# Patient Record
Sex: MALE | Race: WHITE | Employment: OTHER | URBAN - METROPOLITAN AREA
[De-identification: names, ages, dates, MRNs, and addresses within clinical notes are randomized per-mention and may not be internally consistent; named-entity substitution may affect disease eponyms.]

---

## 2019-03-19 ENCOUNTER — HOSPITAL ENCOUNTER (INPATIENT)
Facility: CLINIC | Age: 34
LOS: 5 days | Discharge: HOME | DRG: 642 | End: 2019-03-25
Attending: FAMILY MEDICINE | Admitting: INTERNAL MEDICINE

## 2019-03-19 ENCOUNTER — APPOINTMENT (OUTPATIENT)
Dept: CT IMAGING | Facility: CLINIC | Age: 34
DRG: 642 | End: 2019-03-19
Attending: FAMILY MEDICINE

## 2019-03-19 DIAGNOSIS — E78.1 HYPERTRIGLYCERIDEMIA: ICD-10-CM

## 2019-03-19 DIAGNOSIS — K85.10 ACUTE BILIARY PANCREATITIS, UNSPECIFIED COMPLICATION STATUS: Primary | ICD-10-CM

## 2019-03-19 DIAGNOSIS — K85.90 ACUTE PANCREATITIS, UNSPECIFIED COMPLICATION STATUS, UNSPECIFIED PANCREATITIS TYPE: ICD-10-CM

## 2019-03-19 LAB
ALBUMIN SERPL-MCNC: 4 G/DL (ref 3.4–5)
ALBUMIN UR-MCNC: 30 MG/DL
ALP SERPL-CCNC: 76 U/L (ref 40–150)
ALT SERPL W P-5'-P-CCNC: 95 U/L (ref 0–70)
ANION GAP SERPL CALCULATED.3IONS-SCNC: 7 MMOL/L (ref 3–14)
APPEARANCE UR: CLEAR
AST SERPL W P-5'-P-CCNC: 73 U/L (ref 0–45)
BASOPHILS # BLD AUTO: 0 10E9/L (ref 0–0.2)
BASOPHILS NFR BLD AUTO: 0 %
BILIRUB SERPL-MCNC: 0.9 MG/DL (ref 0.2–1.3)
BILIRUB UR QL STRIP: NEGATIVE
BUN SERPL-MCNC: 14 MG/DL (ref 7–30)
CALCIUM SERPL-MCNC: 8.5 MG/DL (ref 8.5–10.1)
CHLORIDE SERPL-SCNC: 106 MMOL/L (ref 94–109)
CO2 SERPL-SCNC: 27 MMOL/L (ref 20–32)
COLOR UR AUTO: YELLOW
CREAT SERPL-MCNC: 0.78 MG/DL (ref 0.66–1.25)
DIFFERENTIAL METHOD BLD: ABNORMAL
EOSINOPHIL # BLD AUTO: 0.4 10E9/L (ref 0–0.7)
EOSINOPHIL NFR BLD AUTO: 3 %
ERYTHROCYTE [DISTWIDTH] IN BLOOD BY AUTOMATED COUNT: 12.8 % (ref 10–15)
GFR SERPL CREATININE-BSD FRML MDRD: >90 ML/MIN/{1.73_M2}
GLUCOSE SERPL-MCNC: 119 MG/DL (ref 70–99)
GLUCOSE UR STRIP-MCNC: NEGATIVE MG/DL
HCT VFR BLD AUTO: 44.5 % (ref 40–53)
HGB BLD-MCNC: ABNORMAL G/DL (ref 13.3–17.7)
HGB UR QL STRIP: NEGATIVE
KETONES UR STRIP-MCNC: NEGATIVE MG/DL
LEUKOCYTE ESTERASE UR QL STRIP: NEGATIVE
LIPASE SERPL-CCNC: 526 U/L (ref 73–393)
LYMPHOCYTES # BLD AUTO: 2.9 10E9/L (ref 0.8–5.3)
LYMPHOCYTES NFR BLD AUTO: 22 %
MCH RBC QN AUTO: ABNORMAL PG (ref 26.5–33)
MCHC RBC AUTO-ENTMCNC: ABNORMAL G/DL (ref 31.5–36.5)
MCV RBC AUTO: 85 FL (ref 78–100)
MONOCYTES # BLD AUTO: 1.3 10E9/L (ref 0–1.3)
MONOCYTES NFR BLD AUTO: 10 %
NEUTROPHILS # BLD AUTO: 8.6 10E9/L (ref 1.6–8.3)
NEUTROPHILS NFR BLD AUTO: 65 %
NITRATE UR QL: NEGATIVE
PH UR STRIP: 6 PH (ref 5–7)
PLATELET # BLD AUTO: 353 10E9/L (ref 150–450)
POTASSIUM SERPL-SCNC: 4.1 MMOL/L (ref 3.4–5.3)
PROT SERPL-MCNC: 7.7 G/DL (ref 6.8–8.8)
RBC # BLD AUTO: 5.25 10E12/L (ref 4.4–5.9)
RBC #/AREA URNS AUTO: <1 /HPF (ref 0–2)
SODIUM SERPL-SCNC: 140 MMOL/L (ref 133–144)
SOURCE: ABNORMAL
SP GR UR STRIP: 1.02 (ref 1–1.03)
UROBILINOGEN UR STRIP-MCNC: 0 MG/DL (ref 0–2)
WBC # BLD AUTO: 13.3 10E9/L (ref 4–11)
WBC #/AREA URNS AUTO: <1 /HPF (ref 0–5)

## 2019-03-19 PROCEDURE — 99285 EMERGENCY DEPT VISIT HI MDM: CPT | Mod: 25 | Performed by: FAMILY MEDICINE

## 2019-03-19 PROCEDURE — 80053 COMPREHEN METABOLIC PANEL: CPT | Performed by: FAMILY MEDICINE

## 2019-03-19 PROCEDURE — 99285 EMERGENCY DEPT VISIT HI MDM: CPT | Mod: 25

## 2019-03-19 PROCEDURE — 25800030 ZZH RX IP 258 OP 636: Performed by: FAMILY MEDICINE

## 2019-03-19 PROCEDURE — 96375 TX/PRO/DX INJ NEW DRUG ADDON: CPT

## 2019-03-19 PROCEDURE — 85025 COMPLETE CBC W/AUTO DIFF WBC: CPT | Performed by: FAMILY MEDICINE

## 2019-03-19 PROCEDURE — 81001 URINALYSIS AUTO W/SCOPE: CPT | Performed by: EMERGENCY MEDICINE

## 2019-03-19 PROCEDURE — 96374 THER/PROPH/DIAG INJ IV PUSH: CPT

## 2019-03-19 PROCEDURE — 74176 CT ABD & PELVIS W/O CONTRAST: CPT

## 2019-03-19 PROCEDURE — 83690 ASSAY OF LIPASE: CPT | Performed by: FAMILY MEDICINE

## 2019-03-19 PROCEDURE — 25000128 H RX IP 250 OP 636: Performed by: FAMILY MEDICINE

## 2019-03-19 RX ORDER — ONDANSETRON 2 MG/ML
4 INJECTION INTRAMUSCULAR; INTRAVENOUS ONCE
Status: COMPLETED | OUTPATIENT
Start: 2019-03-19 | End: 2019-03-19

## 2019-03-19 RX ORDER — HYDROMORPHONE HYDROCHLORIDE 1 MG/ML
0.5 INJECTION, SOLUTION INTRAMUSCULAR; INTRAVENOUS; SUBCUTANEOUS ONCE
Status: COMPLETED | OUTPATIENT
Start: 2019-03-19 | End: 2019-03-19

## 2019-03-19 RX ORDER — KETOROLAC TROMETHAMINE 30 MG/ML
30 INJECTION, SOLUTION INTRAMUSCULAR; INTRAVENOUS ONCE
Status: COMPLETED | OUTPATIENT
Start: 2019-03-19 | End: 2019-03-19

## 2019-03-19 RX ADMIN — HYDROMORPHONE HYDROCHLORIDE 0.5 MG: 1 INJECTION, SOLUTION INTRAMUSCULAR; INTRAVENOUS; SUBCUTANEOUS at 23:48

## 2019-03-19 RX ADMIN — SODIUM CHLORIDE, POTASSIUM CHLORIDE, SODIUM LACTATE AND CALCIUM CHLORIDE 1000 ML: 600; 310; 30; 20 INJECTION, SOLUTION INTRAVENOUS at 22:35

## 2019-03-19 RX ADMIN — KETOROLAC TROMETHAMINE 30 MG: 30 INJECTION, SOLUTION INTRAMUSCULAR at 22:38

## 2019-03-19 RX ADMIN — ONDANSETRON 4 MG: 2 INJECTION INTRAMUSCULAR; INTRAVENOUS at 22:36

## 2019-03-19 ASSESSMENT — MIFFLIN-ST. JEOR: SCORE: 2074.38

## 2019-03-20 PROBLEM — E78.1 HYPERTRIGLYCERIDEMIA: Status: ACTIVE | Noted: 2019-03-20

## 2019-03-20 PROBLEM — K21.9 CHRONIC GERD: Status: ACTIVE | Noted: 2019-03-20

## 2019-03-20 PROBLEM — K85.90 ACUTE PANCREATITIS: Status: ACTIVE | Noted: 2019-03-20

## 2019-03-20 LAB
ALBUMIN SERPL-MCNC: 3.4 G/DL (ref 3.4–5)
ALBUMIN UR-MCNC: NEGATIVE MG/DL
ALP SERPL-CCNC: 65 U/L (ref 40–150)
ALT SERPL W P-5'-P-CCNC: 79 U/L (ref 0–70)
ANION GAP SERPL CALCULATED.3IONS-SCNC: 11 MMOL/L (ref 3–14)
APPEARANCE UR: CLEAR
AST SERPL W P-5'-P-CCNC: 42 U/L (ref 0–45)
BILIRUB SERPL-MCNC: 1 MG/DL (ref 0.2–1.3)
BILIRUB UR QL STRIP: NEGATIVE
BUN SERPL-MCNC: 14 MG/DL (ref 7–30)
CALCIUM SERPL-MCNC: 8.1 MG/DL (ref 8.5–10.1)
CHLORIDE SERPL-SCNC: 104 MMOL/L (ref 94–109)
CHOLEST SERPL-MCNC: 274 MG/DL
CO2 SERPL-SCNC: 25 MMOL/L (ref 20–32)
COLOR UR AUTO: YELLOW
CREAT SERPL-MCNC: 0.8 MG/DL (ref 0.66–1.25)
ERYTHROCYTE [DISTWIDTH] IN BLOOD BY AUTOMATED COUNT: 13 % (ref 10–15)
ETHANOL SERPL-MCNC: <0.01 G/DL
GFR SERPL CREATININE-BSD FRML MDRD: >90 ML/MIN/{1.73_M2}
GLUCOSE BLDC GLUCOMTR-MCNC: 103 MG/DL (ref 70–99)
GLUCOSE BLDC GLUCOMTR-MCNC: 107 MG/DL (ref 70–99)
GLUCOSE BLDC GLUCOMTR-MCNC: 112 MG/DL (ref 70–99)
GLUCOSE BLDC GLUCOMTR-MCNC: 118 MG/DL (ref 70–99)
GLUCOSE BLDC GLUCOMTR-MCNC: 120 MG/DL (ref 70–99)
GLUCOSE BLDC GLUCOMTR-MCNC: 122 MG/DL (ref 70–99)
GLUCOSE BLDC GLUCOMTR-MCNC: 123 MG/DL (ref 70–99)
GLUCOSE BLDC GLUCOMTR-MCNC: 128 MG/DL (ref 70–99)
GLUCOSE BLDC GLUCOMTR-MCNC: 128 MG/DL (ref 70–99)
GLUCOSE BLDC GLUCOMTR-MCNC: 131 MG/DL (ref 70–99)
GLUCOSE BLDC GLUCOMTR-MCNC: 139 MG/DL (ref 70–99)
GLUCOSE SERPL-MCNC: 141 MG/DL (ref 70–99)
GLUCOSE UR STRIP-MCNC: NEGATIVE MG/DL
HBA1C MFR BLD: 5.7 % (ref 0–5.6)
HCT VFR BLD AUTO: 40.4 % (ref 40–53)
HDLC SERPL-MCNC: 17 MG/DL
HGB BLD-MCNC: ABNORMAL G/DL (ref 13.3–17.7)
HGB UR QL STRIP: NEGATIVE
KETONES UR STRIP-MCNC: NEGATIVE MG/DL
LACTATE BLD-SCNC: 1.2 MMOL/L (ref 0.7–2)
LDLC SERPL CALC-MCNC: ABNORMAL MG/DL
LEUKOCYTE ESTERASE UR QL STRIP: NEGATIVE
LIPASE SERPL-CCNC: 345 U/L (ref 73–393)
MCH RBC QN AUTO: ABNORMAL PG (ref 26.5–33)
MCHC RBC AUTO-ENTMCNC: ABNORMAL G/DL (ref 31.5–36.5)
MCV RBC AUTO: 86 FL (ref 78–100)
MRSA DNA SPEC QL NAA+PROBE: NEGATIVE
NITRATE UR QL: NEGATIVE
NONHDLC SERPL-MCNC: 257 MG/DL
PH UR STRIP: 7 PH (ref 5–7)
PLATELET # BLD AUTO: 261 10E9/L (ref 150–450)
POTASSIUM SERPL-SCNC: 3.9 MMOL/L (ref 3.4–5.3)
PROT SERPL-MCNC: 6.5 G/DL (ref 6.8–8.8)
RBC # BLD AUTO: 4.72 10E12/L (ref 4.4–5.9)
RBC #/AREA URNS AUTO: <1 /HPF (ref 0–2)
SODIUM SERPL-SCNC: 140 MMOL/L (ref 133–144)
SOURCE: NORMAL
SP GR UR STRIP: 1.01 (ref 1–1.03)
SPECIMEN SOURCE: NORMAL
TRIGL SERPL-MCNC: 2305 MG/DL
TRIGL SERPL-MCNC: 4463 MG/DL
TRIGL SERPL-MCNC: >4000 MG/DL
TSH SERPL DL<=0.005 MIU/L-ACNC: 2.08 MU/L (ref 0.4–4)
UROBILINOGEN UR STRIP-MCNC: 0 MG/DL (ref 0–2)
WBC # BLD AUTO: 11.9 10E9/L (ref 4–11)
WBC #/AREA URNS AUTO: <1 /HPF (ref 0–5)

## 2019-03-20 PROCEDURE — 85027 COMPLETE CBC AUTOMATED: CPT | Performed by: PHYSICIAN ASSISTANT

## 2019-03-20 PROCEDURE — 25800030 ZZH RX IP 258 OP 636: Performed by: PHYSICIAN ASSISTANT

## 2019-03-20 PROCEDURE — 25000132 ZZH RX MED GY IP 250 OP 250 PS 637: Performed by: PHYSICIAN ASSISTANT

## 2019-03-20 PROCEDURE — 25000128 H RX IP 250 OP 636: Performed by: FAMILY MEDICINE

## 2019-03-20 PROCEDURE — 25000128 H RX IP 250 OP 636: Performed by: PHYSICIAN ASSISTANT

## 2019-03-20 PROCEDURE — C9113 INJ PANTOPRAZOLE SODIUM, VIA: HCPCS | Performed by: FAMILY MEDICINE

## 2019-03-20 PROCEDURE — 80053 COMPREHEN METABOLIC PANEL: CPT | Performed by: PHYSICIAN ASSISTANT

## 2019-03-20 PROCEDURE — 87641 MR-STAPH DNA AMP PROBE: CPT | Performed by: PHYSICIAN ASSISTANT

## 2019-03-20 PROCEDURE — 99223 1ST HOSP IP/OBS HIGH 75: CPT | Mod: AI | Performed by: INTERNAL MEDICINE

## 2019-03-20 PROCEDURE — 25000131 ZZH RX MED GY IP 250 OP 636 PS 637: Performed by: PHYSICIAN ASSISTANT

## 2019-03-20 PROCEDURE — 80320 DRUG SCREEN QUANTALCOHOLS: CPT | Performed by: STUDENT IN AN ORGANIZED HEALTH CARE EDUCATION/TRAINING PROGRAM

## 2019-03-20 PROCEDURE — 80061 LIPID PANEL: CPT | Performed by: INTERNAL MEDICINE

## 2019-03-20 PROCEDURE — 36415 COLL VENOUS BLD VENIPUNCTURE: CPT | Performed by: PHYSICIAN ASSISTANT

## 2019-03-20 PROCEDURE — 83036 HEMOGLOBIN GLYCOSYLATED A1C: CPT | Performed by: INTERNAL MEDICINE

## 2019-03-20 PROCEDURE — 83690 ASSAY OF LIPASE: CPT | Performed by: PHYSICIAN ASSISTANT

## 2019-03-20 PROCEDURE — 83605 ASSAY OF LACTIC ACID: CPT | Performed by: INTERNAL MEDICINE

## 2019-03-20 PROCEDURE — C9113 INJ PANTOPRAZOLE SODIUM, VIA: HCPCS | Performed by: PHYSICIAN ASSISTANT

## 2019-03-20 PROCEDURE — 20000003 ZZH R&B ICU

## 2019-03-20 PROCEDURE — 99207 ZZC APP CREDIT; MD BILLING SHARED VISIT: CPT | Performed by: PHYSICIAN ASSISTANT

## 2019-03-20 PROCEDURE — 84478 ASSAY OF TRIGLYCERIDES: CPT | Performed by: PHYSICIAN ASSISTANT

## 2019-03-20 PROCEDURE — 25000128 H RX IP 250 OP 636: Performed by: INTERNAL MEDICINE

## 2019-03-20 PROCEDURE — 00000146 ZZHCL STATISTIC GLUCOSE BY METER IP

## 2019-03-20 PROCEDURE — 36415 COLL VENOUS BLD VENIPUNCTURE: CPT | Performed by: INTERNAL MEDICINE

## 2019-03-20 PROCEDURE — 87640 STAPH A DNA AMP PROBE: CPT | Performed by: PHYSICIAN ASSISTANT

## 2019-03-20 PROCEDURE — 81001 URINALYSIS AUTO W/SCOPE: CPT | Performed by: PHYSICIAN ASSISTANT

## 2019-03-20 PROCEDURE — 96375 TX/PRO/DX INJ NEW DRUG ADDON: CPT

## 2019-03-20 PROCEDURE — 84443 ASSAY THYROID STIM HORMONE: CPT | Performed by: PHYSICIAN ASSISTANT

## 2019-03-20 RX ORDER — NALOXONE HYDROCHLORIDE 0.4 MG/ML
.1-.4 INJECTION, SOLUTION INTRAMUSCULAR; INTRAVENOUS; SUBCUTANEOUS
Status: DISCONTINUED | OUTPATIENT
Start: 2019-03-20 | End: 2019-03-20

## 2019-03-20 RX ORDER — PROCHLORPERAZINE 25 MG
25 SUPPOSITORY, RECTAL RECTAL EVERY 12 HOURS PRN
Status: DISCONTINUED | OUTPATIENT
Start: 2019-03-20 | End: 2019-03-25 | Stop reason: HOSPADM

## 2019-03-20 RX ORDER — IBUPROFEN 200 MG
400-600 TABLET ORAL DAILY PRN
Status: ON HOLD | COMMUNITY
End: 2019-03-25

## 2019-03-20 RX ORDER — SODIUM CHLORIDE, SODIUM LACTATE, POTASSIUM CHLORIDE, CALCIUM CHLORIDE 600; 310; 30; 20 MG/100ML; MG/100ML; MG/100ML; MG/100ML
INJECTION, SOLUTION INTRAVENOUS CONTINUOUS
Status: DISCONTINUED | OUTPATIENT
Start: 2019-03-20 | End: 2019-03-20

## 2019-03-20 RX ORDER — ONDANSETRON 4 MG/1
4 TABLET, ORALLY DISINTEGRATING ORAL EVERY 6 HOURS PRN
Status: DISCONTINUED | OUTPATIENT
Start: 2019-03-20 | End: 2019-03-25 | Stop reason: HOSPADM

## 2019-03-20 RX ORDER — DEXTROSE MONOHYDRATE 25 G/50ML
25-50 INJECTION, SOLUTION INTRAVENOUS
Status: DISCONTINUED | OUTPATIENT
Start: 2019-03-20 | End: 2019-03-24

## 2019-03-20 RX ORDER — ONDANSETRON 2 MG/ML
4 INJECTION INTRAMUSCULAR; INTRAVENOUS EVERY 6 HOURS PRN
Status: DISCONTINUED | OUTPATIENT
Start: 2019-03-20 | End: 2019-03-25 | Stop reason: HOSPADM

## 2019-03-20 RX ORDER — NALOXONE HYDROCHLORIDE 0.4 MG/ML
.1-.4 INJECTION, SOLUTION INTRAMUSCULAR; INTRAVENOUS; SUBCUTANEOUS
Status: DISCONTINUED | OUTPATIENT
Start: 2019-03-20 | End: 2019-03-25 | Stop reason: HOSPADM

## 2019-03-20 RX ORDER — OXYCODONE HYDROCHLORIDE 5 MG/1
5-10 TABLET ORAL
Status: DISCONTINUED | OUTPATIENT
Start: 2019-03-20 | End: 2019-03-23

## 2019-03-20 RX ORDER — HYDROMORPHONE HYDROCHLORIDE 1 MG/ML
.5-1 INJECTION, SOLUTION INTRAMUSCULAR; INTRAVENOUS; SUBCUTANEOUS
Status: DISCONTINUED | OUTPATIENT
Start: 2019-03-20 | End: 2019-03-25 | Stop reason: HOSPADM

## 2019-03-20 RX ORDER — NICOTINE POLACRILEX 4 MG
15-30 LOZENGE BUCCAL
Status: DISCONTINUED | OUTPATIENT
Start: 2019-03-20 | End: 2019-03-24

## 2019-03-20 RX ORDER — SODIUM CHLORIDE 9 MG/ML
INJECTION, SOLUTION INTRAVENOUS CONTINUOUS
Status: DISCONTINUED | OUTPATIENT
Start: 2019-03-20 | End: 2019-03-20

## 2019-03-20 RX ORDER — ACETAMINOPHEN 325 MG/1
650 TABLET ORAL EVERY 4 HOURS PRN
Status: DISCONTINUED | OUTPATIENT
Start: 2019-03-20 | End: 2019-03-25 | Stop reason: HOSPADM

## 2019-03-20 RX ORDER — KETOROLAC TROMETHAMINE 15 MG/ML
15 INJECTION, SOLUTION INTRAMUSCULAR; INTRAVENOUS EVERY 6 HOURS PRN
Status: DISCONTINUED | OUTPATIENT
Start: 2019-03-20 | End: 2019-03-20

## 2019-03-20 RX ORDER — DEXTROSE, SODIUM CHLORIDE, SODIUM LACTATE, POTASSIUM CHLORIDE, AND CALCIUM CHLORIDE 5; .6; .31; .03; .02 G/100ML; G/100ML; G/100ML; G/100ML; G/100ML
INJECTION, SOLUTION INTRAVENOUS CONTINUOUS
Status: DISCONTINUED | OUTPATIENT
Start: 2019-03-20 | End: 2019-03-24

## 2019-03-20 RX ORDER — HYDROMORPHONE HYDROCHLORIDE 1 MG/ML
.3-.5 INJECTION, SOLUTION INTRAMUSCULAR; INTRAVENOUS; SUBCUTANEOUS
Status: DISCONTINUED | OUTPATIENT
Start: 2019-03-20 | End: 2019-03-20

## 2019-03-20 RX ORDER — PROCHLORPERAZINE MALEATE 10 MG
10 TABLET ORAL EVERY 6 HOURS PRN
Status: DISCONTINUED | OUTPATIENT
Start: 2019-03-20 | End: 2019-03-25 | Stop reason: HOSPADM

## 2019-03-20 RX ADMIN — HYDROMORPHONE HYDROCHLORIDE 0.5 MG: 1 INJECTION, SOLUTION INTRAMUSCULAR; INTRAVENOUS; SUBCUTANEOUS at 18:50

## 2019-03-20 RX ADMIN — Medication 0.5 MG: at 01:25

## 2019-03-20 RX ADMIN — HYDROMORPHONE HYDROCHLORIDE 0.5 MG: 1 INJECTION, SOLUTION INTRAMUSCULAR; INTRAVENOUS; SUBCUTANEOUS at 20:42

## 2019-03-20 RX ADMIN — HYDROMORPHONE HYDROCHLORIDE 0.5 MG: 1 INJECTION, SOLUTION INTRAMUSCULAR; INTRAVENOUS; SUBCUTANEOUS at 10:39

## 2019-03-20 RX ADMIN — Medication 0.5 MG: at 05:36

## 2019-03-20 RX ADMIN — SODIUM CHLORIDE, SODIUM LACTATE, POTASSIUM CHLORIDE, CALCIUM CHLORIDE AND DEXTROSE MONOHYDRATE: 5; 600; 310; 30; 20 INJECTION, SOLUTION INTRAVENOUS at 21:37

## 2019-03-20 RX ADMIN — SODIUM CHLORIDE, SODIUM LACTATE, POTASSIUM CHLORIDE, CALCIUM CHLORIDE AND DEXTROSE MONOHYDRATE: 5; 600; 310; 30; 20 INJECTION, SOLUTION INTRAVENOUS at 17:01

## 2019-03-20 RX ADMIN — ACETAMINOPHEN 650 MG: 325 TABLET, FILM COATED ORAL at 15:50

## 2019-03-20 RX ADMIN — Medication 0.5 MG: at 07:07

## 2019-03-20 RX ADMIN — PANTOPRAZOLE SODIUM 40 MG: 40 INJECTION, POWDER, FOR SOLUTION INTRAVENOUS at 00:09

## 2019-03-20 RX ADMIN — ACETAMINOPHEN 650 MG: 325 TABLET, FILM COATED ORAL at 21:44

## 2019-03-20 RX ADMIN — SODIUM CHLORIDE, SODIUM LACTATE, POTASSIUM CHLORIDE, CALCIUM CHLORIDE AND DEXTROSE MONOHYDRATE: 5; 600; 310; 30; 20 INJECTION, SOLUTION INTRAVENOUS at 12:43

## 2019-03-20 RX ADMIN — Medication 0.5 MG: at 03:15

## 2019-03-20 RX ADMIN — SODIUM CHLORIDE 2 UNITS/HR: 9 INJECTION, SOLUTION INTRAVENOUS at 12:44

## 2019-03-20 RX ADMIN — HYDROMORPHONE HYDROCHLORIDE 0.5 MG: 1 INJECTION, SOLUTION INTRAMUSCULAR; INTRAVENOUS; SUBCUTANEOUS at 15:51

## 2019-03-20 RX ADMIN — HYDROMORPHONE HYDROCHLORIDE 0.5 MG: 1 INJECTION, SOLUTION INTRAMUSCULAR; INTRAVENOUS; SUBCUTANEOUS at 18:01

## 2019-03-20 RX ADMIN — ONDANSETRON 4 MG: 2 INJECTION INTRAMUSCULAR; INTRAVENOUS at 08:48

## 2019-03-20 RX ADMIN — SODIUM CHLORIDE, POTASSIUM CHLORIDE, SODIUM LACTATE AND CALCIUM CHLORIDE: 600; 310; 30; 20 INJECTION, SOLUTION INTRAVENOUS at 07:02

## 2019-03-20 RX ADMIN — KETOROLAC TROMETHAMINE 15 MG: 15 INJECTION, SOLUTION INTRAMUSCULAR; INTRAVENOUS at 13:13

## 2019-03-20 RX ADMIN — HYDROMORPHONE HYDROCHLORIDE 0.5 MG: 1 INJECTION, SOLUTION INTRAMUSCULAR; INTRAVENOUS; SUBCUTANEOUS at 21:42

## 2019-03-20 RX ADMIN — PANTOPRAZOLE SODIUM 40 MG: 40 INJECTION, POWDER, FOR SOLUTION INTRAVENOUS at 10:39

## 2019-03-20 RX ADMIN — Medication 1 MG: at 21:44

## 2019-03-20 RX ADMIN — ONDANSETRON 4 MG: 2 INJECTION INTRAMUSCULAR; INTRAVENOUS at 17:09

## 2019-03-20 RX ADMIN — SODIUM CHLORIDE, POTASSIUM CHLORIDE, SODIUM LACTATE AND CALCIUM CHLORIDE: 600; 310; 30; 20 INJECTION, SOLUTION INTRAVENOUS at 03:12

## 2019-03-20 RX ADMIN — Medication 1 MG: at 01:20

## 2019-03-20 RX ADMIN — PROCHLORPERAZINE EDISYLATE 10 MG: 5 INJECTION INTRAMUSCULAR; INTRAVENOUS at 10:46

## 2019-03-20 RX ADMIN — HYDROMORPHONE HYDROCHLORIDE 1 MG: 1 INJECTION, SOLUTION INTRAMUSCULAR; INTRAVENOUS; SUBCUTANEOUS at 08:37

## 2019-03-20 RX ADMIN — ONDANSETRON 4 MG: 2 INJECTION INTRAMUSCULAR; INTRAVENOUS at 21:46

## 2019-03-20 RX ADMIN — PANTOPRAZOLE SODIUM 40 MG: 40 INJECTION, POWDER, FOR SOLUTION INTRAVENOUS at 21:50

## 2019-03-20 SDOH — HEALTH STABILITY: MENTAL HEALTH: HOW OFTEN DO YOU HAVE A DRINK CONTAINING ALCOHOL?: 2-4 TIMES A MONTH

## 2019-03-20 ASSESSMENT — ACTIVITIES OF DAILY LIVING (ADL)
BATHING: 0-->INDEPENDENT
FALL_HISTORY_WITHIN_LAST_SIX_MONTHS: NO
TRANSFERRING: 0-->INDEPENDENT
ADLS_ACUITY_SCORE: 10
DRESS: 0-->INDEPENDENT
RETIRED_EATING: 0-->INDEPENDENT
ADLS_ACUITY_SCORE: 10
COGNITION: 0 - NO COGNITION ISSUES REPORTED
RETIRED_COMMUNICATION: 0-->UNDERSTANDS/COMMUNICATES WITHOUT DIFFICULTY
TOILETING: 0-->INDEPENDENT
AMBULATION: 0-->INDEPENDENT
ADLS_ACUITY_SCORE: 10
SWALLOWING: 0-->SWALLOWS FOODS/LIQUIDS WITHOUT DIFFICULTY

## 2019-03-20 ASSESSMENT — MIFFLIN-ST. JEOR: SCORE: 2103.25

## 2019-03-20 ASSESSMENT — PAIN DESCRIPTION - DESCRIPTORS: DESCRIPTORS: ACHING;CRAMPING;DISCOMFORT;HEADACHE

## 2019-03-20 NOTE — PLAN OF CARE
"[place] [facility] [place] ADMISSION NOTE    Patient admitted to room 2309 at approximately 0045 via cart from emergency room. Patient was accompanied by significant other.     Verbal SBAR report received from Ludwig prior to patient arrival.     Patient ambulated to bed with stand-by assist. Patient alert and oriented X 3. Pain is controlled with current analgesics.  Medication(s) being used: narcotic analgesics including dilaudid. 0-10 Pain Scale: 7. Admission vital signs: Blood pressure 151/88, pulse 95, temperature 99.2  F (37.3  C), temperature source Oral, resp. rate 18, height 1.753 m (5' 9\"), weight 113.4 kg (250 lb 0 oz), SpO2 96 %. Patient was oriented to plan of care, call light, bed controls, tv, telephone, bathroom and visiting hours.     Risk Assessment    The following safety risks were identified during admission: none. Yellow risk band applied: NO.     Skin Initial Assessment    This writer admitted this patient and completed a full skin assessment and Ric score in the Adult PCS flowsheet. Appropriate interventions initiated as needed. Skin intact except for facial rash.    Secondary skin check completed by Luz Maria REID RN.         Lydumila Freed      "

## 2019-03-20 NOTE — PLAN OF CARE
Patient transferred to ICU for insulin drip. Currently at 2 units/hr. Patient placed on oxygen for a time D/T hypoventilation after dose of dilaudid. Patient trial of toradol  with good relief of pain 1 hr post. Patient appears to have some sleep apnea.

## 2019-03-20 NOTE — H&P
[facility]    History and Physical - Hospitalist Service       Date of Admission:  3/19/2019    Assessment & Plan   Booker Quintana is a 33 year old male admitted on 3/19/2019. He presents with abdominal pain.    Abd/pelvis CT - no contrast - Stone Protocol    Impression    IMPRESSION:  1. Inflammation surrounding the head of the pancreas likely related to  pancreatitis. Its possible duodenitis or duodenal ulcer could  contribute to this appearance but most likely related to pancreatitis.  2. Prominent fatty infiltration of the liver.  3. Mildly prominent spleen.    PORFIRIO SAMUEL MD       Acute pancreatitis due to hypertriglyceridemia  Acute onset low back pain the morning of admission, throughout day moved to mid-abdomen and epigastrium. Pain is constant and sharp. Denies fever, chills, nausea, vomiting, diarrhea, constipation. He did develop some nausea after receiving Dilaudid this morning. Occasional ETOH use (3-5 drinks/month). Abdomen is markedly tender with guarding in RLQ, mid-abdomen, and epigastrium. CT shows likely pancreatitis with possible duodenitis/duodenal ulcer, fatty liver, prominent spleen. Triglycerides 4463, WBC 13.3 -> 11.9, Lipase 526 -> 345, ALT 95 -> 79, AST 73 -> 42, EtOH 0.   Received 1L LR in the ED and another 2L @ 250/hr.  - NPO  - Dilaudid 0.5-1mg q2h prn  - Transfer to ICU  - Insulin drip @ 2 unit(s)/hr  - D5LR @ 250 / hr  - Hourly glucose checks  - Triglycerides q12h    Hypertriglyceridemia  Unclear etiology.  He does have some diabetes and elevated cholesterol his family but no known specific diagnosis of hypertriglyceridemia.  No xanthomas noted on exam.  Denies excessive alcohol use.  Not on any medications known to cause hypertriglyceridemia.  A1c 5.7%.  - TSH  - UA  - Lifestyle coaching and fenofibrate on discharge    Chronic GERD  Daily heartburn for 10-15 years. Takes daily omeprazole and ranitidine along with frequent Tums. Worse at night,  frequently wakes up vomiting. CT did show some findings concerning for duodenitis or possible duodenal ulcer.  If epigastric pain is not improving as expected with resolution of the pancreatitis, may need to consider an EGD during this admission, otherwise should have an outpatient EGD.  - IV pantoprazole 40 mg BID  - Outpatient EGD       Diet: NPO for Medical/Clinical Reasons Except for: Meds    DVT Prophylaxis: Low Risk/Ambulatory with no VTE prophylaxis indicated  Bishop Catheter: not present  Code Status: Full    Disposition Plan   Expected discharge: 4 - 7 days, recommended to prior living arrangement once pancreatitis and hypertriglyceridemia resolved.  Entered: Elmo Carrizales PA-C 03/20/2019, 11:00 AM     The patient's care was discussed with the Attending Physician, Dr. Nixon, Patient and Patient's Family.    Elmo Carrizales PA-C  [facility]    ______________________________________________________________________    Chief Complaint   Abdominal pain    History is obtained from the patient    History of Present Illness   Booker Quintana is a 33 year old male with no significant medical history other than GERD who presents with acute onset of abdominal pain.  Woke up with mild low back pain yesterday morning, initially thought it was pain from moving couch as the day before.  After lunch the pain migrated around to his mid abdomen and from there extended to the right lower quadrant and epigastrium.  Pain continued to worsen throughout the day.  Denies fever, chills, nausea, vomiting, black/bloody stools.  Denies any similar previous abdominal pain.  Denies history of pancreatitis.  Acknowledges occasional alcohol use but states it is no more than 3-5 drinks per month.  This is confirmed by his family.  He did have two double rum and Cokes the day before admission.    He also acknowledges a long-standing history of severe heartburn for which he takes daily OTC omeprazole,  ranitidine, and Tums.  He has never been worked up for this.    Denies chest pain, palpitations, dyspnea, cough, fevers, chills.    Review of Systems    The 10 point Review of Systems is negative other than noted in the HPI or here.     Past Medical History    I have reviewed this patient's medical history and updated it with pertinent information if needed.   No past medical history on file.    Past Surgical History   I have reviewed this patient's surgical history and updated it with pertinent information if needed.  No past surgical history on file.    Social History   I have reviewed this patient's social history and updated it with pertinent information if needed.  Social History     Tobacco Use     Smoking status: Current Some Day Smoker     Packs/day: 0.10     Types: Cigarettes   Substance Use Topics     Alcohol use: Yes     Frequency: 2-4 times a month     Drug use: Not on file       Family History   I have reviewed this patient's family history and updated it with pertinent information if needed.   Family History   Problem Relation Age of Onset     Diabetes Maternal Great-Grandmother      Diabetes Maternal Uncle      Diabetes Paternal Grandfather      Hyperlipidemia Paternal Grandfather        Prior to Admission Medications   Prior to Admission Medications   Prescriptions Last Dose Informant Patient Reported? Taking?   ibuprofen (ADVIL/MOTRIN) 200 MG tablet 3/19/2019 at 08  Yes Yes   Sig: Take 400-600 mg by mouth daily as needed for mild pain   omeprazole (PRILOSEC) 20 MG DR capsule 3/19/2019 at 08 Self Yes Yes   Sig: Take 20 mg by mouth daily   ranitidine (ZANTAC) 75 MG tablet 3/19/2019 at 08  Yes Yes   Sig: Take 75 mg by mouth daily      Facility-Administered Medications: None     Allergies   No Known Allergies    Physical Exam   Vital Signs: Temp: 98.7  F (37.1  C) Temp src: Oral BP: 136/76 Pulse: 92 Heart Rate: 94 Resp: 18 SpO2: 93 % O2 Device: None (Room air)    Weight: 250 lbs .03 oz    General:  Appears calm, comfortable, nontoxic. Answers questions quickly and appropriately with clear speech. No apparent distress.  Skin: Pink, warm, dry. No xanthomas.  Eyes: PERRL. Sclera are white.  HENT:  Normocephalic, atraumatic. Oropharynx is moist, without lesions or exudate.  Lymph/Hematologic: No occipital, anterior/posterior cervical, supraclavicular, or axillary lymphadenopathy.  CV: RRR, clear S1/S2 without murmur, rub, or gallop. Radial pulses equal bilaterally. No lower extremity edema.  Respiratory: CTA and equal bilaterally. Normal respiratory effort.  GI: Firm, diffusely tender with focal tenderness and guarding at the epigastrium, mid-abdomen, and RLQ. Normal bowel sounds.  Musculoskeletal: Moving all extremities well. Normal muscle bulk and tone.  Neuro: Memory and cognition appear normal. CN II - XII grossly intact. Symmetrical extremity strength.  Psych: Normal mood and affect.         Data   Data reviewed today: I reviewed all medications, new labs and imaging results over the last 24 hours. I personally reviewed no images or EKG's today.    Recent Labs   Lab 03/20/19  0506 03/19/19  2148   WBC 11.9* 13.3*   HGB Results not available-specimen lipemic Results not available-specimen lipemic   MCV 86 85    353    140   POTASSIUM 3.9 4.1   CHLORIDE 104 106   CO2 25 27   BUN 14 14   CR 0.80 0.78   ANIONGAP 11 7   BRIAN 8.1* 8.5   * 119*   ALBUMIN 3.4 4.0   PROTTOTAL 6.5* 7.7   BILITOTAL 1.0 0.9   ALKPHOS 65 76   ALT 79* 95*   AST 42 73*   LIPASE 345 526*     Recent Results (from the past 24 hour(s))   Abd/pelvis CT - no contrast - Stone Protocol    Narrative    CT ABDOMEN AND PELVIS WITHOUT CONTRAST   3/19/2019 10:46 PM     HISTORY: Abdominal pain, appendicitis suspected. Right lower quadrant  abdominal pain.    TECHNIQUE:   No IV contrast material. Radiation dose for this scan was  reduced using automated exposure control, adjustment of the mA and/or  kV according to patient size, or  iterative reconstruction technique.    COMPARISON: None.    FINDINGS:    Lung bases are unremarkable. Prominent fatty infiltration of the  liver. Spleen is upper-normal to mildly prominent. There is  inflammation in the head of the pancreas consistent with pancreatitis.  This is immediately adjacent to the duodenum and duodenitis/duodenal  ulcer or acid peptic disease could contribute to this appearance.    Kidneys and adrenals are normal. No adenopathy. Appendix is normal and  well seen. No evidence of bowel obstruction, free air, or free fluid.      Impression    IMPRESSION:  1. Inflammation surrounding the head of the pancreas likely related to  pancreatitis. Its possible duodenitis or duodenal ulcer could  contribute to this appearance but most likely related to pancreatitis.  2. Prominent fatty infiltration of the liver.  3. Mildly prominent spleen.    PORFIRIO SAMUEL MD

## 2019-03-20 NOTE — ED PROVIDER NOTES
History     Chief Complaint   Patient presents with     Abdominal Pain     Rt side and radiation to rt flank/ no urinary symptoms     HPI  Booker Quintana is a 33 year old male, past medical history is unremarkable, presents to the emergency department concerns of abdominal pain beginning the a.m. of presentation shortly after awakening.  The patient states that when he got out of bed this morning he had right flank pain that was sharp, like a muscle cramp.  He thought perhaps he had done something lifting or straining himself.  Through the course of the day he developed right upper and right lower quadrant abdominal pain that seems better if he standing upright.  Laying down makes it worse.  Treated with anorexia, denies nausea or vomiting.  He has had a bowel movement about 2 hours ago which was formed but did not relieve any of the discomfort.  Passing gas relieves none of the discomfort.  He notes no fever chills or sweats.  Denies urinary tract symptoms such as frequency urgency or dysuria, no hematuria.  He has never had pain like this before.  No pain medications taken.  Last drink alcohol yesterday evening with dinner had 2 double rum and Cokes.    Allergies:  No Known Allergies    Problem List:    Patient Active Problem List    Diagnosis Date Noted     Acute pancreatitis due to hypertriglyceridemia 03/20/2019     Priority: Medium     Chronic GERD 03/20/2019     Priority: Medium     Hypertriglyceridemia 03/20/2019     Priority: Medium        Past Medical History:    No past medical history on file.    Past Surgical History:    No past surgical history on file.    Family History:    Family History   Problem Relation Age of Onset     Diabetes Maternal Great-Grandmother      Diabetes Maternal Uncle      Diabetes Paternal Grandfather      Hyperlipidemia Paternal Grandfather        Social History:  Marital Status:  Single [1]  Social History     Tobacco Use     Smoking status: Current Some Day Smoker      "Packs/day: 0.10     Types: Cigarettes   Substance Use Topics     Alcohol use: Yes     Frequency: 2-4 times a month     Drug use: Not on file        Medications:      No current outpatient medications on file.      Review of Systems   All other systems reviewed and are negative.      Physical Exam   BP: (!) 165/111  Pulse: 87  Heart Rate: 85  Temp: 98.7  F (37.1  C)  Resp: 20  Height: 175.3 cm (5' 9\")  Weight: 113.4 kg (250 lb 0 oz)  SpO2: 99 %      Physical Exam   Constitutional: He appears well-developed and well-nourished.   HENT:   Head: Normocephalic and atraumatic.   Right Ear: External ear normal.   Left Ear: External ear normal.   Nose: Nose normal.   Mouth/Throat: Oropharynx is clear and moist.   Eyes: Conjunctivae and EOM are normal. Pupils are equal, round, and reactive to light.   Neck: Normal range of motion. Neck supple.   Cardiovascular: Normal rate, regular rhythm, normal heart sounds and intact distal pulses.   Pulmonary/Chest: Effort normal and breath sounds normal.   Abdominal: Bowel sounds are normal. There is tenderness. There is guarding.       Nursing note and vitals reviewed.      ED Course        Procedures               Critical Care time:  none               Results for orders placed or performed during the hospital encounter of 03/19/19 (from the past 24 hour(s))   Comprehensive metabolic panel   Result Value Ref Range    Sodium 140 133 - 144 mmol/L    Potassium 3.9 3.4 - 5.3 mmol/L    Chloride 104 94 - 109 mmol/L    Carbon Dioxide 25 20 - 32 mmol/L    Anion Gap 11 3 - 14 mmol/L    Glucose 141 (H) 70 - 99 mg/dL    Urea Nitrogen 14 7 - 30 mg/dL    Creatinine 0.80 0.66 - 1.25 mg/dL    GFR Estimate >90 >60 mL/min/[1.73_m2]    GFR Estimate If Black >90 >60 mL/min/[1.73_m2]    Calcium 8.1 (L) 8.5 - 10.1 mg/dL    Bilirubin Total 1.0 0.2 - 1.3 mg/dL    Albumin 3.4 3.4 - 5.0 g/dL    Protein Total 6.5 (L) 6.8 - 8.8 g/dL    Alkaline Phosphatase 65 40 - 150 U/L    ALT 79 (H) 0 - 70 U/L    AST 42 0 - " 45 U/L   CBC with platelets   Result Value Ref Range    WBC 11.9 (H) 4.0 - 11.0 10e9/L    RBC Count 4.72 4.4 - 5.9 10e12/L    Hemoglobin Results not available-specimen lipemic 13.3 - 17.7 g/dL    Hematocrit 40.4 40.0 - 53.0 %    MCV 86 78 - 100 fl    MCH Results not available-specimen lipemic 26.5 - 33.0 pg    MCHC Results not available-specimen lipemic 31.5 - 36.5 g/dL    RDW 13.0 10.0 - 15.0 %    Platelet Count 261 150 - 450 10e9/L   Lipase   Result Value Ref Range    Lipase 345 73 - 393 U/L   Lipid panel reflex to direct LDL   Result Value Ref Range    Cholesterol 274 (H) <200 mg/dL    Triglycerides 4,463 (H) <150 mg/dL    HDL Cholesterol 17 (L) >39 mg/dL    LDL Cholesterol Calculated  <100 mg/dL     Cannot estimate LDL when triglyceride exceeds 400 mg/dL    Non HDL Cholesterol 257 (H) <130 mg/dL   Hemoglobin A1c   Result Value Ref Range    Hemoglobin A1C 5.7 (H) 0 - 5.6 %   Methicillin Resist/Sens S. aureus PCR   Result Value Ref Range    Specimen Description Nares     Methicillin Resist/Sens S. aureus PCR Negative NEG^Negative   UA with Microscopic reflex to Culture   Result Value Ref Range    Color Urine Yellow     Appearance Urine Clear     Glucose Urine Negative NEG^Negative mg/dL    Bilirubin Urine Negative NEG^Negative    Ketones Urine Negative NEG^Negative mg/dL    Specific Gravity Urine 1.015 1.003 - 1.035    Blood Urine Negative NEG^Negative    pH Urine 7.0 5.0 - 7.0 pH    Protein Albumin Urine Negative NEG^Negative mg/dL    Urobilinogen mg/dL 0.0 0.0 - 2.0 mg/dL    Nitrite Urine Negative NEG^Negative    Leukocyte Esterase Urine Negative NEG^Negative    Source Clean catch urine     WBC Urine <1 0 - 5 /HPF    RBC Urine <1 0 - 2 /HPF   Glucose by meter   Result Value Ref Range    Glucose 131 (H) 70 - 99 mg/dL   TSH   Result Value Ref Range    TSH 2.08 0.40 - 4.00 mU/L   Glucose by meter   Result Value Ref Range    Glucose 139 (H) 70 - 99 mg/dL   Glucose by meter   Result Value Ref Range    Glucose 122 (H)  70 - 99 mg/dL   Glucose by meter   Result Value Ref Range    Glucose 112 (H) 70 - 99 mg/dL   Glucose by meter   Result Value Ref Range    Glucose 123 (H) 70 - 99 mg/dL   Glucose by meter   Result Value Ref Range    Glucose 128 (H) 70 - 99 mg/dL   Triglycerides   Result Value Ref Range    Triglycerides 2,305 (H) <150 mg/dL   Glucose by meter   Result Value Ref Range    Glucose 120 (H) 70 - 99 mg/dL   Glucose by meter   Result Value Ref Range    Glucose 118 (H) 70 - 99 mg/dL   Glucose by meter   Result Value Ref Range    Glucose 107 (H) 70 - 99 mg/dL   Lactic acid level STAT for sepsis protocol   Result Value Ref Range    Lactate for Sepsis Protocol 1.2 0.7 - 2.0 mmol/L   Glucose by meter   Result Value Ref Range    Glucose 103 (H) 70 - 99 mg/dL   Glucose by meter   Result Value Ref Range    Glucose 128 (H) 70 - 99 mg/dL   Glucose by meter   Result Value Ref Range    Glucose 120 (H) 70 - 99 mg/dL       Medications   ondansetron (ZOFRAN-ODT) ODT tab 4 mg ( Oral See Alternative 3/20/19 2146)     Or   ondansetron (ZOFRAN) injection 4 mg (4 mg Intravenous Given 3/20/19 2146)   melatonin tablet 1 mg (1 mg Oral Given 3/20/19 2144)   acetaminophen (TYLENOL) tablet 650 mg (650 mg Oral Given 3/20/19 2144)   oxyCODONE (ROXICODONE) tablet 5-10 mg (not administered)   prochlorperazine (COMPAZINE) injection 10 mg (10 mg Intravenous Given 3/20/19 1046)     Or   prochlorperazine (COMPAZINE) tablet 10 mg ( Oral See Alternative 3/20/19 1046)     Or   prochlorperazine (COMPAZINE) Suppository 25 mg ( Rectal See Alternative 3/20/19 1046)   pantoprazole (PROTONIX) 40 mg IV push injection (40 mg Intravenous Given 3/20/19 2150)   HYDROmorphone (PF) (DILAUDID) injection 0.5-1 mg (0.5 mg Intravenous Given 3/21/19 0055)   naloxone (NARCAN) injection 0.1-0.4 mg (not administered)   insulin 1 units/1 mL saline (NovoLIN, HumuLIN Regular) infusion ADULT/PEDS (2 Units/hr Intravenous New Bag 3/20/19 7183)   dextrose 5% in lactated ringers infusion (  Intravenous New Bag 3/20/19 2137)   glucose gel 15-30 g (not administered)     Or   dextrose 50 % injection 25-50 mL (not administered)     Or   glucagon injection 1 mg (not administered)   ketorolac (TORADOL) injection 30 mg (30 mg Intravenous Given 3/19/19 2238)   ondansetron (ZOFRAN) injection 4 mg (4 mg Intravenous Given 3/19/19 2236)   lactated ringers BOLUS 1,000 mL (1,000 mLs Intravenous New Bag 3/19/19 2235)   HYDROmorphone (PF) (DILAUDID) injection 0.5 mg (0.5 mg Intravenous Given 3/19/19 2348)   pantoprazole (PROTONIX) 40 mg IV push injection (40 mg Intravenous Given 3/20/19 0009)     11:41 PM  Patient is somewhat more comfortable, he now describes that the pain is more central in his back posterior to the epigastric area.  No tenderness on exam in the back area however there is tenderness in the epigastrium.  I reviewed his labs which are available at this point, the lab is having some difficulties this evening as the patient's serum is lipemic and they have had issues with the analyzer and so there is considerable delay in getting a CBC and CMP completed.  I reviewed the abdominal CT with the patient and his elevated lipase.  I recommended that he come in for n.p.o. status, IV fluids and pain control to rest his pancreas and gut.  He is in agreement with this plan.  11:51 PM  We continue to have difficulties with our chemistry analyzer and I am still awaiting a CMP and CBC.  The lab did contact me and notify me that the patient's serum was lipemic, hypertriglyceridemia may be part of his presentation with pancreatitis although alcohol use last night was certainly suspicious as well.  I placed admitting orders for the patient after discussion with the attending hospitalist Ashley Quiros, she is aware that we are still awaiting the CMP and CBC.  My colleague Dr. Rockwell will follow up on the CBC and CMP and make any additional changes to the transition orders that I have placed at this  time.        Assessments & Plan (with Medical Decision Making)   Assessment and plan with medical decision making at the time stamps above.  Briefly this 33-year-old male presents with concerns of abdominal pain and flank pain as discussed in the HPI.  Tenderness on exam as documented.  Lab diagnostics, returned at this time, showed elevated lipase, imaging with CT shows definite evidence of probable pancreatitis the pancreatic head differential discussed.  Plan to admit the patient for IV fluids, pain control, gut rest and further evaluation as indicated.      Disclaimer: This note consists of symbols derived from keyboarding, dictation and/or voice recognition software. As a result, there may be errors in the script that have gone undetected. Please consider this when interpreting information found in this chart.      I have reviewed the nursing notes.    I have reviewed the findings, diagnosis, plan and need for follow up with the patient.             Medication List      There are no discharge medications for this visit.         Final diagnoses:   Acute pancreatitis, unspecified complication status, unspecified pancreatitis type       3/19/2019   [facility] EMERGENCY DEPARTMENT     Moncho Hidalgo MD  03/21/19 0107

## 2019-03-20 NOTE — PROGRESS NOTES
[facility] TRANSPORT NOTE  Data:   Reason for Transport:  To ICU    Booker Quintana was transported to ICU via wheel chair at 1150.  Patient was accompanied by Registered Nurse. Equipment used for transport: Cardiac monitor . Family was aware of reason for transport: yes    Action:  Report: given to Alycia VIZCARRA RN    Response:  Patient's condition when transferred off unit was Stable.    Chelsie Rose RN

## 2019-03-20 NOTE — PROGRESS NOTES
Skin affirmation note    Admitting nurse completed full skin assessment, Ric score and Ric interventions. This writer agrees with the initial skin assessment findings.     ANGELINE CHAUDHARI

## 2019-03-20 NOTE — ED NOTES
Pt pacing in room, presents to Ed for complaint of Low abd pain beginning this morning. Denies nausea, vomiting or diarrhea. No difficulty with urination.

## 2019-03-21 ENCOUNTER — APPOINTMENT (OUTPATIENT)
Dept: CT IMAGING | Facility: CLINIC | Age: 34
DRG: 642 | End: 2019-03-21
Attending: PHYSICIAN ASSISTANT

## 2019-03-21 ENCOUNTER — APPOINTMENT (OUTPATIENT)
Dept: GENERAL RADIOLOGY | Facility: CLINIC | Age: 34
DRG: 642 | End: 2019-03-21
Attending: PHYSICIAN ASSISTANT

## 2019-03-21 LAB
ALBUMIN SERPL-MCNC: 3.2 G/DL (ref 3.4–5)
ALP SERPL-CCNC: 56 U/L (ref 40–150)
ALT SERPL W P-5'-P-CCNC: 57 U/L (ref 0–70)
ANION GAP SERPL CALCULATED.3IONS-SCNC: 8 MMOL/L (ref 3–14)
ANION GAP SERPL CALCULATED.3IONS-SCNC: NORMAL MMOL/L (ref 6–17)
AST SERPL W P-5'-P-CCNC: 33 U/L (ref 0–45)
BASOPHILS # BLD AUTO: 0 10E9/L (ref 0–0.2)
BASOPHILS NFR BLD AUTO: 0.3 %
BILIRUB SERPL-MCNC: 1.9 MG/DL (ref 0.2–1.3)
BUN SERPL-MCNC: 6 MG/DL (ref 7–30)
BUN SERPL-MCNC: NORMAL MG/DL (ref 7–30)
CALCIUM SERPL-MCNC: 8.3 MG/DL (ref 8.5–10.1)
CALCIUM SERPL-MCNC: NORMAL MG/DL (ref 8.5–10.1)
CHLORIDE SERPL-SCNC: 101 MMOL/L (ref 94–109)
CHLORIDE SERPL-SCNC: NORMAL MMOL/L (ref 94–109)
CO2 SERPL-SCNC: 27 MMOL/L (ref 20–32)
CO2 SERPL-SCNC: NORMAL MMOL/L (ref 20–32)
CREAT SERPL-MCNC: 0.8 MG/DL (ref 0.66–1.25)
CREAT SERPL-MCNC: NORMAL MG/DL (ref 0.66–1.25)
DIFFERENTIAL METHOD BLD: ABNORMAL
EOSINOPHIL # BLD AUTO: 0.3 10E9/L (ref 0–0.7)
EOSINOPHIL NFR BLD AUTO: 2.7 %
ERYTHROCYTE [DISTWIDTH] IN BLOOD BY AUTOMATED COUNT: 12.9 % (ref 10–15)
GFR SERPL CREATININE-BSD FRML MDRD: >90 ML/MIN/{1.73_M2}
GFR SERPL CREATININE-BSD FRML MDRD: NORMAL ML/MIN/{1.73_M2}
GLUCOSE BLDC GLUCOMTR-MCNC: 109 MG/DL (ref 70–99)
GLUCOSE BLDC GLUCOMTR-MCNC: 111 MG/DL (ref 70–99)
GLUCOSE BLDC GLUCOMTR-MCNC: 116 MG/DL (ref 70–99)
GLUCOSE BLDC GLUCOMTR-MCNC: 120 MG/DL (ref 70–99)
GLUCOSE BLDC GLUCOMTR-MCNC: 123 MG/DL (ref 70–99)
GLUCOSE BLDC GLUCOMTR-MCNC: 124 MG/DL (ref 70–99)
GLUCOSE BLDC GLUCOMTR-MCNC: 126 MG/DL (ref 70–99)
GLUCOSE BLDC GLUCOMTR-MCNC: 130 MG/DL (ref 70–99)
GLUCOSE BLDC GLUCOMTR-MCNC: 132 MG/DL (ref 70–99)
GLUCOSE BLDC GLUCOMTR-MCNC: 132 MG/DL (ref 70–99)
GLUCOSE BLDC GLUCOMTR-MCNC: 134 MG/DL (ref 70–99)
GLUCOSE BLDC GLUCOMTR-MCNC: 135 MG/DL (ref 70–99)
GLUCOSE BLDC GLUCOMTR-MCNC: 135 MG/DL (ref 70–99)
GLUCOSE BLDC GLUCOMTR-MCNC: 136 MG/DL (ref 70–99)
GLUCOSE SERPL-MCNC: 131 MG/DL (ref 70–99)
GLUCOSE SERPL-MCNC: NORMAL MG/DL (ref 70–99)
HCT VFR BLD AUTO: 40.3 % (ref 40–53)
HGB BLD-MCNC: 13.9 G/DL (ref 13.3–17.7)
IMM GRANULOCYTES # BLD: 0.1 10E9/L (ref 0–0.4)
IMM GRANULOCYTES NFR BLD: 0.4 %
LACTATE BLD-SCNC: 0.8 MMOL/L (ref 0.7–2)
LYMPHOCYTES # BLD AUTO: 1.4 10E9/L (ref 0.8–5.3)
LYMPHOCYTES NFR BLD AUTO: 11.8 %
MCH RBC QN AUTO: 29.3 PG (ref 26.5–33)
MCHC RBC AUTO-ENTMCNC: 34.5 G/DL (ref 31.5–36.5)
MCV RBC AUTO: 85 FL (ref 78–100)
MONOCYTES # BLD AUTO: 1 10E9/L (ref 0–1.3)
MONOCYTES NFR BLD AUTO: 8.5 %
NEUTROPHILS # BLD AUTO: 9.2 10E9/L (ref 1.6–8.3)
NEUTROPHILS NFR BLD AUTO: 76.3 %
NRBC # BLD AUTO: 0 10*3/UL
NRBC BLD AUTO-RTO: 0 /100
PLATELET # BLD AUTO: 196 10E9/L (ref 150–450)
POTASSIUM SERPL-SCNC: 3.7 MMOL/L (ref 3.4–5.3)
POTASSIUM SERPL-SCNC: NORMAL MMOL/L (ref 3.4–5.3)
PROT SERPL-MCNC: 6.8 G/DL (ref 6.8–8.8)
RBC # BLD AUTO: 4.75 10E12/L (ref 4.4–5.9)
SODIUM SERPL-SCNC: 136 MMOL/L (ref 133–144)
SODIUM SERPL-SCNC: NORMAL MMOL/L (ref 133–144)
TRIGL SERPL-MCNC: 1263 MG/DL
TRIGL SERPL-MCNC: 801 MG/DL
WBC # BLD AUTO: 12.2 10E9/L (ref 4–11)

## 2019-03-21 PROCEDURE — 83605 ASSAY OF LACTIC ACID: CPT | Performed by: INTERNAL MEDICINE

## 2019-03-21 PROCEDURE — 25800030 ZZH RX IP 258 OP 636: Performed by: PHYSICIAN ASSISTANT

## 2019-03-21 PROCEDURE — 74019 RADEX ABDOMEN 2 VIEWS: CPT

## 2019-03-21 PROCEDURE — 25000128 H RX IP 250 OP 636: Performed by: INTERNAL MEDICINE

## 2019-03-21 PROCEDURE — 85027 COMPLETE CBC AUTOMATED: CPT | Performed by: PHYSICIAN ASSISTANT

## 2019-03-21 PROCEDURE — 25000128 H RX IP 250 OP 636: Performed by: PHYSICIAN ASSISTANT

## 2019-03-21 PROCEDURE — 36415 COLL VENOUS BLD VENIPUNCTURE: CPT | Performed by: INTERNAL MEDICINE

## 2019-03-21 PROCEDURE — C9113 INJ PANTOPRAZOLE SODIUM, VIA: HCPCS | Performed by: PHYSICIAN ASSISTANT

## 2019-03-21 PROCEDURE — 36415 COLL VENOUS BLD VENIPUNCTURE: CPT | Performed by: PHYSICIAN ASSISTANT

## 2019-03-21 PROCEDURE — 99233 SBSQ HOSP IP/OBS HIGH 50: CPT | Performed by: INTERNAL MEDICINE

## 2019-03-21 PROCEDURE — 87040 BLOOD CULTURE FOR BACTERIA: CPT | Performed by: PHYSICIAN ASSISTANT

## 2019-03-21 PROCEDURE — 25000132 ZZH RX MED GY IP 250 OP 250 PS 637: Performed by: PHYSICIAN ASSISTANT

## 2019-03-21 PROCEDURE — 20000003 ZZH R&B ICU

## 2019-03-21 PROCEDURE — 84478 ASSAY OF TRIGLYCERIDES: CPT | Performed by: PHYSICIAN ASSISTANT

## 2019-03-21 PROCEDURE — 00000146 ZZHCL STATISTIC GLUCOSE BY METER IP

## 2019-03-21 PROCEDURE — 74177 CT ABD & PELVIS W/CONTRAST: CPT

## 2019-03-21 PROCEDURE — 80053 COMPREHEN METABOLIC PANEL: CPT | Performed by: PHYSICIAN ASSISTANT

## 2019-03-21 PROCEDURE — 25000125 ZZHC RX 250: Performed by: INTERNAL MEDICINE

## 2019-03-21 PROCEDURE — 25000131 ZZH RX MED GY IP 250 OP 636 PS 637: Performed by: PHYSICIAN ASSISTANT

## 2019-03-21 PROCEDURE — 25000128 H RX IP 250 OP 636: Performed by: FAMILY MEDICINE

## 2019-03-21 PROCEDURE — 85004 AUTOMATED DIFF WBC COUNT: CPT | Performed by: PHYSICIAN ASSISTANT

## 2019-03-21 RX ORDER — IOPAMIDOL 755 MG/ML
100 INJECTION, SOLUTION INTRAVASCULAR ONCE
Status: COMPLETED | OUTPATIENT
Start: 2019-03-21 | End: 2019-03-21

## 2019-03-21 RX ORDER — HYDRALAZINE HYDROCHLORIDE 20 MG/ML
5 INJECTION INTRAMUSCULAR; INTRAVENOUS EVERY 6 HOURS PRN
Status: DISCONTINUED | OUTPATIENT
Start: 2019-03-21 | End: 2019-03-25 | Stop reason: HOSPADM

## 2019-03-21 RX ADMIN — ACETAMINOPHEN 650 MG: 325 TABLET, FILM COATED ORAL at 06:06

## 2019-03-21 RX ADMIN — HYDROMORPHONE HYDROCHLORIDE 0.5 MG: 1 INJECTION, SOLUTION INTRAMUSCULAR; INTRAVENOUS; SUBCUTANEOUS at 15:42

## 2019-03-21 RX ADMIN — SODIUM CHLORIDE 2 UNITS/HR: 9 INJECTION, SOLUTION INTRAVENOUS at 12:39

## 2019-03-21 RX ADMIN — PROCHLORPERAZINE EDISYLATE 10 MG: 5 INJECTION INTRAMUSCULAR; INTRAVENOUS at 23:23

## 2019-03-21 RX ADMIN — HYDROMORPHONE HYDROCHLORIDE 0.5 MG: 1 INJECTION, SOLUTION INTRAMUSCULAR; INTRAVENOUS; SUBCUTANEOUS at 17:17

## 2019-03-21 RX ADMIN — ONDANSETRON 4 MG: 2 INJECTION INTRAMUSCULAR; INTRAVENOUS at 20:34

## 2019-03-21 RX ADMIN — HYDROMORPHONE HYDROCHLORIDE 0.5 MG: 1 INJECTION, SOLUTION INTRAMUSCULAR; INTRAVENOUS; SUBCUTANEOUS at 13:28

## 2019-03-21 RX ADMIN — ACETAMINOPHEN 650 MG: 325 TABLET, FILM COATED ORAL at 12:30

## 2019-03-21 RX ADMIN — PANTOPRAZOLE SODIUM 40 MG: 40 INJECTION, POWDER, FOR SOLUTION INTRAVENOUS at 10:09

## 2019-03-21 RX ADMIN — HYDROMORPHONE HYDROCHLORIDE 0.5 MG: 1 INJECTION, SOLUTION INTRAMUSCULAR; INTRAVENOUS; SUBCUTANEOUS at 14:35

## 2019-03-21 RX ADMIN — HYDROMORPHONE HYDROCHLORIDE 0.5 MG: 1 INJECTION, SOLUTION INTRAMUSCULAR; INTRAVENOUS; SUBCUTANEOUS at 05:58

## 2019-03-21 RX ADMIN — SODIUM CHLORIDE 69 ML: 9 INJECTION, SOLUTION INTRAVENOUS at 22:03

## 2019-03-21 RX ADMIN — ACETAMINOPHEN 650 MG: 325 TABLET, FILM COATED ORAL at 20:07

## 2019-03-21 RX ADMIN — HYDROMORPHONE HYDROCHLORIDE 0.5 MG: 1 INJECTION, SOLUTION INTRAMUSCULAR; INTRAVENOUS; SUBCUTANEOUS at 10:21

## 2019-03-21 RX ADMIN — HYDROMORPHONE HYDROCHLORIDE 0.5 MG: 1 INJECTION, SOLUTION INTRAMUSCULAR; INTRAVENOUS; SUBCUTANEOUS at 20:07

## 2019-03-21 RX ADMIN — HYDROMORPHONE HYDROCHLORIDE 0.5 MG: 1 INJECTION, SOLUTION INTRAMUSCULAR; INTRAVENOUS; SUBCUTANEOUS at 23:23

## 2019-03-21 RX ADMIN — HYDROMORPHONE HYDROCHLORIDE 0.5 MG: 1 INJECTION, SOLUTION INTRAMUSCULAR; INTRAVENOUS; SUBCUTANEOUS at 00:55

## 2019-03-21 RX ADMIN — SODIUM CHLORIDE, SODIUM LACTATE, POTASSIUM CHLORIDE, CALCIUM CHLORIDE AND DEXTROSE MONOHYDRATE: 5; 600; 310; 30; 20 INJECTION, SOLUTION INTRAVENOUS at 23:42

## 2019-03-21 RX ADMIN — HYDROMORPHONE HYDROCHLORIDE 0.5 MG: 1 INJECTION, SOLUTION INTRAMUSCULAR; INTRAVENOUS; SUBCUTANEOUS at 08:56

## 2019-03-21 RX ADMIN — HYDROMORPHONE HYDROCHLORIDE 0.5 MG: 1 INJECTION, SOLUTION INTRAMUSCULAR; INTRAVENOUS; SUBCUTANEOUS at 05:20

## 2019-03-21 RX ADMIN — IOPAMIDOL 100 ML: 755 INJECTION, SOLUTION INTRAVENOUS at 22:02

## 2019-03-21 RX ADMIN — ONDANSETRON 4 MG: 2 INJECTION INTRAMUSCULAR; INTRAVENOUS at 10:24

## 2019-03-21 RX ADMIN — ONDANSETRON 4 MG: 2 INJECTION INTRAMUSCULAR; INTRAVENOUS at 06:06

## 2019-03-21 RX ADMIN — SODIUM CHLORIDE, SODIUM LACTATE, POTASSIUM CHLORIDE, CALCIUM CHLORIDE AND DEXTROSE MONOHYDRATE: 5; 600; 310; 30; 20 INJECTION, SOLUTION INTRAVENOUS at 05:51

## 2019-03-21 RX ADMIN — HYDROMORPHONE HYDROCHLORIDE 0.5 MG: 1 INJECTION, SOLUTION INTRAMUSCULAR; INTRAVENOUS; SUBCUTANEOUS at 12:31

## 2019-03-21 RX ADMIN — HYDROMORPHONE HYDROCHLORIDE 0.5 MG: 1 INJECTION, SOLUTION INTRAMUSCULAR; INTRAVENOUS; SUBCUTANEOUS at 07:39

## 2019-03-21 RX ADMIN — SODIUM CHLORIDE, SODIUM LACTATE, POTASSIUM CHLORIDE, CALCIUM CHLORIDE AND DEXTROSE MONOHYDRATE: 5; 600; 310; 30; 20 INJECTION, SOLUTION INTRAVENOUS at 14:28

## 2019-03-21 RX ADMIN — HYDROMORPHONE HYDROCHLORIDE 0.5 MG: 1 INJECTION, SOLUTION INTRAMUSCULAR; INTRAVENOUS; SUBCUTANEOUS at 03:44

## 2019-03-21 RX ADMIN — HYDROMORPHONE HYDROCHLORIDE 0.5 MG: 1 INJECTION, SOLUTION INTRAMUSCULAR; INTRAVENOUS; SUBCUTANEOUS at 18:15

## 2019-03-21 RX ADMIN — PANTOPRAZOLE SODIUM 40 MG: 40 INJECTION, POWDER, FOR SOLUTION INTRAVENOUS at 23:23

## 2019-03-21 RX ADMIN — HYDROMORPHONE HYDROCHLORIDE 0.5 MG: 1 INJECTION, SOLUTION INTRAMUSCULAR; INTRAVENOUS; SUBCUTANEOUS at 21:35

## 2019-03-21 ASSESSMENT — PAIN DESCRIPTION - DESCRIPTORS
DESCRIPTORS: PRESSURE
DESCRIPTORS: ACHING;CRAMPING
DESCRIPTORS: ACHING;CRAMPING;PRESSURE
DESCRIPTORS: ACHING;CRAMPING;DISCOMFORT;PRESSURE
DESCRIPTORS: ACHING;CRAMPING;PRESSURE
DESCRIPTORS: ACHING;CRAMPING

## 2019-03-21 ASSESSMENT — ACTIVITIES OF DAILY LIVING (ADL)
ADLS_ACUITY_SCORE: 10

## 2019-03-21 ASSESSMENT — MIFFLIN-ST. JEOR: SCORE: 2046.25

## 2019-03-21 NOTE — PROGRESS NOTES
Pt continues to have significant pain in RLQ which radiates to back, temperature decreased to 100.3.

## 2019-03-21 NOTE — PROGRESS NOTES
At 1200 pt states pain at a level 6, pt ambulated in hallway with only stand by assist, steady on feet, this increased pain to a level 7, have given 0.5 mg of dilaudid every 1 hour since (pt prefers to get pain med every one hour at lower dose than higher dose every 2) and now pt states pain improved at a level 5. Lipase 345 No nausea this afternoon, taking very small sips of breeze boost. Abdomen distended rounded, bowel sounds positive on right, decreased on left. States he feels hungry today.   Temperature 101.6, tylenol given, recheck on temperature 2 hours later, 101.7.   Insulin drip at 2 unit(s)/hr, glucose 134 and 136 being checked every 2 hours. Recheck of triglycerides at 1800. D5LR at 125 ml/hr. Voiding clear lindy urine in adequate amounts.

## 2019-03-21 NOTE — PROGRESS NOTES
Temp up to 102 oral. Tylenol given with Zofran and dilaudid for RLQ pain which radiates into his back. Taking only occasional sips of H20.

## 2019-03-21 NOTE — PROGRESS NOTES
"Patient has remained on Insulin Gtt at 2 units per hour during the last 12 hours. BS stable, Triglycerides continue to decrease. Patient has had increasing abdominal pain during the night which he feels is more localized into the RLQ than  Previously \"Im having more pain than when I came here\". Dilaudid is helping but wears off more quickly. BS are hypo throughout, he denies BM or passing flatus. Abdomen seems more distended then earlier in the shift. He has had fevers throughout the last 12 hours 102 max treated with Tylenol. Patient gets nauseated from Tylenol. He has only taken small sips of water/breeze. On call provider ANDER Miguel notified of changing condition.  "

## 2019-03-21 NOTE — PROGRESS NOTES
[facility]    Hospital Medicine Follow up Note  Date of Service: 03/20/2019      Triglycerides 2305 down from 4463 which seems adequate progress. Goal < 500. Urine output picking up.    - continue insulin infusion at 2 units/h   - decrease IVF  Vasyl Nixon MD  [facility] Medicine

## 2019-03-21 NOTE — PROGRESS NOTES
[facility]    Hospital Medicine Progress Note  Date of Service: 03/21/2019    Assessment & Plan   Booker Quintana is a 33 year old male admitted on 3/19/2019. He presents with abdominal pain.    Abd/pelvis CT - no contrast - Stone Protocol    Impression    IMPRESSION:  1. Inflammation surrounding the head of the pancreas likely related to  pancreatitis. Its possible duodenitis or duodenal ulcer could  contribute to this appearance but most likely related to pancreatitis.  2. Prominent fatty infiltration of the liver.  3. Mildly prominent spleen.    PORFIRIO SAMUEL MD       Acute pancreatitis due to hypertriglyceridemia    Acute onset low back pain the morning of admission, throughout day moved to mid-abdomen and epigastrium. Pain is constant and sharp. Occasional ETOH use (3-5 drinks/month). CT shows likely pancreatitis with possible duodenitis/duodenal ulcer, fatty liver, prominent spleen. Lipase mildly elevated. WBC 13.3 -> 11.9, Lipase 526 -> 345, ALT 95 -> 79, AST 73 -> 42, EtOH 0.     Started on aggressive IV fluids on admission. Triglycerides returned elevated at 4463. Likely pancreatitis is due to the hypertriglyceridemia and possibly addition of alcohol consumption before onset.     Clinically, appears to be improving except for abdomen pain which has increased along right colic gutter. Doubt would be appendicitis as had normal appendix by CT on admission. Likely pancreatic inflammatory fluid moving down the right pericolic gutter. Good urine output.    - NPO except some nutritional supplement with Boost Breeze   - can reduce IVF to 125   - Dilaudid 0.5-1mg q1h prn    Hypertriglyceridemia    4463 day after admission. He does have some diabetes and elevated cholesterol his family but no known specific diagnosis of hypertriglyceridemia.  No xanthomas noted on exam.  Denies excessive alcohol use.  Not on any medications known to cause hypertriglyceridemia.  A1c 5.7%.    Started on  insulin infusion and D5 to keep glucose up. On only 2 units/hour, triglycerides are coming down nicely, 1200 today. Goal is less than 500.   - continue insulin infusion at 2 units/h   - TG every 12 hours   - eventual statin once recovering    Chronic, severe GERD    Daily heartburn for 10-15 years. Takes daily omeprazole and ranitidine along with frequent Tums. Worse at night, frequently wakes up vomiting. CT did show some findings concerning for duodenitis or possible duodenal ulcer.     Reviewed CT with radiologist, there is duodenitis but no evidence of free air to suggest perforation. Liver with fatty infiltration, GB appears normal as do the ducts.    - IV pantoprazole 40 mg BID   - EGD        Diet: Snacks/Supplements Adult: Boost Breeze; With Meals  NPO for Medical/Clinical Reasons Except for: Meds, Ice Chips, Other; Specify: Boost breeze TID in place of meals    DVT Prophylaxis: Low Risk/Ambulatory with no VTE prophylaxis indicated  Bishop Catheter: not present  Code Status: Full    Discussion: Appears to be stable, pain should improve.     Disposition: Anticipate discharge unclear, likely 3 or more days. ICU for insulin infusion.    Attestation:  Total time: 35 minutes    Vasyl Nixon MD  [facility] Medicine        Interval History   C/o pain moving to right lower quadrant, radiates to back. No chest pain, shortness of breath. Making good urine output.     Physical Exam   Temp:  [98.3  F (36.8  C)-101.8  F (38.8  C)] 100  F (37.8  C)  Pulse:  [] 98  Heart Rate:  [] 105  Resp:  [12-31] 22  BP: (128-151)/() 130/79  FiO2 (%):  [1 %] 1 %  SpO2:  [87 %-98 %] 92 %    Weights:   Vitals:    03/19/19 2122 03/20/19 1200 03/21/19 0513   Weight: 113.4 kg (250 lb 0 oz) 114.7 kg (252 lb 13.9 oz) 109 kg (240 lb 4.8 oz)    Body mass index is 34.48 kg/m .    Constitutional: alert and oriented x 4, appears tired, mild diaphoretic, appears uncomfortable but nontoxic  CV: Regular, no murmur  Respiratory: CTA  bilaterally  GI: mild-moderate distension, very quiet with rare bowel sounds, very tender epigastrium, right abdomen to right lower quadrant, left tender to deeper palpation with pain on right, rebound is not prominent  Skin: Warm and mild diaphoretic    Data   Recent Labs   Lab 03/21/19  0451 03/20/19  0506 03/19/19  2148   WBC 12.2* 11.9* 13.3*   HGB 13.9 Results not available-specimen lipemic Results not available-specimen lipemic   MCV 85 86 85    261 353     Canceled, Test credited 140 140   POTASSIUM 3.7  Canceled, Test credited 3.9 4.1   CHLORIDE 101  Canceled, Test credited 104 106   CO2 27  Canceled, Test credited 25 27   BUN 6*  Canceled, Test credited 14 14   CR 0.80  Canceled, Test credited 0.80 0.78   ANIONGAP 8  Canceled, Test credited 11 7   BRIAN 8.3*  Canceled, Test credited 8.1* 8.5   *  Canceled, Test credited 141* 119*   ALBUMIN 3.2* 3.4 4.0   PROTTOTAL 6.8 6.5* 7.7   BILITOTAL 1.9* 1.0 0.9   ALKPHOS 56 65 76   ALT 57 79* 95*   AST 33 42 73*   LIPASE  --  345 526*       Recent Labs   Lab 03/21/19  0810 03/21/19  0705 03/21/19  0509 03/21/19  0451 03/21/19  0256 03/21/19  0059 03/21/19  0001  03/20/19  0506 03/19/19  2148   GLC  --   --   --  131*  Canceled, Test credited  --   --   --   --  141* 119*   * 111* 130*  --  135* 126* 120*   < >  --   --     < > = values in this interval not displayed.        Unresulted Labs Ordered in the Past 30 Days of this Admission     No orders found from 1/18/2019 to 3/20/2019.           Imaging:   Recent Results (from the past 24 hour(s))   XR Abdomen Port 2 Views    Narrative    XR ABDOMEN PORT 2 VW 3/21/2019 7:09 AM    COMPARISON: CT dated 3/19/2019    HISTORY: Ileus.      Impression    IMPRESSION: No gas-filled loops of distended large or small bowel to  suggest obstruction.    LIZBETH HASSAN MD        I reviewed all new labs and imaging results over the last 24 hours. I personally reviewed the abdominal CT image(s) showing  inflammation about the pancreatic head, increased thickness of duodenal wall in same area, no free air to suggest perforation but cannot rule out duodenal ulcer - reviewed images and discussed with radiologist. .    Medications     dextrose 5% lactated ringers 125 mL/hr at 03/21/19 0551     insulin (regular) 2 Units/hr (03/20/19 1244)       pantoprazole (PROTONIX) IV  40 mg Intravenous Q12H   Reviewd meds    Vasyl Nixon MD  [facility] Medicine

## 2019-03-22 LAB
ALBUMIN SERPL-MCNC: 3 G/DL (ref 3.4–5)
ALP SERPL-CCNC: 57 U/L (ref 40–150)
ALT SERPL W P-5'-P-CCNC: 42 U/L (ref 0–70)
ANION GAP SERPL CALCULATED.3IONS-SCNC: 7 MMOL/L (ref 3–14)
AST SERPL W P-5'-P-CCNC: 21 U/L (ref 0–45)
BILIRUB SERPL-MCNC: 2.1 MG/DL (ref 0.2–1.3)
BUN SERPL-MCNC: 6 MG/DL (ref 7–30)
CALCIUM SERPL-MCNC: 8.7 MG/DL (ref 8.5–10.1)
CHLORIDE SERPL-SCNC: 100 MMOL/L (ref 94–109)
CO2 SERPL-SCNC: 28 MMOL/L (ref 20–32)
CREAT SERPL-MCNC: 0.82 MG/DL (ref 0.66–1.25)
ERYTHROCYTE [DISTWIDTH] IN BLOOD BY AUTOMATED COUNT: 12.9 % (ref 10–15)
GFR SERPL CREATININE-BSD FRML MDRD: >90 ML/MIN/{1.73_M2}
GLUCOSE BLDC GLUCOMTR-MCNC: 101 MG/DL (ref 70–99)
GLUCOSE BLDC GLUCOMTR-MCNC: 113 MG/DL (ref 70–99)
GLUCOSE BLDC GLUCOMTR-MCNC: 119 MG/DL (ref 70–99)
GLUCOSE BLDC GLUCOMTR-MCNC: 120 MG/DL (ref 70–99)
GLUCOSE BLDC GLUCOMTR-MCNC: 122 MG/DL (ref 70–99)
GLUCOSE BLDC GLUCOMTR-MCNC: 125 MG/DL (ref 70–99)
GLUCOSE BLDC GLUCOMTR-MCNC: 126 MG/DL (ref 70–99)
GLUCOSE BLDC GLUCOMTR-MCNC: 130 MG/DL (ref 70–99)
GLUCOSE BLDC GLUCOMTR-MCNC: 130 MG/DL (ref 70–99)
GLUCOSE BLDC GLUCOMTR-MCNC: 132 MG/DL (ref 70–99)
GLUCOSE BLDC GLUCOMTR-MCNC: 138 MG/DL (ref 70–99)
GLUCOSE BLDC GLUCOMTR-MCNC: 139 MG/DL (ref 70–99)
GLUCOSE BLDC GLUCOMTR-MCNC: 143 MG/DL (ref 70–99)
GLUCOSE BLDC GLUCOMTR-MCNC: 145 MG/DL (ref 70–99)
GLUCOSE BLDC GLUCOMTR-MCNC: 147 MG/DL (ref 70–99)
GLUCOSE BLDC GLUCOMTR-MCNC: 155 MG/DL (ref 70–99)
GLUCOSE BLDC GLUCOMTR-MCNC: 162 MG/DL (ref 70–99)
GLUCOSE SERPL-MCNC: 138 MG/DL (ref 70–99)
HCT VFR BLD AUTO: 40.7 % (ref 40–53)
HGB BLD-MCNC: 13.8 G/DL (ref 13.3–17.7)
LACTATE BLD-SCNC: 0.9 MMOL/L (ref 0.7–2)
LIPASE SERPL-CCNC: 99 U/L (ref 73–393)
MCH RBC QN AUTO: 29.1 PG (ref 26.5–33)
MCHC RBC AUTO-ENTMCNC: 33.9 G/DL (ref 31.5–36.5)
MCV RBC AUTO: 86 FL (ref 78–100)
PLATELET # BLD AUTO: 187 10E9/L (ref 150–450)
POTASSIUM SERPL-SCNC: 3.4 MMOL/L (ref 3.4–5.3)
PROT SERPL-MCNC: 7 G/DL (ref 6.8–8.8)
RBC # BLD AUTO: 4.74 10E12/L (ref 4.4–5.9)
SODIUM SERPL-SCNC: 135 MMOL/L (ref 133–144)
TRIGL SERPL-MCNC: 621 MG/DL
TRIGL SERPL-MCNC: 684 MG/DL
WBC # BLD AUTO: 13.6 10E9/L (ref 4–11)

## 2019-03-22 PROCEDURE — 83690 ASSAY OF LIPASE: CPT | Performed by: PHYSICIAN ASSISTANT

## 2019-03-22 PROCEDURE — 25000132 ZZH RX MED GY IP 250 OP 250 PS 637: Performed by: PHYSICIAN ASSISTANT

## 2019-03-22 PROCEDURE — 25000128 H RX IP 250 OP 636: Performed by: INTERNAL MEDICINE

## 2019-03-22 PROCEDURE — C9113 INJ PANTOPRAZOLE SODIUM, VIA: HCPCS | Performed by: PHYSICIAN ASSISTANT

## 2019-03-22 PROCEDURE — 83605 ASSAY OF LACTIC ACID: CPT | Performed by: INTERNAL MEDICINE

## 2019-03-22 PROCEDURE — 25800030 ZZH RX IP 258 OP 636: Performed by: PHYSICIAN ASSISTANT

## 2019-03-22 PROCEDURE — 25000128 H RX IP 250 OP 636: Performed by: PHYSICIAN ASSISTANT

## 2019-03-22 PROCEDURE — 99232 SBSQ HOSP IP/OBS MODERATE 35: CPT | Performed by: INTERNAL MEDICINE

## 2019-03-22 PROCEDURE — 36415 COLL VENOUS BLD VENIPUNCTURE: CPT | Performed by: INTERNAL MEDICINE

## 2019-03-22 PROCEDURE — 80053 COMPREHEN METABOLIC PANEL: CPT | Performed by: PHYSICIAN ASSISTANT

## 2019-03-22 PROCEDURE — 20000003 ZZH R&B ICU

## 2019-03-22 PROCEDURE — 25000131 ZZH RX MED GY IP 250 OP 636 PS 637: Performed by: PHYSICIAN ASSISTANT

## 2019-03-22 PROCEDURE — 25000128 H RX IP 250 OP 636: Performed by: FAMILY MEDICINE

## 2019-03-22 PROCEDURE — 85027 COMPLETE CBC AUTOMATED: CPT | Performed by: PHYSICIAN ASSISTANT

## 2019-03-22 PROCEDURE — 36415 COLL VENOUS BLD VENIPUNCTURE: CPT | Performed by: PHYSICIAN ASSISTANT

## 2019-03-22 PROCEDURE — 00000146 ZZHCL STATISTIC GLUCOSE BY METER IP

## 2019-03-22 PROCEDURE — 84478 ASSAY OF TRIGLYCERIDES: CPT | Performed by: PHYSICIAN ASSISTANT

## 2019-03-22 RX ADMIN — HYDROMORPHONE HYDROCHLORIDE 0.5 MG: 1 INJECTION, SOLUTION INTRAMUSCULAR; INTRAVENOUS; SUBCUTANEOUS at 03:07

## 2019-03-22 RX ADMIN — ACETAMINOPHEN 650 MG: 325 TABLET, FILM COATED ORAL at 04:01

## 2019-03-22 RX ADMIN — TAZOBACTAM SODIUM AND PIPERACILLIN SODIUM 4.5 G: 500; 4 INJECTION, SOLUTION INTRAVENOUS at 06:20

## 2019-03-22 RX ADMIN — ACETAMINOPHEN 650 MG: 325 TABLET, FILM COATED ORAL at 17:35

## 2019-03-22 RX ADMIN — ONDANSETRON 4 MG: 2 INJECTION INTRAMUSCULAR; INTRAVENOUS at 17:43

## 2019-03-22 RX ADMIN — HYDROMORPHONE HYDROCHLORIDE 0.5 MG: 1 INJECTION, SOLUTION INTRAMUSCULAR; INTRAVENOUS; SUBCUTANEOUS at 19:13

## 2019-03-22 RX ADMIN — HYDROMORPHONE HYDROCHLORIDE 0.5 MG: 1 INJECTION, SOLUTION INTRAMUSCULAR; INTRAVENOUS; SUBCUTANEOUS at 14:42

## 2019-03-22 RX ADMIN — SODIUM CHLORIDE, SODIUM LACTATE, POTASSIUM CHLORIDE, CALCIUM CHLORIDE AND DEXTROSE MONOHYDRATE: 5; 600; 310; 30; 20 INJECTION, SOLUTION INTRAVENOUS at 17:35

## 2019-03-22 RX ADMIN — HYDROMORPHONE HYDROCHLORIDE 0.5 MG: 1 INJECTION, SOLUTION INTRAMUSCULAR; INTRAVENOUS; SUBCUTANEOUS at 20:43

## 2019-03-22 RX ADMIN — OXYCODONE HYDROCHLORIDE 5 MG: 5 TABLET ORAL at 22:25

## 2019-03-22 RX ADMIN — ACETAMINOPHEN 650 MG: 325 TABLET, FILM COATED ORAL at 00:10

## 2019-03-22 RX ADMIN — HYDROMORPHONE HYDROCHLORIDE 0.5 MG: 1 INJECTION, SOLUTION INTRAMUSCULAR; INTRAVENOUS; SUBCUTANEOUS at 13:15

## 2019-03-22 RX ADMIN — ACETAMINOPHEN 650 MG: 325 TABLET, FILM COATED ORAL at 13:04

## 2019-03-22 RX ADMIN — ACETAMINOPHEN 650 MG: 325 TABLET, FILM COATED ORAL at 08:04

## 2019-03-22 RX ADMIN — ACETAMINOPHEN 650 MG: 325 TABLET, FILM COATED ORAL at 21:18

## 2019-03-22 RX ADMIN — ONDANSETRON 4 MG: 2 INJECTION INTRAMUSCULAR; INTRAVENOUS at 08:13

## 2019-03-22 RX ADMIN — HYDROMORPHONE HYDROCHLORIDE 0.5 MG: 1 INJECTION, SOLUTION INTRAMUSCULAR; INTRAVENOUS; SUBCUTANEOUS at 22:25

## 2019-03-22 RX ADMIN — HYDROMORPHONE HYDROCHLORIDE 0.5 MG: 1 INJECTION, SOLUTION INTRAMUSCULAR; INTRAVENOUS; SUBCUTANEOUS at 15:54

## 2019-03-22 RX ADMIN — TAZOBACTAM SODIUM AND PIPERACILLIN SODIUM 4.5 G: 500; 4 INJECTION, SOLUTION INTRAVENOUS at 12:15

## 2019-03-22 RX ADMIN — PANTOPRAZOLE SODIUM 40 MG: 40 INJECTION, POWDER, FOR SOLUTION INTRAVENOUS at 21:20

## 2019-03-22 RX ADMIN — TAZOBACTAM SODIUM AND PIPERACILLIN SODIUM 4.5 G: 500; 4 INJECTION, SOLUTION INTRAVENOUS at 00:06

## 2019-03-22 RX ADMIN — Medication 1 MG: at 23:01

## 2019-03-22 RX ADMIN — PANTOPRAZOLE SODIUM 40 MG: 40 INJECTION, POWDER, FOR SOLUTION INTRAVENOUS at 10:36

## 2019-03-22 RX ADMIN — TAZOBACTAM SODIUM AND PIPERACILLIN SODIUM 4.5 G: 500; 4 INJECTION, SOLUTION INTRAVENOUS at 19:08

## 2019-03-22 RX ADMIN — SODIUM CHLORIDE 2 UNITS/HR: 9 INJECTION, SOLUTION INTRAVENOUS at 13:42

## 2019-03-22 RX ADMIN — SODIUM CHLORIDE, SODIUM LACTATE, POTASSIUM CHLORIDE, CALCIUM CHLORIDE AND DEXTROSE MONOHYDRATE: 5; 600; 310; 30; 20 INJECTION, SOLUTION INTRAVENOUS at 08:00

## 2019-03-22 RX ADMIN — HYDROMORPHONE HYDROCHLORIDE 0.5 MG: 1 INJECTION, SOLUTION INTRAMUSCULAR; INTRAVENOUS; SUBCUTANEOUS at 08:03

## 2019-03-22 RX ADMIN — PROCHLORPERAZINE EDISYLATE 10 MG: 5 INJECTION INTRAMUSCULAR; INTRAVENOUS at 08:13

## 2019-03-22 RX ADMIN — HYDROMORPHONE HYDROCHLORIDE 0.5 MG: 1 INJECTION, SOLUTION INTRAMUSCULAR; INTRAVENOUS; SUBCUTANEOUS at 10:36

## 2019-03-22 RX ADMIN — HYDROMORPHONE HYDROCHLORIDE 0.5 MG: 1 INJECTION, SOLUTION INTRAMUSCULAR; INTRAVENOUS; SUBCUTANEOUS at 17:35

## 2019-03-22 ASSESSMENT — ACTIVITIES OF DAILY LIVING (ADL)
ADLS_ACUITY_SCORE: 10

## 2019-03-22 NOTE — PROGRESS NOTES
"Pt with complaints of nausea this am and reflux.  Compazine and zofran given.  Pain was at 8/10 this morning and pt stated they \"felt worse than yesterday\" this morning.  Dilaudid given and tylenol, pain was mostly on right side and radiates to back, with some on left side.    "

## 2019-03-22 NOTE — PLAN OF CARE
Pt continues to have pain partially controlled with 0.5 IV dilaudid given every 3 hours overnight. Walked the halls x1. Hypoactive bowel sounds. C/o nausea this evening, medicated with Zofran and Compazine with relief. Febrile overnight max temp 102.9 lowest 100.0 650 mg of tylenol given every 4 hours. Insulin drip running @ 2 units/hr with BS 120s. Triglycerides trending down to 684 this AM. Preliminary BC are negative. Pt triggered the sepsis protocol; lactate was 0.8.  New IV started for Zosyn. Pt appeared to sleep well. Up with SBA.

## 2019-03-22 NOTE — PROGRESS NOTES
[facility]    Hospital Medicine Progress Note  Date of Service: 03/22/2019    Assessment & Plan   Booker Quintana is a 33 year old male admitted on 3/19/2019. He presents with abdominal pain.    Abd/pelvis CT - no contrast - Stone Protocol    Impression    IMPRESSION:  1. Inflammation surrounding the head of the pancreas likely related to  pancreatitis. Its possible duodenitis or duodenal ulcer could  contribute to this appearance but most likely related to pancreatitis.  2. Prominent fatty infiltration of the liver.  3. Mildly prominent spleen.    PORFIRIO SAMUEL MD       Acute pancreatitis due to hypertriglyceridemia    Acute onset low back pain the morning of admission, throughout day moved to mid-abdomen and epigastrium. Pain is constant and sharp. Occasional ETOH use (3-5 drinks/month). CT shows likely pancreatitis with possible duodenitis/duodenal ulcer, fatty liver, prominent spleen. Lipase mildly elevated 526,    Started on aggressive IV fluids on admission. Triglycerides returned elevated at 4463. Likely pancreatitis is due to the hypertriglyceridemia and possibly addition of alcohol consumption before onset.     Abdominal pain extended along right colic gutter and developed worsening fevers 3/21. Empiric Zosyn started. Repeat CT abdomen/pelvis shows extension of inflammatory process, no appendicitis or free air.     Pain a bit better this morning. Good perfusion.    - continue NPO except some nutritional supplement with Boost Breeze   - continue    - Dilaudid 0.5-1mg q1h prn   - continue empiric Zosyn for now    Hypertriglyceridemia    4463 day after admission. He does have some diabetes and elevated cholesterol his family but no known specific diagnosis of hypertriglyceridemia.  No xanthomas noted on exam.  Denies excessive alcohol use.  Not on any medications known to cause hypertriglyceridemia.  A1c 5.7%.    Started on insulin infusion and D5 to keep glucose up. On only  2 units/hour, triglycerides have come down to 684 though decrease is now more gradual. Goal is less than 500.   - increase insulin infusion to 3 units/hr   - TG every 12 hours   - eventual statin, diet modification and weight loss once recovering    Chronic, severe GERD    Daily heartburn for 10-15 years. Takes daily omeprazole and ranitidine along with frequent Tums. Worse at night, frequently wakes up vomiting. CT did show some findings concerning for duodenitis or possible duodenal ulcer.     Reviewed CT with radiologist, there is duodenitis but no evidence of free air to suggest perforation. Liver with fatty infiltration, GB appears normal as do the ducts. Reflux symptoms better on IV PPI.    - IV pantoprazole 40 mg BID   - EGD as outpatient        Diet: NPO for Medical/Clinical Reasons Except for: Meds, Ice Chips, Other; Specify: Boost breeze or Boost TID in place of meals  Snacks/Supplements Adult: Boost Breeze; With Meals    DVT Prophylaxis: Low Risk/Ambulatory with no VTE prophylaxis indicated  Bishop Catheter: not present  Code Status: Full    Discussion: May be turning the corner regarding pancreatitis pain    Disposition: Anticipate discharge 2-3 days     Attestation:  Total time: 25 minutes    Vasyl Nixon MD  [facility] Medicine        Interval History   Last night, Zosyn started and repeat CT due to worsening right lower quadrant pain and increased fever to 102.4.     Pain improved for a while this morning then severe again. Feels a little better, no appetite. No reflux. C/o dyspnea on exertion when up walking due to pain with deep breaths.     Physical Exam   Temp:  [98.8  F (37.1  C)-102.9  F (39.4  C)] 98.8  F (37.1  C)  Pulse:  [] 101  Heart Rate:  [] 102  Resp:  [13-32] 18  BP: (127-160)/() 127/86  SpO2:  [92 %-97 %] 95 %    Weights:   Vitals:    03/19/19 2122 03/20/19 1200 03/21/19 0513   Weight: 113.4 kg (250 lb 0 oz) 114.7 kg (252 lb 13.9 oz) 109 kg (240 lb 4.8 oz)    Body mass  index is 34.48 kg/m .    Constitutional: alert and oriented, appears more comfortable  CV: Regular, no edema  Respiratory: decreased breath ssounds in bases with a few bibasilar crackles otherwise CTA bilaterally  GI: moderate distension, very tender to mild palpation epigastrium, right upper quadrant, right lower quadrant and tender to moderate palpation left side.  Skin: Warm and dry    Data   Recent Labs   Lab 03/22/19  0456 03/21/19  0451 03/20/19  0506   WBC 13.6* 12.2* 11.9*   HGB 13.8 13.9 Results not available-specimen lipemic   MCV 86 85 86    196 261    136  Canceled, Test credited 140   POTASSIUM 3.4 3.7  Canceled, Test credited 3.9   CHLORIDE 100 101  Canceled, Test credited 104   CO2 28 27  Canceled, Test credited 25   BUN 6* 6*  Canceled, Test credited 14   CR 0.82 0.80  Canceled, Test credited 0.80   ANIONGAP 7 8  Canceled, Test credited 11   BRIAN 8.7 8.3*  Canceled, Test credited 8.1*   * 131*  Canceled, Test credited 141*   ALBUMIN 3.0* 3.2* 3.4   PROTTOTAL 7.0 6.8 6.5*   BILITOTAL 2.1* 1.9* 1.0   ALKPHOS 57 56 65   ALT 42 57 79*   AST 21 33 42   LIPASE 99  --  345       Recent Labs   Lab 03/22/19  1034 03/22/19  0824 03/22/19  0654 03/22/19  0456 03/22/19  0455 03/22/19  0305 03/22/19  0058  03/21/19  0451  03/20/19  0506 03/19/19  2148   GLC  --   --   --  138*  --   --   --   --  131*  Canceled, Test credited  --  141* 119*   * 139* 138*  --  147* 143* 132*   < >  --    < >  --   --     < > = values in this interval not displayed.        Unresulted Labs Ordered in the Past 30 Days of this Admission     Date and Time Order Name Status Description    3/21/2019 2037 Blood culture Preliminary     3/21/2019 2037 Blood culture Preliminary            Imaging:   Recent Results (from the past 24 hour(s))   CT Abd/Pelvis w Contrast*    Narrative    CT ABDOMEN AND PELVIS WITH CONTRAST 3/21/2019 10:12 PM     HISTORY: Rule out appendicitis.    CONTRAST DOSE: 100 mL Isovue  -370    Radiation dose for this scan was reduced using automated exposure  control, adjustment of the mA and/or kV according to patient size, or  iterative reconstruction technique.    FINDINGS: Right lower lobe linear atelectasis or fibrosis is noted. No  apparent pleural effusion. Hepatic fatty infiltration is noted. There  is marked retroperitoneal stranding, predominantly along the right  paracolic gutter and inferior to the pancreatic head and extends into  the pelvis. The pancreas itself is otherwise grossly unremarkable. The  kidneys appear within normal limits with no apparent hydronephrosis.  The gallbladder and spleen appear within normal limits. Although there  is soft tissue stranding along the right paracolic gutter, there  appears to be a normal caliber partly gas-filled appendix which is  likely normal. Mild peritoneal fluid is noted within the pelvis, left  greater than right. There is no evidence of bowel obstruction or free  peritoneal air. No other evidence of pericolonic inflammatory  stranding.      Impression    IMPRESSION:  1. Marked retroperitoneal stranding, dramatically increased since  3/19/2019. This is nonspecific, but may be related to pancreatitis.  This extends predominantly along the right paracolic gutter to the  pelvis.  2. Right lower lobe linear atelectasis or fibrosis.  3. Mild free peritoneal fluid within the pelvis, new since 3/19/2019.  4. Marked hepatic fatty infiltration--possible etiologies include  consumption of alcohol or excessive carbohydrate intake, especially  sugar/fructose.  Metabolic syndrome commonly occurs in combination  with nonalcoholic fatty liver disease. Although often reversible,  nonalcoholic fatty liver disease can progress to steatohepatitis and  cirrhosis.    CARIE CAMACHO MD        I reviewed all new labs and imaging results over the last 24 hours. I personally reviewed the abdominal CT image(s) showing extensive inflammatory changes from  pancreatic head down r pericolic gutter, no free air. .    Medications     dextrose 5% lactated ringers 125 mL/hr at 03/22/19 0800     insulin (regular) 2 Units/hr (03/22/19 0830)       pantoprazole (PROTONIX) IV  40 mg Intravenous Q12H     piperacillin-tazobactam  4.5 g Intravenous Q6H   Reviewed capris    Vasyl Nixon MD  [facility] Medicine

## 2019-03-22 NOTE — PROGRESS NOTES
Giving pain meds every one to one and half hour, ETCO2 applied, currently 43, on room air sat 92%. Respiratory rate has remained stable. Pt alert and oriented. Continue to monitor closely

## 2019-03-22 NOTE — PROVIDER NOTIFICATION
Pt had increased pain and a temp of 102.9 @2000. Ariadne WORTHINGTON updated. Orders obtained for blood cultures x2 CT and pt started on Zosyn.

## 2019-03-23 LAB
ALBUMIN SERPL-MCNC: 2.8 G/DL (ref 3.4–5)
ALP SERPL-CCNC: 58 U/L (ref 40–150)
ALT SERPL W P-5'-P-CCNC: 45 U/L (ref 0–70)
ANION GAP SERPL CALCULATED.3IONS-SCNC: 6 MMOL/L (ref 3–14)
AST SERPL W P-5'-P-CCNC: 24 U/L (ref 0–45)
BILIRUB SERPL-MCNC: 1.2 MG/DL (ref 0.2–1.3)
BUN SERPL-MCNC: 7 MG/DL (ref 7–30)
CALCIUM SERPL-MCNC: 8.6 MG/DL (ref 8.5–10.1)
CHLORIDE SERPL-SCNC: 100 MMOL/L (ref 94–109)
CO2 SERPL-SCNC: 30 MMOL/L (ref 20–32)
CREAT SERPL-MCNC: 0.85 MG/DL (ref 0.66–1.25)
ERYTHROCYTE [DISTWIDTH] IN BLOOD BY AUTOMATED COUNT: 13 % (ref 10–15)
GFR SERPL CREATININE-BSD FRML MDRD: >90 ML/MIN/{1.73_M2}
GLUCOSE BLDC GLUCOMTR-MCNC: 100 MG/DL (ref 70–99)
GLUCOSE BLDC GLUCOMTR-MCNC: 113 MG/DL (ref 70–99)
GLUCOSE BLDC GLUCOMTR-MCNC: 115 MG/DL (ref 70–99)
GLUCOSE BLDC GLUCOMTR-MCNC: 116 MG/DL (ref 70–99)
GLUCOSE BLDC GLUCOMTR-MCNC: 117 MG/DL (ref 70–99)
GLUCOSE BLDC GLUCOMTR-MCNC: 117 MG/DL (ref 70–99)
GLUCOSE BLDC GLUCOMTR-MCNC: 123 MG/DL (ref 70–99)
GLUCOSE BLDC GLUCOMTR-MCNC: 126 MG/DL (ref 70–99)
GLUCOSE BLDC GLUCOMTR-MCNC: 128 MG/DL (ref 70–99)
GLUCOSE BLDC GLUCOMTR-MCNC: 134 MG/DL (ref 70–99)
GLUCOSE BLDC GLUCOMTR-MCNC: 135 MG/DL (ref 70–99)
GLUCOSE BLDC GLUCOMTR-MCNC: 140 MG/DL (ref 70–99)
GLUCOSE SERPL-MCNC: 119 MG/DL (ref 70–99)
HCT VFR BLD AUTO: 39 % (ref 40–53)
HGB BLD-MCNC: 12.9 G/DL (ref 13.3–17.7)
MCH RBC QN AUTO: 29 PG (ref 26.5–33)
MCHC RBC AUTO-ENTMCNC: 33.1 G/DL (ref 31.5–36.5)
MCV RBC AUTO: 88 FL (ref 78–100)
PLATELET # BLD AUTO: 187 10E9/L (ref 150–450)
POTASSIUM SERPL-SCNC: 3.4 MMOL/L (ref 3.4–5.3)
PROT SERPL-MCNC: 7 G/DL (ref 6.8–8.8)
RBC # BLD AUTO: 4.45 10E12/L (ref 4.4–5.9)
SODIUM SERPL-SCNC: 136 MMOL/L (ref 133–144)
TRIGL SERPL-MCNC: 536 MG/DL
TRIGL SERPL-MCNC: 578 MG/DL
WBC # BLD AUTO: 12.7 10E9/L (ref 4–11)

## 2019-03-23 PROCEDURE — 25000128 H RX IP 250 OP 636: Performed by: PHYSICIAN ASSISTANT

## 2019-03-23 PROCEDURE — 25000128 H RX IP 250 OP 636: Performed by: FAMILY MEDICINE

## 2019-03-23 PROCEDURE — C9113 INJ PANTOPRAZOLE SODIUM, VIA: HCPCS | Performed by: PHYSICIAN ASSISTANT

## 2019-03-23 PROCEDURE — 84478 ASSAY OF TRIGLYCERIDES: CPT | Performed by: PHYSICIAN ASSISTANT

## 2019-03-23 PROCEDURE — 99233 SBSQ HOSP IP/OBS HIGH 50: CPT | Performed by: INTERNAL MEDICINE

## 2019-03-23 PROCEDURE — 80053 COMPREHEN METABOLIC PANEL: CPT | Performed by: PHYSICIAN ASSISTANT

## 2019-03-23 PROCEDURE — 25800030 ZZH RX IP 258 OP 636: Performed by: INTERNAL MEDICINE

## 2019-03-23 PROCEDURE — 25000128 H RX IP 250 OP 636: Performed by: INTERNAL MEDICINE

## 2019-03-23 PROCEDURE — 25000132 ZZH RX MED GY IP 250 OP 250 PS 637: Performed by: INTERNAL MEDICINE

## 2019-03-23 PROCEDURE — 20000003 ZZH R&B ICU

## 2019-03-23 PROCEDURE — 85027 COMPLETE CBC AUTOMATED: CPT | Performed by: PHYSICIAN ASSISTANT

## 2019-03-23 PROCEDURE — 00000146 ZZHCL STATISTIC GLUCOSE BY METER IP

## 2019-03-23 PROCEDURE — 25000132 ZZH RX MED GY IP 250 OP 250 PS 637: Performed by: PHYSICIAN ASSISTANT

## 2019-03-23 PROCEDURE — 25000131 ZZH RX MED GY IP 250 OP 636 PS 637: Performed by: INTERNAL MEDICINE

## 2019-03-23 PROCEDURE — 36415 COLL VENOUS BLD VENIPUNCTURE: CPT | Performed by: PHYSICIAN ASSISTANT

## 2019-03-23 RX ORDER — HYDROMORPHONE HYDROCHLORIDE 2 MG/1
2 TABLET ORAL EVERY 4 HOURS PRN
Status: DISCONTINUED | OUTPATIENT
Start: 2019-03-23 | End: 2019-03-25 | Stop reason: HOSPADM

## 2019-03-23 RX ADMIN — TAZOBACTAM SODIUM AND PIPERACILLIN SODIUM 4.5 G: 500; 4 INJECTION, SOLUTION INTRAVENOUS at 00:34

## 2019-03-23 RX ADMIN — HYDROMORPHONE HYDROCHLORIDE 0.5 MG: 1 INJECTION, SOLUTION INTRAMUSCULAR; INTRAVENOUS; SUBCUTANEOUS at 11:55

## 2019-03-23 RX ADMIN — ACETAMINOPHEN 650 MG: 325 TABLET, FILM COATED ORAL at 10:14

## 2019-03-23 RX ADMIN — ONDANSETRON 4 MG: 2 INJECTION INTRAMUSCULAR; INTRAVENOUS at 01:15

## 2019-03-23 RX ADMIN — HYDROMORPHONE HYDROCHLORIDE 0.5 MG: 1 INJECTION, SOLUTION INTRAMUSCULAR; INTRAVENOUS; SUBCUTANEOUS at 02:39

## 2019-03-23 RX ADMIN — HYDROMORPHONE HYDROCHLORIDE 2 MG: 2 TABLET ORAL at 17:05

## 2019-03-23 RX ADMIN — HYDROMORPHONE HYDROCHLORIDE 2 MG: 2 TABLET ORAL at 20:43

## 2019-03-23 RX ADMIN — ONDANSETRON 4 MG: 2 INJECTION INTRAMUSCULAR; INTRAVENOUS at 10:05

## 2019-03-23 RX ADMIN — SODIUM CHLORIDE 3 UNITS/HR: 9 INJECTION, SOLUTION INTRAVENOUS at 08:55

## 2019-03-23 RX ADMIN — HYDROMORPHONE HYDROCHLORIDE 0.5 MG: 1 INJECTION, SOLUTION INTRAMUSCULAR; INTRAVENOUS; SUBCUTANEOUS at 20:48

## 2019-03-23 RX ADMIN — HYDROMORPHONE HYDROCHLORIDE 0.5 MG: 1 INJECTION, SOLUTION INTRAMUSCULAR; INTRAVENOUS; SUBCUTANEOUS at 08:14

## 2019-03-23 RX ADMIN — TAZOBACTAM SODIUM AND PIPERACILLIN SODIUM 4.5 G: 500; 4 INJECTION, SOLUTION INTRAVENOUS at 13:00

## 2019-03-23 RX ADMIN — HYDROMORPHONE HYDROCHLORIDE 0.5 MG: 1 INJECTION, SOLUTION INTRAMUSCULAR; INTRAVENOUS; SUBCUTANEOUS at 04:57

## 2019-03-23 RX ADMIN — TAZOBACTAM SODIUM AND PIPERACILLIN SODIUM 4.5 G: 500; 4 INJECTION, SOLUTION INTRAVENOUS at 18:18

## 2019-03-23 RX ADMIN — HYDROMORPHONE HYDROCHLORIDE 0.5 MG: 1 INJECTION, SOLUTION INTRAMUSCULAR; INTRAVENOUS; SUBCUTANEOUS at 10:05

## 2019-03-23 RX ADMIN — SODIUM CHLORIDE, SODIUM LACTATE, POTASSIUM CHLORIDE, CALCIUM CHLORIDE AND DEXTROSE MONOHYDRATE: 5; 600; 310; 30; 20 INJECTION, SOLUTION INTRAVENOUS at 11:08

## 2019-03-23 RX ADMIN — TAZOBACTAM SODIUM AND PIPERACILLIN SODIUM 4.5 G: 500; 4 INJECTION, SOLUTION INTRAVENOUS at 06:53

## 2019-03-23 RX ADMIN — PANTOPRAZOLE SODIUM 40 MG: 40 INJECTION, POWDER, FOR SOLUTION INTRAVENOUS at 22:11

## 2019-03-23 RX ADMIN — HYDROMORPHONE HYDROCHLORIDE 0.5 MG: 1 INJECTION, SOLUTION INTRAMUSCULAR; INTRAVENOUS; SUBCUTANEOUS at 06:53

## 2019-03-23 RX ADMIN — ACETAMINOPHEN 650 MG: 325 TABLET, FILM COATED ORAL at 20:48

## 2019-03-23 RX ADMIN — PROCHLORPERAZINE EDISYLATE 10 MG: 5 INJECTION INTRAMUSCULAR; INTRAVENOUS at 02:00

## 2019-03-23 RX ADMIN — ACETAMINOPHEN 650 MG: 325 TABLET, FILM COATED ORAL at 01:15

## 2019-03-23 RX ADMIN — PANTOPRAZOLE SODIUM 40 MG: 40 INJECTION, POWDER, FOR SOLUTION INTRAVENOUS at 10:06

## 2019-03-23 RX ADMIN — HYDROMORPHONE HYDROCHLORIDE 0.5 MG: 1 INJECTION, SOLUTION INTRAMUSCULAR; INTRAVENOUS; SUBCUTANEOUS at 15:53

## 2019-03-23 RX ADMIN — ACETAMINOPHEN 650 MG: 325 TABLET, FILM COATED ORAL at 15:53

## 2019-03-23 RX ADMIN — HYDROMORPHONE HYDROCHLORIDE 2 MG: 2 TABLET ORAL at 13:00

## 2019-03-23 RX ADMIN — SODIUM CHLORIDE, SODIUM LACTATE, POTASSIUM CHLORIDE, CALCIUM CHLORIDE AND DEXTROSE MONOHYDRATE: 5; 600; 310; 30; 20 INJECTION, SOLUTION INTRAVENOUS at 02:23

## 2019-03-23 RX ADMIN — SODIUM CHLORIDE, SODIUM LACTATE, POTASSIUM CHLORIDE, CALCIUM CHLORIDE AND DEXTROSE MONOHYDRATE: 5; 600; 310; 30; 20 INJECTION, SOLUTION INTRAVENOUS at 19:20

## 2019-03-23 ASSESSMENT — MIFFLIN-ST. JEOR: SCORE: 2029.25

## 2019-03-23 ASSESSMENT — PAIN DESCRIPTION - DESCRIPTORS
DESCRIPTORS: ACHING;INTERMITTENT;SHARP
DESCRIPTORS: BURNING;CONSTANT;DISCOMFORT;SHARP
DESCRIPTORS: ACHING;INTERMITTENT;SHARP
DESCRIPTORS: CRAMPING

## 2019-03-23 ASSESSMENT — ACTIVITIES OF DAILY LIVING (ADL)
ADLS_ACUITY_SCORE: 10
ADLS_ACUITY_SCORE: 12

## 2019-03-23 NOTE — CONSULTS
"CLINICAL NUTRITION SERVICES  -  ASSESSMENT NOTE    REASON FOR ASSESSMENT  Booker Quintana is a 33 year old male seen by Registered Dietitian for Patient/Family Request    NUTRITION HISTORY  - Information obtained from chart  -Noted pt diagnosed with Acute pancreatitis due to hypertriglyceridemia  -Noted pt presented to ED with acute onset of abdominal pain on 3/20  -PMH of chronic, severe GERD  -Noted he does have some diabetes and elevated cholesterol in family  -Writer called to speak with pt's RN in an attempt to connect with patient and/or family regarding nutrition request; unable to connect today, will attempt again tomorrow    CURRENT NUTRITION ORDERS  Diet Order:     Full liquid (toast ok) and Boost Breeze (in place of meals TID, sip slowly over 2 hours)     Current Intake/Tolerance:  Intake of meals not documented in flowsheets    NUTRITION FOCUSED PHYSICAL ASSESSMENT (NFPA)  Completed:           No: not assessed     Observed  Unable to assess   Obtained from Chart/Interdisciplinary Team  None noted    ANTHROPOMETRICS  Height: 5' 10\"  Weight: 236 lbs 8.86 oz  Body mass index is 33.94 kg/m .  Weight Status:  Obesity Grade I BMI 30-34.9  IBW: 166 lbs  % IBW: 142%  Weight History:   Wt Readings from Last 10 Encounters:   03/23/19 107.3 kg (236 lb 8.9 oz)     LABS  Labs reviewed  Alb (L): 2.8  Gluc (H): ranging 113-140 today  Triglycerides (H): 578    MEDICATIONS  Medications reviewed  IVF D5 in lactated ringers @125 mL/hr  IVF insulin 1 units/1 mL saline @ 3 mL/hr  PRN: gluc, zofran, compazine    ASSESSED NUTRITION NEEDS PER APPROVED PRACTICE GUIDELINES:    Dosing Weight 83 kg (ABW)  Estimated Energy Needs: 2572-7489 kcals (25-30 Kcal/Kg)  Justification: maintenance  Estimated Protein Needs: 66-83 grams protein (0.8-1 g pro/Kg)  Justification: maintenance  Estimated Fluid Needs:  (1 mL/Kcal)  Justification: maintenance    MALNUTRITION:  % Weight Loss:  Unable to determine d/t no previous weight history in " EHR  % Intake: unable to determine d/t unable to speak with patient  Subcutaneous Fat Loss:  Not assesed  Muscle Loss:  Not assessed  Fluid Retention:  None noted    Malnutrition Diagnosis: Unable to determine due to full assessment not completed    NUTRITION DIAGNOSIS:  Inadequate protein-energy intake related to abdominal pain as evidenced by full liquid diet and estimated nutrient intake < estimated nutrient needs.     NUTRITION INTERVENTIONS  Recommendations / Nutrition Prescription  Recommend diet advancement when medically indicated.     Implementation  Nutrition education: No education needs assessed at this time    Nutrition Goals  Diet advancement when medically indicated.    MONITORING AND EVALUATION:  Progress towards goals will be monitored and evaluated per protocol and Practice Guidelines    Aline Kumar RD, LD  Clinical Dietitian  [phone]

## 2019-03-23 NOTE — PROGRESS NOTES
Patient and SO report about pt's diet; both acknowledge it is unhealthy. Pt will eat ice cream treats at bedtime and after SO goes to bed will eat a whole pizza. Encourage patient to speak with a nutritionist due to need to change diet. Pt agrees and would like writer to set this up. Order put in for an IP nutrition consult.

## 2019-03-23 NOTE — PROGRESS NOTES
Patient up in shower.  SO other helping patient. Discussed with MD plan to change to a different oral pain medication per patients request.  MD in agreement and will place order.

## 2019-03-23 NOTE — PROGRESS NOTES
Patients temp 100.7, PO tylenol given along with pain medication.  Insulin gtt remains at 3 unit per hour.  Patient placed on 2 L NC while asleep after receiving dilaudid.

## 2019-03-23 NOTE — PROGRESS NOTES
Pt needing IV dilaudid 0.5 q 1 1/2 hours; willing to try Oxycodone with a dose of dilaudid to see if this will be effective for pain control so can eventually discontinue the use of dilaudid to prepare for discharge.

## 2019-03-23 NOTE — PLAN OF CARE
Patient has been tolerating oral dilaudid for abdominal discomfort.  Has been tolerating boost breeze with a few bites of toast without increase in abdominal discomfort.  Denies nausea after eating.  Has been walking in the [place] with stand by assist.

## 2019-03-23 NOTE — PROGRESS NOTES
[facility]    Hospital Medicine Progress Note  Date of Service: 03/23/2019    Assessment & Plan   Booker Quintana is a 33 year old male admitted on 3/19/2019. He presents with abdominal pain.    Abd/pelvis CT - no contrast - Stone Protocol    Impression    IMPRESSION:  1. Inflammation surrounding the head of the pancreas likely related to  pancreatitis. Its possible duodenitis or duodenal ulcer could  contribute to this appearance but most likely related to pancreatitis.  2. Prominent fatty infiltration of the liver.  3. Mildly prominent spleen.    PORFIRIO SAMUEL MD       Acute pancreatitis due to hypertriglyceridemia    Acute onset  Pain in  the morning of admission. Occasional ETOH use (3-5 drinks/month). CT as above.. Lipase mildly elevated 526. Started on aggressive IV fluids on admission. Triglycerides returned elevated at 4463. Abdominal pain extended along right colic gutter and developed worsening fevers 3/21. Empiric Zosyn started. Repeat CT  3/21 abdomen/pelvis shows extension of inflammatory process, no appendicitis or free air.   Continues to have significant pain and continues to spike temp.    - continue Boost Breeze-full diet if he wants to try   - continue    - Dilaudid 0.5-1mg q1h prn-start po dilaudid   - continue empiric Zosyn for now    Hypertriglyceridemia    4463 day after admission. He does have some diabetes and elevated cholesterol his family but no known specific diagnosis of hypertriglyceridemia.  No xanthomas noted on exam.  Denies excessive alcohol use.  Not on any medications known to cause hypertriglyceridemia.  A1c 5.7%.    Started on insulin infusion and D5 to keep glucose up. On 3 units/hour, triglycerides have come down to 578 . Goal is less than 500.   - continue  Infusion at 3 units/hr   - TG every 12 hours   - eventual statin, diet modification and weight loss once recovering    Chronic, severe GERD    Daily heartburn for 10-15 years. Takes  daily omeprazole and ranitidine along with frequent Tums. Worse at night, frequently wakes up vomiting. CT did show some findings concerning for duodenitis or possible duodenal ulcer.     Reviewed CT with radiologist, there is duodenitis but no evidence of free air to suggest perforation. Liver with fatty infiltration, GB appears normal as do the ducts. Reflux symptoms better on IV PPI.    -continue  IV pantoprazole 40 mg BID   - EGD as outpatient        Diet: Snacks/Supplements Adult: Boost Breeze; With Meals  Full Liquid Diet    DVT Prophylaxis: Low Risk/Ambulatory with no VTE prophylaxis indicated  Bishop Catheter: not present  Code Status: Full    Disposition: Anticipate discharge 2-3 days         Interval History   Continues to have pain and is still spiking temps. Wants to try some po full diet today.     Physical Exam   Temp:  [99.4  F (37.4  C)-102.3  F (39.1  C)] 101.7  F (38.7  C)  Pulse:  [] 101  Heart Rate:  [] 100  Resp:  [10-31] 20  BP: (130-156)/(83-94) 148/88  SpO2:  [92 %-98 %] 92 %    Weights:   Vitals:    03/20/19 1200 03/21/19 0513 03/23/19 0600   Weight: 114.7 kg (252 lb 13.9 oz) 109 kg (240 lb 4.8 oz) 107.3 kg (236 lb 8.9 oz)    Body mass index is 33.94 kg/m .    Constitutional: alert and oriented, appears more comfortable  CV: Regular, no edema  Respiratory: decreased breath ssounds in bases with a few bibasilar crackles otherwise CTA bilaterally  GI: moderate distension, very tender to mild palpation epigastrium, right upper quadrant, right lower quadrant and tender to moderate palpation left side  Skin: Warm and dry    Data   Recent Labs   Lab 03/23/19  0541 03/22/19  0456 03/21/19  0451 03/20/19  0506   WBC 12.7* 13.6* 12.2* 11.9*   HGB 12.9* 13.8 13.9 Results not available-specimen lipemic   MCV 88 86 85 86    187 196 261    135 136  Canceled, Test credited 140   POTASSIUM 3.4 3.4 3.7  Canceled, Test credited 3.9   CHLORIDE 100 100 101  Canceled, Test credited  104   CO2 30 28 27  Canceled, Test credited 25   BUN 7 6* 6*  Canceled, Test credited 14   CR 0.85 0.82 0.80  Canceled, Test credited 0.80   ANIONGAP 6 7 8  Canceled, Test credited 11   BRIAN 8.6 8.7 8.3*  Canceled, Test credited 8.1*   * 138* 131*  Canceled, Test credited 141*   ALBUMIN 2.8* 3.0* 3.2* 3.4   PROTTOTAL 7.0 7.0 6.8 6.5*   BILITOTAL 1.2 2.1* 1.9* 1.0   ALKPHOS 58 57 56 65   ALT 45 42 57 79*   AST 24 21 33 42   LIPASE  --  99  --  345       Recent Labs   Lab 03/23/19  1307 03/23/19  0945 03/23/19  0741 03/23/19  0631 03/23/19  0541 03/23/19  0538 03/23/19  0414  03/22/19  0456  03/21/19  0451  03/20/19  0506 03/19/19  2148   GLC  --   --   --   --  119*  --   --   --  138*  --  131*  Canceled, Test credited  --  141* 119*   * 113* 140* 117*  --  117* 123*   < >  --    < >  --    < >  --   --     < > = values in this interval not displayed.        Unresulted Labs Ordered in the Past 30 Days of this Admission     Date and Time Order Name Status Description    3/21/2019 2037 Blood culture Preliminary     3/21/2019 2037 Blood culture Preliminary            Imaging:   No results found for this or any previous visit (from the past 24 hour(s)).     I reviewed all new labs and imaging results over the last 24 hours. I personally reviewed the abdominal CT image(s) showing extensive inflammatory changes from pancreatic head down r pericolic gutter, no free air. .    Medications     dextrose 5% lactated ringers 125 mL/hr at 03/23/19 1108     insulin (regular) 3 Units/hr (03/23/19 0855)       pantoprazole (PROTONIX) IV  40 mg Intravenous Q12H     piperacillin-tazobactam  4.5 g Intravenous Q6H   Reviewed meds

## 2019-03-24 LAB
ANION GAP SERPL CALCULATED.3IONS-SCNC: 6 MMOL/L (ref 3–14)
BUN SERPL-MCNC: 7 MG/DL (ref 7–30)
CALCIUM SERPL-MCNC: 8.6 MG/DL (ref 8.5–10.1)
CHLORIDE SERPL-SCNC: 102 MMOL/L (ref 94–109)
CO2 SERPL-SCNC: 29 MMOL/L (ref 20–32)
CREAT SERPL-MCNC: 0.86 MG/DL (ref 0.66–1.25)
ERYTHROCYTE [DISTWIDTH] IN BLOOD BY AUTOMATED COUNT: 13.1 % (ref 10–15)
GFR SERPL CREATININE-BSD FRML MDRD: >90 ML/MIN/{1.73_M2}
GLUCOSE BLDC GLUCOMTR-MCNC: 103 MG/DL (ref 70–99)
GLUCOSE BLDC GLUCOMTR-MCNC: 113 MG/DL (ref 70–99)
GLUCOSE SERPL-MCNC: 111 MG/DL (ref 70–99)
HCT VFR BLD AUTO: 37 % (ref 40–53)
HGB BLD-MCNC: 12.2 G/DL (ref 13.3–17.7)
LACTATE BLD-SCNC: 0.7 MMOL/L (ref 0.7–2)
LIPASE SERPL-CCNC: 106 U/L (ref 73–393)
MCH RBC QN AUTO: 28.8 PG (ref 26.5–33)
MCHC RBC AUTO-ENTMCNC: 33 G/DL (ref 31.5–36.5)
MCV RBC AUTO: 88 FL (ref 78–100)
PLATELET # BLD AUTO: 225 10E9/L (ref 150–450)
POTASSIUM SERPL-SCNC: 3.2 MMOL/L (ref 3.4–5.3)
POTASSIUM SERPL-SCNC: 3.4 MMOL/L (ref 3.4–5.3)
RBC # BLD AUTO: 4.23 10E12/L (ref 4.4–5.9)
SODIUM SERPL-SCNC: 137 MMOL/L (ref 133–144)
TRIGL SERPL-MCNC: 430 MG/DL
TRIGL SERPL-MCNC: 466 MG/DL
WBC # BLD AUTO: 12 10E9/L (ref 4–11)

## 2019-03-24 PROCEDURE — 25800030 ZZH RX IP 258 OP 636: Performed by: INTERNAL MEDICINE

## 2019-03-24 PROCEDURE — 25000132 ZZH RX MED GY IP 250 OP 250 PS 637: Performed by: PHYSICIAN ASSISTANT

## 2019-03-24 PROCEDURE — 80048 BASIC METABOLIC PNL TOTAL CA: CPT | Performed by: PHYSICIAN ASSISTANT

## 2019-03-24 PROCEDURE — 25000128 H RX IP 250 OP 636: Performed by: PHYSICIAN ASSISTANT

## 2019-03-24 PROCEDURE — 36415 COLL VENOUS BLD VENIPUNCTURE: CPT | Performed by: PHYSICIAN ASSISTANT

## 2019-03-24 PROCEDURE — 85027 COMPLETE CBC AUTOMATED: CPT | Performed by: PHYSICIAN ASSISTANT

## 2019-03-24 PROCEDURE — 84132 ASSAY OF SERUM POTASSIUM: CPT | Performed by: INTERNAL MEDICINE

## 2019-03-24 PROCEDURE — 25000128 H RX IP 250 OP 636: Performed by: INTERNAL MEDICINE

## 2019-03-24 PROCEDURE — 00000146 ZZHCL STATISTIC GLUCOSE BY METER IP

## 2019-03-24 PROCEDURE — 12000011 ZZH R&B MS OVERFLOW

## 2019-03-24 PROCEDURE — 25000131 ZZH RX MED GY IP 250 OP 636 PS 637: Performed by: INTERNAL MEDICINE

## 2019-03-24 PROCEDURE — 99233 SBSQ HOSP IP/OBS HIGH 50: CPT | Performed by: INTERNAL MEDICINE

## 2019-03-24 PROCEDURE — 83605 ASSAY OF LACTIC ACID: CPT | Performed by: INTERNAL MEDICINE

## 2019-03-24 PROCEDURE — 83690 ASSAY OF LIPASE: CPT | Performed by: PHYSICIAN ASSISTANT

## 2019-03-24 PROCEDURE — 25000132 ZZH RX MED GY IP 250 OP 250 PS 637: Performed by: INTERNAL MEDICINE

## 2019-03-24 PROCEDURE — C9113 INJ PANTOPRAZOLE SODIUM, VIA: HCPCS | Performed by: PHYSICIAN ASSISTANT

## 2019-03-24 PROCEDURE — 84478 ASSAY OF TRIGLYCERIDES: CPT | Performed by: PHYSICIAN ASSISTANT

## 2019-03-24 PROCEDURE — 36415 COLL VENOUS BLD VENIPUNCTURE: CPT | Performed by: INTERNAL MEDICINE

## 2019-03-24 PROCEDURE — 84478 ASSAY OF TRIGLYCERIDES: CPT | Performed by: INTERNAL MEDICINE

## 2019-03-24 RX ORDER — POTASSIUM CL/LIDO/0.9 % NACL 10MEQ/0.1L
10 INTRAVENOUS SOLUTION, PIGGYBACK (ML) INTRAVENOUS
Status: DISCONTINUED | OUTPATIENT
Start: 2019-03-24 | End: 2019-03-25 | Stop reason: HOSPADM

## 2019-03-24 RX ORDER — LIDOCAINE 4 G/G
2 PATCH TOPICAL
Status: DISCONTINUED | OUTPATIENT
Start: 2019-03-24 | End: 2019-03-25 | Stop reason: HOSPADM

## 2019-03-24 RX ORDER — POTASSIUM CHLORIDE 1500 MG/1
20-40 TABLET, EXTENDED RELEASE ORAL
Status: DISCONTINUED | OUTPATIENT
Start: 2019-03-24 | End: 2019-03-25 | Stop reason: HOSPADM

## 2019-03-24 RX ORDER — POTASSIUM CHLORIDE 7.45 MG/ML
10 INJECTION INTRAVENOUS
Status: DISCONTINUED | OUTPATIENT
Start: 2019-03-24 | End: 2019-03-25 | Stop reason: HOSPADM

## 2019-03-24 RX ORDER — POTASSIUM CHLORIDE 1.5 G/1.58G
20-40 POWDER, FOR SOLUTION ORAL
Status: DISCONTINUED | OUTPATIENT
Start: 2019-03-24 | End: 2019-03-25 | Stop reason: HOSPADM

## 2019-03-24 RX ORDER — POTASSIUM CHLORIDE 29.8 MG/ML
20 INJECTION INTRAVENOUS
Status: DISCONTINUED | OUTPATIENT
Start: 2019-03-24 | End: 2019-03-24 | Stop reason: ALTCHOICE

## 2019-03-24 RX ADMIN — SODIUM CHLORIDE, SODIUM LACTATE, POTASSIUM CHLORIDE, CALCIUM CHLORIDE AND DEXTROSE MONOHYDRATE: 5; 600; 310; 30; 20 INJECTION, SOLUTION INTRAVENOUS at 03:20

## 2019-03-24 RX ADMIN — TAZOBACTAM SODIUM AND PIPERACILLIN SODIUM 4.5 G: 500; 4 INJECTION, SOLUTION INTRAVENOUS at 05:13

## 2019-03-24 RX ADMIN — HYDROMORPHONE HYDROCHLORIDE 2 MG: 2 TABLET ORAL at 00:33

## 2019-03-24 RX ADMIN — ACETAMINOPHEN 650 MG: 325 TABLET, FILM COATED ORAL at 08:35

## 2019-03-24 RX ADMIN — PANTOPRAZOLE SODIUM 40 MG: 40 INJECTION, POWDER, FOR SOLUTION INTRAVENOUS at 10:02

## 2019-03-24 RX ADMIN — POTASSIUM CHLORIDE 20 MEQ: 1500 TABLET, EXTENDED RELEASE ORAL at 15:07

## 2019-03-24 RX ADMIN — HYDROMORPHONE HYDROCHLORIDE 2 MG: 2 TABLET ORAL at 08:36

## 2019-03-24 RX ADMIN — PANTOPRAZOLE SODIUM 40 MG: 40 INJECTION, POWDER, FOR SOLUTION INTRAVENOUS at 22:52

## 2019-03-24 RX ADMIN — HYDROMORPHONE HYDROCHLORIDE 2 MG: 2 TABLET ORAL at 12:30

## 2019-03-24 RX ADMIN — Medication 1 MG: at 00:33

## 2019-03-24 RX ADMIN — HYDROMORPHONE HYDROCHLORIDE 2 MG: 2 TABLET ORAL at 16:24

## 2019-03-24 RX ADMIN — ACETAMINOPHEN 650 MG: 325 TABLET, FILM COATED ORAL at 16:24

## 2019-03-24 RX ADMIN — TAZOBACTAM SODIUM AND PIPERACILLIN SODIUM 4.5 G: 500; 4 INJECTION, SOLUTION INTRAVENOUS at 12:29

## 2019-03-24 RX ADMIN — TAZOBACTAM SODIUM AND PIPERACILLIN SODIUM 4.5 G: 500; 4 INJECTION, SOLUTION INTRAVENOUS at 18:31

## 2019-03-24 RX ADMIN — TAZOBACTAM SODIUM AND PIPERACILLIN SODIUM 4.5 G: 500; 4 INJECTION, SOLUTION INTRAVENOUS at 23:39

## 2019-03-24 RX ADMIN — ACETAMINOPHEN 650 MG: 325 TABLET, FILM COATED ORAL at 04:41

## 2019-03-24 RX ADMIN — POTASSIUM CHLORIDE 40 MEQ: 1500 TABLET, EXTENDED RELEASE ORAL at 12:30

## 2019-03-24 RX ADMIN — ACETAMINOPHEN 650 MG: 325 TABLET, FILM COATED ORAL at 20:30

## 2019-03-24 RX ADMIN — HYDROMORPHONE HYDROCHLORIDE 2 MG: 2 TABLET ORAL at 04:41

## 2019-03-24 RX ADMIN — TAZOBACTAM SODIUM AND PIPERACILLIN SODIUM 4.5 G: 500; 4 INJECTION, SOLUTION INTRAVENOUS at 00:40

## 2019-03-24 RX ADMIN — HYDROMORPHONE HYDROCHLORIDE 2 MG: 2 TABLET ORAL at 20:30

## 2019-03-24 RX ADMIN — ACETAMINOPHEN 650 MG: 325 TABLET, FILM COATED ORAL at 00:38

## 2019-03-24 RX ADMIN — SODIUM CHLORIDE 3 UNITS/HR: 9 INJECTION, SOLUTION INTRAVENOUS at 03:20

## 2019-03-24 RX ADMIN — ACETAMINOPHEN 650 MG: 325 TABLET, FILM COATED ORAL at 12:30

## 2019-03-24 ASSESSMENT — ACTIVITIES OF DAILY LIVING (ADL)
ADLS_ACUITY_SCORE: 12

## 2019-03-24 ASSESSMENT — PAIN DESCRIPTION - DESCRIPTORS
DESCRIPTORS: INTERMITTENT;SHARP
DESCRIPTORS: INTERMITTENT;SHARP;SPASM
DESCRIPTORS: INTERMITTENT;SHARP
DESCRIPTORS: INTERMITTENT;SHARP;SPASM

## 2019-03-24 ASSESSMENT — MIFFLIN-ST. JEOR: SCORE: 2021.25

## 2019-03-24 NOTE — PLAN OF CARE
"Patient has been in better spirits today since IVF and insulin drip discontinued. Patient has been walking in the halls frequently. Has drank 2 boxes of boost today, tolerated 1/2 piece of toast and jello for breakfast and 1/4 piece of bread (Croatian dip) and half of the meat on the sandwich, denies increase in abdominal discomfort or nausea after eating. Patient stating \"oral dilaudid managing his abdominal discomfort\".  Patient did get wheeled to the front doors and was able to sit outside for a few minutes.     "

## 2019-03-24 NOTE — PLAN OF CARE
Pt's abdominal pain is being fairly well controlled with po Dilaudid.  Pt did have one episode after he had been up to the bathroom twice with in a 30 minute span and had two large BM's thus causing some increase in his pain, he was given IV Dilaudid at that time.  Pt also tends to run a low grade fever towards the end of the day see charting of VS, pt has had Tylenol every 4 hr's to help bring down his fever as well as help with his pain.  Blood sugars have been 115, 103, and 113, the Insulin drip remains at 3 units/hr, pt has had 2 containers of Breeze to keep his sugars up.  Will continue to monitor close for changes.

## 2019-03-24 NOTE — PROGRESS NOTES
[facility] Medicine Progress Note  Date of Service: 03/24/2019    Assessment & Plan   Booker Quintana is a 33 year old male admitted on 3/19/2019. He presents with abdominal pain.    Abd/pelvis CT - no contrast - Stone Protocol    Impression    IMPRESSION:  1. Inflammation surrounding the head of the pancreas likely related to  pancreatitis. Its possible duodenitis or duodenal ulcer could  contribute to this appearance but most likely related to pancreatitis.  2. Prominent fatty infiltration of the liver.  3. Mildly prominent spleen.    PORFIRIO SAMUEL MD       Acute pancreatitis due to hypertriglyceridemia    Acute onset  Pain in  the morning of admission. Occasional ETOH use (3-5 drinks/month). CT as above.. Lipase mildly elevated 526. Started on aggressive IV fluids on admission. Triglycerides returned elevated at 4463. Abdominal pain extended along right colic gutter and developed worsening fevers 3/21. Empiric Zosyn started. Repeat CT  3/21 abdomen/pelvis shows extension of inflammatory process, no appendicitis or free air.   Pain and temp curve better. WBC still elevated and still spiking LGT.    - continue empiric Zosyn for now. ADAT-ambulate    Hypertriglyceridemia    4463 day after admission. He does have some diabetes and elevated cholesterol his family but no known specific diagnosis of hypertriglyceridemia.  No xanthomas noted on exam.  Denies excessive alcohol use.  Not on any medications known to cause hypertriglyceridemia.  A1c 5.7%.  Was Started on insulin infusion and D5 to keep glucose up.  triglycerides have come down to <500 today.   -stop fluids/ insulin. Needs OP f/u after diet exercise and fibrate rx if still elevated.    Chronic, severe GERD    Daily heartburn for 10-15 years. Takes daily omeprazole and ranitidine along with frequent Tums. Worse at night, frequently wakes up vomiting. CT did show some findings concerning for duodenitis or possible  duodenal ulcer.     Reviewed CT with radiologist, there is duodenitis but no evidence of free air to suggest perforation. Liver with fatty infiltration, GB appears normal as do the ducts. Reflux symptoms better on IV PPI.    -change to po pantoprazole 40 mg BID   - consider  EGD as outpatient         DVT Prophylaxis: Low Risk/Ambulatory with no VTE prophylaxis indicated  Bishop Catheter: not present  Code Status: Full    Disposition: Anticipate discharge 2-3 days         Interval History   Continues to have pain and is still spiking temps. Wants to try some po full diet today.     Physical Exam   Temp:  [99.2  F (37.3  C)-102.1  F (38.9  C)] 99.2  F (37.3  C)  Pulse:  [] 75  Heart Rate:  [] 73  Resp:  [12-28] 13  BP: (131-161)/() 131/84  SpO2:  [93 %-99 %] 96 %    Weights:   Vitals:    03/21/19 0513 03/23/19 0600 03/24/19 0500   Weight: 109 kg (240 lb 4.8 oz) 107.3 kg (236 lb 8.9 oz) 106.5 kg (234 lb 12.6 oz)    Body mass index is 33.69 kg/m .    Constitutional: alert and oriented, appears more comfortable  CV: Regular, no edema  Respiratory: decreased breath ssounds in bases with a few bibasilar crackles otherwise CTA bilaterally  GI: mild-moderate distension, mild-mod  tenderness to  palpation epigastrium, right upper quadrant, right lower quadrant   Skin: Warm and dry    Data   Recent Labs   Lab 03/24/19  0512 03/23/19  0541 03/22/19  0456   WBC 12.0* 12.7* 13.6*   HGB 12.2* 12.9* 13.8   MCV 88 88 86    187 187    136 135   POTASSIUM 3.2* 3.4 3.4   CHLORIDE 102 100 100   CO2 29 30 28   BUN 7 7 6*   CR 0.86 0.85 0.82   ANIONGAP 6 6 7   BRIAN 8.6 8.6 8.7   * 119* 138*   ALBUMIN  --  2.8* 3.0*   PROTTOTAL  --  7.0 7.0   BILITOTAL  --  1.2 2.1*   ALKPHOS  --  58 57   ALT  --  45 42   AST  --  24 21   LIPASE 106  --  99       Recent Labs   Lab 03/24/19  0512 03/24/19  0455 03/24/19  0055 03/23/19  2054 03/23/19  1551 03/23/19  1307 03/23/19  0945  03/23/19  0541  03/22/19  0456   03/21/19  0451  03/20/19  0506 03/19/19  2148   *  --   --   --   --   --   --   --  119*  --  138*  --  131*  Canceled, Test credited  --  141* 119*   BGM  --  113* 103* 115* 100* 135* 113*   < >  --    < >  --    < >  --    < >  --   --     < > = values in this interval not displayed.        Unresulted Labs Ordered in the Past 30 Days of this Admission     Date and Time Order Name Status Description    3/21/2019 2037 Blood culture Preliminary     3/21/2019 2037 Blood culture Preliminary            Imaging:   No results found for this or any previous visit (from the past 24 hour(s)).     I reviewed all new labs and imaging results over the last 24 hours. I personally reviewed the abdominal CT image(s) showing extensive inflammatory changes from pancreatic head down r pericolic gutter, no free air. .    Medications       pantoprazole (PROTONIX) IV  40 mg Intravenous Q12H     piperacillin-tazobactam  4.5 g Intravenous Q6H   Reviewed meds

## 2019-03-25 VITALS
HEART RATE: 90 BPM | TEMPERATURE: 99 F | SYSTOLIC BLOOD PRESSURE: 124 MMHG | RESPIRATION RATE: 12 BRPM | BODY MASS INDEX: 33.61 KG/M2 | DIASTOLIC BLOOD PRESSURE: 84 MMHG | WEIGHT: 234.79 LBS | OXYGEN SATURATION: 96 % | HEIGHT: 70 IN

## 2019-03-25 LAB
POTASSIUM SERPL-SCNC: 3.6 MMOL/L (ref 3.4–5.3)
TRIGL SERPL-MCNC: 337 MG/DL

## 2019-03-25 PROCEDURE — 99239 HOSP IP/OBS DSCHRG MGMT >30: CPT | Performed by: INTERNAL MEDICINE

## 2019-03-25 PROCEDURE — 25000132 ZZH RX MED GY IP 250 OP 250 PS 637: Performed by: PHYSICIAN ASSISTANT

## 2019-03-25 PROCEDURE — 25000128 H RX IP 250 OP 636: Performed by: INTERNAL MEDICINE

## 2019-03-25 PROCEDURE — 84478 ASSAY OF TRIGLYCERIDES: CPT | Performed by: INTERNAL MEDICINE

## 2019-03-25 PROCEDURE — 36415 COLL VENOUS BLD VENIPUNCTURE: CPT | Performed by: INTERNAL MEDICINE

## 2019-03-25 PROCEDURE — 25000128 H RX IP 250 OP 636: Performed by: PHYSICIAN ASSISTANT

## 2019-03-25 PROCEDURE — 25000132 ZZH RX MED GY IP 250 OP 250 PS 637: Performed by: INTERNAL MEDICINE

## 2019-03-25 PROCEDURE — 84132 ASSAY OF SERUM POTASSIUM: CPT | Performed by: INTERNAL MEDICINE

## 2019-03-25 RX ORDER — GEMFIBROZIL 600 MG/1
600 TABLET, FILM COATED ORAL
Qty: 60 TABLET | Refills: 1 | Status: SHIPPED | OUTPATIENT
Start: 2019-03-25 | End: 2019-04-10

## 2019-03-25 RX ORDER — PANTOPRAZOLE SODIUM 40 MG/1
40 TABLET, DELAYED RELEASE ORAL DAILY
Qty: 30 TABLET | Refills: 1 | Status: SHIPPED | OUTPATIENT
Start: 2019-03-25 | End: 2019-04-10

## 2019-03-25 RX ORDER — HYDROMORPHONE HYDROCHLORIDE 2 MG/1
2 TABLET ORAL EVERY 6 HOURS PRN
Qty: 20 TABLET | Refills: 0 | Status: SHIPPED | OUTPATIENT
Start: 2019-03-25 | End: 2019-03-28

## 2019-03-25 RX ADMIN — Medication 1 MG: at 00:36

## 2019-03-25 RX ADMIN — HYDROMORPHONE HYDROCHLORIDE 2 MG: 2 TABLET ORAL at 08:39

## 2019-03-25 RX ADMIN — HYDROMORPHONE HYDROCHLORIDE 2 MG: 2 TABLET ORAL at 00:36

## 2019-03-25 RX ADMIN — ACETAMINOPHEN 650 MG: 325 TABLET, FILM COATED ORAL at 05:18

## 2019-03-25 RX ADMIN — ACETAMINOPHEN 650 MG: 325 TABLET, FILM COATED ORAL at 00:36

## 2019-03-25 RX ADMIN — TAZOBACTAM SODIUM AND PIPERACILLIN SODIUM 4.5 G: 500; 4 INJECTION, SOLUTION INTRAVENOUS at 05:17

## 2019-03-25 RX ADMIN — HYDROMORPHONE HYDROCHLORIDE 2 MG: 2 TABLET ORAL at 05:18

## 2019-03-25 RX ADMIN — HYDROMORPHONE HYDROCHLORIDE 0.5 MG: 1 INJECTION, SOLUTION INTRAMUSCULAR; INTRAVENOUS; SUBCUTANEOUS at 03:08

## 2019-03-25 ASSESSMENT — ACTIVITIES OF DAILY LIVING (ADL)
ADLS_ACUITY_SCORE: 10
ADLS_ACUITY_SCORE: 12
ADLS_ACUITY_SCORE: 10
ADLS_ACUITY_SCORE: 10

## 2019-03-25 ASSESSMENT — PAIN DESCRIPTION - DESCRIPTORS: DESCRIPTORS: SHARP;SHOOTING

## 2019-03-25 NOTE — PROGRESS NOTES
[facility] DISCHARGE NOTE    Patient discharged to home at 12:21 PM via wheel chair. Accompanied by significant other and mother and staff. Discharge instructions reviewed with patient and spouse, opportunity offered to ask questions. Prescriptions sent to patients preferred pharmacy. All belongings sent with patient.    Alena Clark

## 2019-03-25 NOTE — DISCHARGE SUMMARY
[facility] Hospitalist Discharge Summary    Booker Quintana MRN# [id number]   Age: 33 year old YOB: 1985     Date of Admission:  3/19/2019  Date of Discharge::  3/25/2019  Admitting Physician:  Vasyl Nixon MD  Discharge Physician:  Maylin Garay MD  Primary Physician: No Ref-Primary, Physician  Transferring Facility: N/A     Home clinic: none          Admission Diagnoses:   Acute pancreatitis, unspecified complication status, unspecified pancreatitis type [K85.90]  Other acute pancreatitis without infection or necrosis [K85.80]          Discharge Diagnosis:   Principle diagnosis: Acute pancreatitis due to hypertriglyceridemia    Secondary diagnoses:  Principal Problem:    Acute pancreatitis due to hypertriglyceridemia  Active Problems:    Chronic GERD    Hypertriglyceridemia         Brief History of Presenting Illness:   As per admit hx  Booker Quintana is a 33 year old male with no significant medical history other than GERD who presents with acute onset of abdominal pain.  Woke up with mild low back pain yesterday morning, initially thought it was pain from moving couch as the day before.  After lunch the pain migrated around to his mid abdomen and from there extended to the right lower quadrant and epigastrium.  Pain continued to worsen throughout the day.  Denies fever, chills, nausea, vomiting, black/bloody stools.  Denies any similar previous abdominal pain.  Denies history of pancreatitis.  Acknowledges occasional alcohol use but states it is no more than 3-5 drinks per month.  This is confirmed by his family.  He did have two double rum and Cokes the day before admission.     He also acknowledges a long-standing history of severe heartburn for which he takes daily OTC omeprazole, ranitidine, and Tums.  He has never been worked up for this.     Denies chest pain, palpitations, dyspnea, cough, fevers, chills.    No results found for this or any previous visit (from the past 24  hour(s)).         Hospital Course:   Acute pancreatitis due to hypertriglyceridemia    Acute onset  Pain in  the morning of admission. Occasional ETOH use (3-5 drinks/month). CT as above.. Lipase mildly elevated 526. Started on aggressive IV fluids on admission. Triglycerides returned elevated at 4463. Abdominal pain extended along right colic gutter and developed worsening fevers 3/21. Empiric Zosyn started. Repeat CT  3/21 abdomen/pelvis shows extension of inflammatory process, no appendicitis or free air. doing a lot better since yesterday 3/24- WBC was still mildly elevated. tmax 101 last evening but 100 this AM. Eating and tolerating a regular diet now. Temp likely due to inflamation than infection.  Will not discharge on antbx.     Hypertriglyceridemia    4463 day after admission. He does have some diabetes and elevated cholesterol his family but no known specific diagnosis of hypertriglyceridemia.  No xanthomas noted on exam.  Denies excessive alcohol use.  Not on any medications known to cause hypertriglyceridemia.  A1c 5.7%.  Was Started on insulin infusion and D5 to keep glucose up.  triglycerides have come down to <500 3/24 hence stopped fluids/ insulin.   Will start lopid and f/u OP.       Chronic, severe GERD    Daily heartburn for 10-15 years. Takes daily omeprazole and ranitidine along with frequent Tums. Worse at night, frequently wakes up vomiting. CT did show some findings concerning for duodenitis or possible duodenal ulcer.     Reviewed CT with radiologist, there is duodenitis but no evidence of free air to suggest perforation. Liver with fatty infiltration, GB appears normal as do the ducts. Reflux symptoms better on IV PPI. Was rx with protonix in hospital.  Will discharge on protonix daily with OP f/u. Could stop in future after diet and lifestyle changes but if sx recur, needs EGD.            Procedures:   No procedures performed during this admission         Allergies:    No Known Allergies  "         Medications Prior to Admission:     Medications Prior to Admission   Medication Sig Dispense Refill Last Dose     [DISCONTINUED] ibuprofen (ADVIL/MOTRIN) 200 MG tablet Take 400-600 mg by mouth daily as needed for mild pain   3/19/2019 at 08     [DISCONTINUED] omeprazole (PRILOSEC) 20 MG DR capsule Take 20 mg by mouth 2 times daily    3/19/2019 at 08     [DISCONTINUED] ranitidine (ZANTAC) 75 MG tablet Take 75 mg by mouth daily   3/19/2019 at 08             Discharge Medications:     Current Discharge Medication List      START taking these medications    Details   HYDROmorphone (DILAUDID) 2 MG tablet Take 1 tablet (2 mg) by mouth every 6 hours as needed for pain  Qty: 20 tablet, Refills: 0    Associated Diagnoses: Acute biliary pancreatitis, unspecified complication status      pantoprazole (PROTONIX) 40 MG EC tablet Take 1 tablet (40 mg) by mouth daily  Qty: 30 tablet, Refills: 1    Associated Diagnoses: Acute biliary pancreatitis, unspecified complication status         STOP taking these medications       ibuprofen (ADVIL/MOTRIN) 200 MG tablet Comments:   Reason for Stopping:         omeprazole (PRILOSEC) 20 MG DR capsule Comments:   Reason for Stopping:         ranitidine (ZANTAC) 75 MG tablet Comments:   Reason for Stopping:                     Consultations:   No consultations were requested during this admission            Discharge Exam:   Blood pressure 124/84, pulse 90, temperature 99  F (37.2  C), temperature source Oral, resp. rate 16, height 1.778 m (5' 10\"), weight 106.5 kg (234 lb 12.6 oz), SpO2 96 %.  GENERAL APPEARANCE: healthy, alert and no distress  EYES: conjunctiva clear, eyes grossly normal  HENT: external ears and nose normal   NECK: supple, no masses or adenopathy  RESP: lungs clear to auscultation - no rales, rhonchi or wheezes  CV: regular rate and rhythm, normal S1 S2, no S3 or S4 and no murmur, click or rub   ABDOMEN: soft, nontender, no HSM or masses and bowel sounds normal  MS: " no clubbing, cyanosis; no edema  SKIN: clear without significant rashes or lesions  NEURO: Normal strength and tone, sensory exam grossly normal, mentation intact and speech normal    Unresulted Labs Ordered in the Past 30 Days of this Admission     Date and Time Order Name Status Description    3/21/2019 2037 Blood culture Preliminary     3/21/2019 2037 Blood culture Preliminary           No results found for this or any previous visit (from the past 24 hour(s)).         Pending Tests at Discharge:   None         Discharge Instructions and Follow-Up:   Discharge diet: Regular   Discharge activity: Activity as tolerated   Discharge follow-up: Follow up with primary care provider in 1-2  weeks           Discharge Disposition:   Discharged to home      Attestation:  I have reviewed today's vital signs, notes, medications, labs and imaging.    Time Spent on this Encounter   I, Maylin Garay, personally saw the patient today and spent greater than 30 minutes discharging this patient.    Maylin Garay MD

## 2019-03-25 NOTE — PHARMACY - DISCHARGE MEDICATION RECONCILIATION
Discharge medication review for this patient is complete. Pharmacist assisted with medication reconciliation of discharge medications with prior to admission medications.     The following changes were made to the discharge medication list based on pharmacist review:  Added:  Dilaudid and pantoprazole  Discontinued: ibuprofen, ranitidine and omeprazole  Changed: NA      Patient's Discharge Medication List  - medications as listed on After Visit Summary (AVS)     Review of your medicines      START taking      Dose / Directions   HYDROmorphone 2 MG tablet  Commonly known as:  DILAUDID      Dose:  2 mg  Take 1 tablet (2 mg) by mouth every 6 hours as needed for pain  Quantity:  20 tablet  Refills:  0     pantoprazole 40 MG EC tablet  Commonly known as:  PROTONIX      Dose:  40 mg  Take 1 tablet (40 mg) by mouth daily  Quantity:  30 tablet  Refills:  1        STOP taking    ibuprofen 200 MG tablet  Commonly known as:  ADVIL/MOTRIN        omeprazole 20 MG DR capsule  Commonly known as:  priLOSEC        ranitidine 75 MG tablet  Commonly known as:  ZANTAC              Where to get your medicines      These medications were sent to [facility] [place], [place] - [address]  [address] [address]    Phone:  [phone]     pantoprazole 40 MG EC tablet     Some of these will need a paper prescription and others can be bought over the counter. Ask your nurse if you have questions.    Bring a paper prescription for each of these medications    HYDROmorphone 2 MG tablet

## 2019-03-25 NOTE — PROGRESS NOTES
Nutrition note    Patient requested information on diet for pancreatitis. Reviewed diet for pancreatitis (low fat) from the nutrition care manual with the patient and his significant other. He reports that he was eating some high fat meals. He plans to make changes to his eating habits.The patient reports that he was drinking 2 cans of regular pop a day, he agrees to switch to diet pop or crystal light. The patient agrees to limit added sugars. He plans to schedule an appointment with the out patient dietitian to review his food records and for assistance in continuing to improve his diet.    Olive Guerrier RD,LD  Clinical Dietitian

## 2019-03-25 NOTE — PLAN OF CARE
Pt continue to need po Dilaudid every 4 hrs, and at least once he c/o sharp pain that is relieved with IV Dilaudid.  Pt does have chronic back pain and he is now trying lidoderm patches on his low back.  Pt has been up multiple times walking the bullock.  Will continue to monitor close for changes.

## 2019-03-27 ENCOUNTER — MYC MEDICAL ADVICE (OUTPATIENT)
Dept: EDUCATION SERVICES | Facility: CLINIC | Age: 34
End: 2019-03-27

## 2019-03-28 ENCOUNTER — OFFICE VISIT (OUTPATIENT)
Dept: FAMILY MEDICINE | Facility: CLINIC | Age: 34
End: 2019-03-28

## 2019-03-28 VITALS
BODY MASS INDEX: 32.99 KG/M2 | OXYGEN SATURATION: 96 % | TEMPERATURE: 98 F | DIASTOLIC BLOOD PRESSURE: 82 MMHG | HEIGHT: 70 IN | WEIGHT: 230.4 LBS | SYSTOLIC BLOOD PRESSURE: 121 MMHG | HEART RATE: 79 BPM

## 2019-03-28 DIAGNOSIS — E78.3 HYPERTRIGLYCERIDEMIA, SPORADIC: ICD-10-CM

## 2019-03-28 DIAGNOSIS — K85.80 OTHER ACUTE PANCREATITIS, UNSPECIFIED COMPLICATION STATUS: Primary | ICD-10-CM

## 2019-03-28 LAB
BACTERIA SPEC CULT: NO GROWTH
BACTERIA SPEC CULT: NO GROWTH
ERYTHROCYTE [DISTWIDTH] IN BLOOD BY AUTOMATED COUNT: 13.1 % (ref 10–15)
HBA1C MFR BLD: 5.6 % (ref 0–5.6)
HCT VFR BLD AUTO: 42.5 % (ref 40–53)
HGB BLD-MCNC: 14.6 G/DL (ref 13.3–17.7)
Lab: NORMAL
Lab: NORMAL
MCH RBC QN AUTO: 28.6 PG (ref 26.5–33)
MCHC RBC AUTO-ENTMCNC: 34.4 G/DL (ref 31.5–36.5)
MCV RBC AUTO: 83 FL (ref 78–100)
PLATELET # BLD AUTO: 384 10E9/L (ref 150–450)
RBC # BLD AUTO: 5.1 10E12/L (ref 4.4–5.9)
SPECIMEN SOURCE: NORMAL
SPECIMEN SOURCE: NORMAL
TRIGL SERPL-MCNC: 301 MG/DL
WBC # BLD AUTO: 11.5 10E9/L (ref 4–11)

## 2019-03-28 PROCEDURE — 83036 HEMOGLOBIN GLYCOSYLATED A1C: CPT | Performed by: NURSE PRACTITIONER

## 2019-03-28 PROCEDURE — 99214 OFFICE O/P EST MOD 30 MIN: CPT | Performed by: NURSE PRACTITIONER

## 2019-03-28 PROCEDURE — 85027 COMPLETE CBC AUTOMATED: CPT | Performed by: NURSE PRACTITIONER

## 2019-03-28 PROCEDURE — 84478 ASSAY OF TRIGLYCERIDES: CPT | Performed by: NURSE PRACTITIONER

## 2019-03-28 PROCEDURE — 36415 COLL VENOUS BLD VENIPUNCTURE: CPT | Performed by: NURSE PRACTITIONER

## 2019-03-28 ASSESSMENT — MIFFLIN-ST. JEOR: SCORE: 2001.34

## 2019-03-28 NOTE — PATIENT INSTRUCTIONS
1.  Schedule CT for re-evaluation of pain by calling [phone]  2.  Labs today.  I will call you with results.  3.  Stick to diet of low fat and low carbohydrates.  4.  Follow-up in clinic as directed.

## 2019-03-28 NOTE — PROGRESS NOTES
SUBJECTIVE:   Booker Quintana is a 33 year old male who presents to clinic today for the following health issues:        Hospital Follow-up Visit:    Hospital/Nursing Home/IP Rehab Facility: [facility]  Date of Admission: 3/19/2019  Date of Discharge: 3/25/2019  Reason(s) for Admission: Acute Pancreatitis            Problems taking medications regularly:  Gemfibrozil cannot remember to take before meals        Medication changes since discharge: None       Problems adhering to non-medication therapy:  None    Summary of hospitalization:  [facility] discharge summary reviewed  Diagnostic Tests/Treatments reviewed.  Follow up needed: continued abdominal pain concerns per patient, triglyceride and CBC recheck, A1c due to diabetes concerns  Other Healthcare Providers Involved in Patient s Care:         None  Update since discharge: improved. Patient does not want to refill pain medication since he does not feel that he needs it and does not like taking it    Post Discharge Medication Reconciliation: discharge medications reconciled, continue medications without change.  Plan of care communicated with patient     Coding guidelines for this visit:  Type of Medical   Decision Making Face-to-Face Visit       within 7 Days of discharge Face-to-Face Visit        within 14 days of discharge   Moderate Complexity [id number]   High Complexity [id number]          Problem list and histories reviewed & adjusted, as indicated.  Additional history: as documented    Patient Active Problem List   Diagnosis     Acute pancreatitis due to hypertriglyceridemia     Chronic GERD     Hypertriglyceridemia     History reviewed. No pertinent surgical history.    Social History     Tobacco Use     Smoking status: Former Smoker     Packs/day: 0.10     Types: Cigarettes     Smokeless tobacco: Never Used   Substance Use Topics     Alcohol use: Yes     Frequency: 2-4 times a month     Family History   Problem Relation Age of  "Onset     Diabetes Maternal Great-Grandmother      Diabetes Maternal Uncle      Diabetes Paternal Grandfather      Hyperlipidemia Paternal Grandfather          Current Outpatient Medications   Medication Sig Dispense Refill     gemfibrozil (LOPID) 600 MG tablet Take 1 tablet (600 mg) by mouth 2 times daily (before meals) 60 tablet 1     HYDROmorphone (DILAUDID) 2 MG tablet Take 1 tablet (2 mg) by mouth every 6 hours as needed for pain 20 tablet 0     melatonin 5 MG CAPS Take 2 capsules by mouth as needed       pantoprazole (PROTONIX) 40 MG EC tablet Take 1 tablet (40 mg) by mouth daily 30 tablet 1     No Known Allergies    Reviewed and updated as needed this visit by clinical staff  Tobacco  Allergies  Meds  Problems  Med Hx  Surg Hx  Fam Hx  Soc Hx        Reviewed and updated as needed this visit by Provider  Tobacco  Allergies  Meds  Problems  Med Hx  Surg Hx  Fam Hx         ROS:  CONSTITUTIONAL: NEGATIVE for fever, chills, change in weight  ENT/MOUTH: NEGATIVE for ear, mouth and throat problems  RESP: NEGATIVE for significant cough or SOB  CV: NEGATIVE for chest pain, palpitations or peripheral edema  GI: POSITIVE for improving heartburn, still has right upper and lower quadrant abdominal pain/tenderness but is somewhat improved  PSYCHIATRIC: NEGATIVE for changes in mood or affect  ROS otherwise negative    OBJECTIVE:     /82   Pulse 79   Temp 98  F (36.7  C) (Tympanic)   Ht 1.778 m (5' 10\")   Wt 104.5 kg (230 lb 6.4 oz)   SpO2 96%   BMI 33.06 kg/m    Body mass index is 33.06 kg/m .  GENERAL: healthy, alert and no distress  HENT: ear canals and TM's normal, nose and mouth without ulcers or lesions  RESP: lungs clear to auscultation - no rales, rhonchi or wheezes  CV: regular rate and rhythm, normal S1 S2, no S3 or S4, no murmur, click or rub, no peripheral edema and peripheral pulses strong  ABDOMEN: tenderness RUQ and RLQ, no organomegaly or masses and bowel sounds normal  MS: no gross " musculoskeletal defects noted, no edema  PSYCH: mentation appears normal, affect normal/bright    Diagnostic Test Results:  Results for orders placed or performed in visit on 03/28/19 (from the past 24 hour(s))   CBC with platelets   Result Value Ref Range    WBC 11.5 (H) 4.0 - 11.0 10e9/L    RBC Count 5.10 4.4 - 5.9 10e12/L    Hemoglobin 14.6 13.3 - 17.7 g/dL    Hematocrit 42.5 40.0 - 53.0 %    MCV 83 78 - 100 fl    MCH 28.6 26.5 - 33.0 pg    MCHC 34.4 31.5 - 36.5 g/dL    RDW 13.1 10.0 - 15.0 %    Platelet Count 384 150 - 450 10e9/L   Hemoglobin A1c   Result Value Ref Range    Hemoglobin A1C 5.6 0 - 5.6 %       ASSESSMENT/PLAN:     1. Other acute pancreatitis, unspecified complication status  Patient is still having significant pain and there was some peritoneal fluid in the abdomen.  I will repeat CT for re-evaluation due to persistent pain.  Information reviewed on diet.  CBC obtained and shows that there is improvement in lab.  Hgb A1c collected since patient is concerned for diabetes and is not fasting and this was normal also.  Patient has follow-up to establish care and I discussed that this will be important for his ongoing health.  - CBC with platelets  - Hemoglobin A1c  - CT Abdomen w/o Contrast; Future    2. Hypertriglyceridemia, sporadic  Patient admits to a poor diet and drinking a lot of pop daily.  He states that his is working on his diet and some weight loss to help reduce this with lifestyle changes.  Ordered repeat triglycerides.  Discussed causes for elevation and treatment in lifestyle changes reviewed.  Recommend at this time to continue Gemfibrozil and follow-up in 2 weeks with establish care visit as planned.  I will call with results.  - Triglycerides    See Patient Instructions    Gege Saldivar NP  [facility]

## 2019-03-28 NOTE — PROGRESS NOTES
"  SUBJECTIVE:   Booker Quintana is a 33 year old male who presents to clinic today for the following health issues:    {Superlists:422072}    {additional problems for provider to add:211608}    Problem list and histories reviewed & adjusted, as indicated.  Additional history: {NONE - AS DOCUMENTED:299207::\"as documented\"}    {HIST REVIEW/ LINKS 2:451571}    Reviewed and updated as needed this visit by clinical staff       Reviewed and updated as needed this visit by Provider         {PROVIDER CHARTING PREFERENCE:285647}  "

## 2019-04-01 ENCOUNTER — HOSPITAL ENCOUNTER (OUTPATIENT)
Dept: CT IMAGING | Facility: CLINIC | Age: 34
Discharge: HOME | End: 2019-04-01
Attending: NURSE PRACTITIONER | Admitting: NURSE PRACTITIONER

## 2019-04-01 DIAGNOSIS — K85.80 OTHER ACUTE PANCREATITIS, UNSPECIFIED COMPLICATION STATUS: ICD-10-CM

## 2019-04-01 PROCEDURE — 25000125 ZZHC RX 250: Performed by: RADIOLOGY

## 2019-04-01 PROCEDURE — 74160 CT ABDOMEN W/CONTRAST: CPT

## 2019-04-01 PROCEDURE — 25000128 H RX IP 250 OP 636: Performed by: RADIOLOGY

## 2019-04-01 RX ORDER — IOPAMIDOL 755 MG/ML
100 INJECTION, SOLUTION INTRAVASCULAR ONCE
Status: COMPLETED | OUTPATIENT
Start: 2019-04-01 | End: 2019-04-01

## 2019-04-01 RX ADMIN — IOPAMIDOL 100 ML: 755 INJECTION, SOLUTION INTRAVENOUS at 12:01

## 2019-04-01 RX ADMIN — SODIUM CHLORIDE 73 ML: 9 INJECTION, SOLUTION INTRAVENOUS at 12:01

## 2019-04-08 NOTE — TELEPHONE ENCOUNTER
----- Message from Gege Saldivar NP sent at 4/1/2019  1:24 PM CDT -----  You will be seeing this patient for establish care.  I saw him last week for hospital follow-up with pancreatitis due to elevated triglycerides >4000.  I repeated his CT due to continuing pain.  I called him today and pain is going down but he does have pseudocysts forming on the pancreas.  Recommendation is to f/u in 3 months to re-evaluate for resolution of these.  I discussed this with the patient and was wondering if you could put this order in when you see him.  Let me know.  Gege Saldivar NP on 4/1/2019 at 1:27 PM

## 2019-04-09 ENCOUNTER — HOSPITAL ENCOUNTER (OUTPATIENT)
Dept: NUTRITION | Facility: CLINIC | Age: 34
Discharge: HOME | End: 2019-04-09
Admitting: NURSE PRACTITIONER

## 2019-04-09 PROCEDURE — 97802 MEDICAL NUTRITION INDIV IN: CPT | Performed by: DIETITIAN, REGISTERED

## 2019-04-10 ENCOUNTER — ANCILLARY PROCEDURE (OUTPATIENT)
Dept: GENERAL RADIOLOGY | Facility: CLINIC | Age: 34
End: 2019-04-10
Attending: NURSE PRACTITIONER

## 2019-04-10 ENCOUNTER — OFFICE VISIT (OUTPATIENT)
Dept: FAMILY MEDICINE | Facility: CLINIC | Age: 34
End: 2019-04-10

## 2019-04-10 VITALS
HEART RATE: 84 BPM | SYSTOLIC BLOOD PRESSURE: 120 MMHG | BODY MASS INDEX: 33.36 KG/M2 | WEIGHT: 233 LBS | TEMPERATURE: 98.7 F | OXYGEN SATURATION: 99 % | HEIGHT: 70 IN | RESPIRATION RATE: 16 BRPM | DIASTOLIC BLOOD PRESSURE: 82 MMHG

## 2019-04-10 DIAGNOSIS — K21.9 GASTROESOPHAGEAL REFLUX DISEASE, ESOPHAGITIS PRESENCE NOT SPECIFIED: ICD-10-CM

## 2019-04-10 DIAGNOSIS — E78.1 HYPERTRIGLYCERIDEMIA: ICD-10-CM

## 2019-04-10 DIAGNOSIS — M25.512 LEFT SHOULDER PAIN, UNSPECIFIED CHRONICITY: ICD-10-CM

## 2019-04-10 DIAGNOSIS — K85.10 ACUTE BILIARY PANCREATITIS, UNSPECIFIED COMPLICATION STATUS: ICD-10-CM

## 2019-04-10 DIAGNOSIS — R53.83 OTHER FATIGUE: ICD-10-CM

## 2019-04-10 DIAGNOSIS — E78.3 HYPERTRIGLYCERIDEMIA, SPORADIC: ICD-10-CM

## 2019-04-10 DIAGNOSIS — M54.41 BILATERAL LOW BACK PAIN WITH RIGHT-SIDED SCIATICA, UNSPECIFIED CHRONICITY: ICD-10-CM

## 2019-04-10 DIAGNOSIS — Z23 NEED FOR VACCINATION: ICD-10-CM

## 2019-04-10 DIAGNOSIS — K85.80 OTHER ACUTE PANCREATITIS, UNSPECIFIED COMPLICATION STATUS: Primary | ICD-10-CM

## 2019-04-10 LAB
BASOPHILS # BLD AUTO: 0.1 10E9/L (ref 0–0.2)
BASOPHILS NFR BLD AUTO: 1 %
CHOLEST SERPL-MCNC: 155 MG/DL
DIFFERENTIAL METHOD BLD: NORMAL
EOSINOPHIL # BLD AUTO: 0.3 10E9/L (ref 0–0.7)
EOSINOPHIL NFR BLD AUTO: 4.7 %
ERYTHROCYTE [DISTWIDTH] IN BLOOD BY AUTOMATED COUNT: 13.2 % (ref 10–15)
HCT VFR BLD AUTO: 42.4 % (ref 40–53)
HDLC SERPL-MCNC: 23 MG/DL
HGB BLD-MCNC: 14.4 G/DL (ref 13.3–17.7)
LDLC SERPL CALC-MCNC: ABNORMAL MG/DL
LYMPHOCYTES # BLD AUTO: 3.1 10E9/L (ref 0.8–5.3)
LYMPHOCYTES NFR BLD AUTO: 45.6 %
MCH RBC QN AUTO: 28.6 PG (ref 26.5–33)
MCHC RBC AUTO-ENTMCNC: 34 G/DL (ref 31.5–36.5)
MCV RBC AUTO: 84 FL (ref 78–100)
MONOCYTES # BLD AUTO: 0.6 10E9/L (ref 0–1.3)
MONOCYTES NFR BLD AUTO: 8.9 %
NEUTROPHILS # BLD AUTO: 2.7 10E9/L (ref 1.6–8.3)
NEUTROPHILS NFR BLD AUTO: 39.8 %
NONHDLC SERPL-MCNC: 132 MG/DL
PLATELET # BLD AUTO: 333 10E9/L (ref 150–450)
RBC # BLD AUTO: 5.04 10E12/L (ref 4.4–5.9)
TRIGL SERPL-MCNC: 476 MG/DL
WBC # BLD AUTO: 6.8 10E9/L (ref 4–11)

## 2019-04-10 PROCEDURE — 73030 X-RAY EXAM OF SHOULDER: CPT | Mod: LT

## 2019-04-10 PROCEDURE — 80061 LIPID PANEL: CPT | Performed by: NURSE PRACTITIONER

## 2019-04-10 PROCEDURE — 99214 OFFICE O/P EST MOD 30 MIN: CPT | Mod: 25 | Performed by: NURSE PRACTITIONER

## 2019-04-10 PROCEDURE — 36415 COLL VENOUS BLD VENIPUNCTURE: CPT | Performed by: NURSE PRACTITIONER

## 2019-04-10 PROCEDURE — 90715 TDAP VACCINE 7 YRS/> IM: CPT | Performed by: NURSE PRACTITIONER

## 2019-04-10 PROCEDURE — 71046 X-RAY EXAM CHEST 2 VIEWS: CPT | Mod: FY

## 2019-04-10 PROCEDURE — 90471 IMMUNIZATION ADMIN: CPT | Performed by: NURSE PRACTITIONER

## 2019-04-10 PROCEDURE — 85025 COMPLETE CBC W/AUTO DIFF WBC: CPT | Performed by: NURSE PRACTITIONER

## 2019-04-10 RX ORDER — PANTOPRAZOLE SODIUM 40 MG/1
40 TABLET, DELAYED RELEASE ORAL
Qty: 180 TABLET | Refills: 1 | Status: SHIPPED | OUTPATIENT
Start: 2019-04-10 | End: 2019-08-08

## 2019-04-10 RX ORDER — CALCIUM CARBONATE 750 MG/1
750 TABLET, CHEWABLE ORAL DAILY PRN
COMMUNITY
End: 2019-04-24

## 2019-04-10 RX ORDER — GEMFIBROZIL 600 MG/1
600 TABLET, FILM COATED ORAL
Qty: 180 TABLET | Refills: 1 | Status: SHIPPED | OUTPATIENT
Start: 2019-04-10 | End: 2019-06-03 | Stop reason: ALTCHOICE

## 2019-04-10 ASSESSMENT — PAIN SCALES - GENERAL: PAINLEVEL: SEVERE PAIN (7)

## 2019-04-10 ASSESSMENT — MIFFLIN-ST. JEOR: SCORE: 2013.13

## 2019-04-10 NOTE — PROGRESS NOTES
"[facility]  Medical Nutrition Therapy    Visit Type: Initial Assessment    Booker Quintana referred by self for MNT related to Hyperlipidemia      Nutrition Assessment:  Anthropometrics  Height: 5'10\"      Weight: 104.5 kg/230 lbs      IBW (kg): 75 kg/166 lbs    BMI:  33.1  (obesity class I)     Nutrition History  -Pt was recently in the hospital for acute pancreatitis r/t hypertriglyceridemia (3/19-3/25); triglycerides were >4,000 upon admission, and were brought down to 301 by 3/28  -Since d/c'ing from hospital, he has made many lifestyle changes including quitting smoking and drinking, stopping soda and fast food intake, and decreasing overall snacking  -Pt reports that he feels hungry often, especially now that he's reduced his oral intake  -Admits to struggling w/control of portion sizes at meals and snacks; snacks in the evening while watching TV    Physical Activity  -Works construction 3 days per week, other days of the week he is sedentary; pt reports in the spring/summer/fall he is more active doing yard maintenance, but winter he doesn't do much for PA (nitin snow for work)       Nutrition Prescription  Energy: 6025-8336 kcal/day  (25-30 kcal/kg ABW) Protein: 66-83 g/day  (.8-1 g/kg ABW)   Fluid: 5990-6292 mL/day  (1 mL/kcal)         Fiber: 38 g/day         Food Record  B: Skips often or has a protein bar  L: Subway foot long sub (chicken or turkey, veggies, vinaigrette dressing on 9 grain wheat), water  D: Fish unbreaded or chicken breast, cauliflower rice, skim milk or water (often has second servings)  Snack: Fiber bar, SF popsicle, reduced fat Cheezits, or Smart popcorn  Fluids: water or Crystal Light throughout the day   -Pt reports that he has significantly reduced his daily kcal intake, as he used to snack throughout the day on candy, candy bars, chips, cookies, and other snack foods, and was drinking regular soda daily  -Current usual daily intake appears adequate in overall kcal " and protein, fruit, and dairy, adequate in veggies and fluids    Nutrition Diagnosis:  PES: Obesity class I r/t sedentary lifestyle/excessive oral intake r/t BMI 33.1, hyperlipidemia, diet recall, and no regular PA     Nutrition Intervention:  -Educated pt on meal planning using MyPlate and the importance of consuming a balanced diet including appropriate servings from all food groups  -Educated pt on label reading and portion sizes for all food groups  -Educated pt on heart healthy nutrition therapy (choosing WGs, fresh fruits & veggies, and lean protein sources)  -Discussed limiting saturated fats and avoiding trans fats (recommended switching to natural peanut butter); suggested eating more plant-based meals  -Educated pt on mono/poly and omega 3 unsaturated fat sources (suggested switching from butter and solid margarine to Smart Balance dairy free butter spread or using an oil spritzer)  -Discussed eating more whole, unprocessed foods to limit sodium intake and limiting refined CHOs especially sugar, sweets, and sugar-sweetened beverages  -Educated pt on fiber and ways to increase it in the diet; discussed the benefits of soluble fiber and went over foods high in fiber (list provided); suggested trying flax seed or shadia seeds  -Discussed recommended and not recommended foods from all food groups (list provided)  -Discussed metabolism and used the fire analogy  -Educated pt on foods for energy and making snacks consist of pro+complex carb  -Educated pt on intuitive eating and using a hunger/fullness scale  -Suggested slow gradual wt loss of 1-2 lbs/week to spare LBM   -Discussed the importance of PA and recommended making it a goal to get 60 min per day of exercise (start w/20 min/day)     Nutrition Goals:  1) Exercise for 30 min per day (try using treadmill- use the talk test for fat burning zone)  2) Have breakfast daily- try WG cereal w/milk and fruit   3) Listen to hunger/fullness cues    Nutrition Follow Up  / Monitoring:  Weight, PO intake of meals and snacks, PA, labs (triglycerides)     Nutrition Recommendations:  Patient to follow-up with RD in 4 weeks.  Patient has RD contact information to call/email if needed.      Start Time: 8:49am  End Time: 9:51am      Amanda Sultana RD, LD  Clinical Dietitian  [facility]: [phone]  [facility]: [phone]

## 2019-04-10 NOTE — PATIENT INSTRUCTIONS
Tetanus booster given today.  Will be notified of pending labs and shoulder and chest x ray.  To PT for left shoulder and low back.  Schedule EGD.  Continue with healthy diet, fluids, no pop, no alcohol and all other medications.  Will repeat abdominal CT in 3 months.      Our Clinic hours are:  Mondays    7:20 am - 7 pm  Tues -  Fri  7:20 am - 5 pm    Clinic Phone: [phone]    The clinic lab opens at 7:30 am Mon - Fri and appointments are required.    [facility]  Ph. [phone]  Monday  8 am - 7pm  Tues - Fri 8 am - 5:30 pm

## 2019-04-10 NOTE — NURSING NOTE
Prior to injection, verified patient identity using patient's name and date of birth.  Due to injection administration, patient instructed to remain in clinic for 15 minutes  afterwards, and to report any adverse reaction to me immediately.      Screening Questionnaire for Adult Immunization    Are you sick today?   No   Do you have allergies to medications, food, a vaccine component or latex?   No   Have you ever had a serious reaction after receiving a vaccination?   No   Do you have a long-term health problem with heart disease, lung disease, asthma, kidney disease, metabolic disease (e.g. diabetes), anemia, or other blood disorder?   No   Do you have cancer, leukemia, HIV/AIDS, or any other immune system problem?   No   In the past 3 months, have you taken medications that affect  your immune system, such as prednisone, other steroids, or anticancer drugs; drugs for the treatment of rheumatoid arthritis, Crohn s disease, or psoriasis; or have you had radiation treatments?   No   Have you had a seizure, or a brain or other nervous system problem?   No   During the past year, have you received a transfusion of blood or blood     products, or been given immune (gamma) globulin or antiviral drug?   No   For women: Are you pregnant or is there a chance you could become        pregnant during the next month?   No   Have you received any vaccinations in the past 4 weeks?   No     Immunization questionnaire answers were all negative.        Per orders of Lenora Cobian, injection of Adacel given by Lauren Bradley. Patient instructed to remain in clinic for 15 minutes afterwards, and to report any adverse reaction to me immediately.       Screening performed by Lauren Bradley on 4/10/2019 at 11:50 AM.

## 2019-04-10 NOTE — PROGRESS NOTES
"  SUBJECTIVE:   Booker Quintana is a 33 year old male who presents to clinic today for the following   health issues:      New Patient/Transfer of Care  He is here for a follow up on the pancreatitis. He also is complaining of left shoulder pain that is keeping him up at night for 2 months. He also has low back pain that started last night.        Additional history: new patient. He states he hasn't been in to clinic for over 10 years, typically doesn't seek medical attention. Developed sudden onset of abdominal pain and was recently seen and treated for acute pancreatitis. He denies heavy alcohol use. States his diet prior to this was \"crap\" all junk food and a lot of pop daily. He has high triglycerides. He has also had a lot of issues with heart burn. At present, his pain has greatly improved but continues with heart burn see most recent CT of abdomen results below.   He also has had low back pain and left shoulder pain. He has changed his diet since this happened and states it was a \"wake up call\"  He has felt fatigued since this happened. No fever, chills, cough, nausea.   He does not smoke. He is self employed, .  He is due for tetanus.                                                                       IMPRESSION:  Inflammation and/or fluid density in the midline and  right paramidline retroperitoneum has significantly improved compared  to the prior study but small crescentic collections with early  enhancing walls are developing. They can relate to early pseudocyst  formation.     MARIA ESTHER BENNETT MD        Reviewed  and updated as needed this visit by clinical staff  Tobacco  Allergies  Meds  Med Hx  Surg Hx  Fam Hx  Soc Hx        Reviewed and updated as needed this visit by Provider         Patient Active Problem List   Diagnosis     Acute pancreatitis due to hypertriglyceridemia     Chronic GERD     Hypertriglyceridemia     History reviewed. No pertinent surgical history.    Social History "     Tobacco Use     Smoking status: Former Smoker     Packs/day: 0.10     Types: Cigarettes     Smokeless tobacco: Never Used   Substance Use Topics     Alcohol use: Yes     Frequency: 2-4 times a month     Comment: rare     Family History   Problem Relation Age of Onset     Diabetes Maternal Great-Grandmother      Diabetes Maternal Uncle      Diabetes Paternal Grandfather      Hyperlipidemia Paternal Grandfather      Arthritis Mother      Hypertension Father      Hyperlipidemia Father      Arthritis Father      Substance Abuse Father          Current Outpatient Medications   Medication Sig Dispense Refill     calcium carbonate 750 MG CHEW Take 750 mg by mouth daily as needed       gemfibrozil (LOPID) 600 MG tablet Take 1 tablet (600 mg) by mouth 2 times daily (before meals) 180 tablet 1     pantoprazole (PROTONIX) 40 MG EC tablet Take 1 tablet (40 mg) by mouth 2 times daily 180 tablet 1     No Known Allergies  Recent Labs   Lab Test 04/10/19  1104 03/28/19  1328 03/25/19  0541 03/24/19  1927 03/24/19  0512  03/23/19  0541  03/22/19  0456  03/21/19  0451  03/20/19  1247 03/20/19  0506   A1C  --  5.6  --   --   --   --   --   --   --   --   --   --   --  5.7*   LDL Cannot estimate LDL when triglyceride exceeds 400 mg/dL  --   --   --   --   --   --   --   --   --   --   --   --  Cannot estimate LDL when triglyceride exceeds 400 mg/dL   HDL 23*  --   --   --   --   --   --   --   --   --   --   --   --  17*   TRIG 476* 301* 337* 466* 430*   < > 578*   < > 684*   < > 1,263*   < >  --  4,463*   ALT  --   --   --   --   --   --  45  --  42  --  57  --   --  79*   CR  --   --   --   --  0.86  --  0.85  --  0.82  --  0.80  Canceled, Test credited  --   --  0.80   GFRESTIMATED  --   --   --   --  >90  --  >90  --  >90  --  >90  Canceled, Test credited  --   --  >90   GFRESTBLACK  --   --   --   --  >90  --  >90  --  >90  --  >90  Canceled, Test credited  --   --  >90   POTASSIUM  --   --  3.6 3.4 3.2*  --  3.4  --  3.4   "--  3.7  Canceled, Test credited  --   --  3.9   TSH  --   --   --   --   --   --   --   --   --   --   --   --  2.08  --     < > = values in this interval not displayed.      BP Readings from Last 3 Encounters:   04/10/19 120/82   03/28/19 121/82   03/25/19 124/84    Wt Readings from Last 3 Encounters:   04/10/19 105.7 kg (233 lb)   03/28/19 104.5 kg (230 lb 6.4 oz)   03/24/19 106.5 kg (234 lb 12.6 oz)                     ROS: 10 point ROS neg other than the symptoms noted above in the HPI.    OBJECTIVE:     /82 (BP Location: Right arm, Patient Position: Chair, Cuff Size: Adult Large)   Pulse 84   Temp 98.7  F (37.1  C) (Oral)   Resp 16   Ht 1.778 m (5' 10\")   Wt 105.7 kg (233 lb)   SpO2 99%   BMI 33.43 kg/m    Body mass index is 33.43 kg/m .  GENERAL: healthy, alert and no distress  HENT: ear canals and TM's normal, pharynx without erythema  NECK: no adenopathy, no asymmetry  RESP: lungs clear to auscultation - no rales, rhonchi or wheezes  CV: regular rate and rhythm, normal S1 S2, no S3 or S4, no murmur  ABDOMEN: soft, mild epigastric tenderness, no hepatosplenomegaly, no masses and bowel sounds normal  MS: no gross musculoskeletal defects noted      Diagnostic Test Results:  Results for orders placed or performed in visit on 04/10/19 (from the past 24 hour(s))   CBC with platelets differential   Result Value Ref Range    WBC 6.8 4.0 - 11.0 10e9/L    RBC Count 5.04 4.4 - 5.9 10e12/L    Hemoglobin 14.4 13.3 - 17.7 g/dL    Hematocrit 42.4 40.0 - 53.0 %    MCV 84 78 - 100 fl    MCH 28.6 26.5 - 33.0 pg    MCHC 34.0 31.5 - 36.5 g/dL    RDW 13.2 10.0 - 15.0 %    Platelet Count 333 150 - 450 10e9/L    % Neutrophils 39.8 %    % Lymphocytes 45.6 %    % Monocytes 8.9 %    % Eosinophils 4.7 %    % Basophils 1.0 %    Absolute Neutrophil 2.7 1.6 - 8.3 10e9/L    Absolute Lymphocytes 3.1 0.8 - 5.3 10e9/L    Absolute Monocytes 0.6 0.0 - 1.3 10e9/L    Absolute Eosinophils 0.3 0.0 - 0.7 10e9/L    Absolute Basophils " 0.1 0.0 - 0.2 10e9/L    Diff Method Automated Method    Lipid Profile (Chol, Trig, HDL, LDL calc)   Result Value Ref Range    Cholesterol 155 <200 mg/dL    Triglycerides 476 (H) <150 mg/dL    HDL Cholesterol 23 (L) >39 mg/dL    LDL Cholesterol Calculated  <100 mg/dL     Cannot estimate LDL when triglyceride exceeds 400 mg/dL    Non HDL Cholesterol 132 (H) <130 mg/dL       ASSESSMENT/PLAN:             1. Other acute pancreatitis, unspecified complication status    - CBC with platelets differential  - GASTROENTEROLOGY ADULT REF PROCEDURE ONLY  Will repeat CT scan in 3 months.    2. Other fatigue    - XR Chest 2 Views; Future    3. Hypertriglyceridemia, sporadic    - Lipid Profile (Chol, Trig, HDL, LDL calc)    4. Left shoulder pain, unspecified chronicity    - XR Shoulder Left G/E 3 Views; Future  - PHYSICAL THERAPY REFERRAL; Future    5. Bilateral low back pain with right-sided sciatica, unspecified chronicity    - PHYSICAL THERAPY REFERRAL; Future    6. Gastroesophageal reflux disease, esophagitis presence not specified    - GASTROENTEROLOGY ADULT REF PROCEDURE ONLY    7. Hypertriglyceridemia    - gemfibrozil (LOPID) 600 MG tablet; Take 1 tablet (600 mg) by mouth 2 times daily (before meals)  Dispense: 180 tablet; Refill: 1    8. Acute biliary pancreatitis, unspecified complication status    - pantoprazole (PROTONIX) 40 MG EC tablet; Take 1 tablet (40 mg) by mouth 2 times daily  Dispense: 180 tablet; Refill: 1    9. Need for vaccination    - TDAP VACCINE (ADACEL) [87545.002]  - 1st  Administration  [07052]    See Patient Instructions  Patient Instructions   Tetanus booster given today.  Will be notified of pending labs and shoulder and chest x ray.  To PT for left shoulder and low back.  Schedule EGD.  Continue with healthy diet, fluids, no pop, no alcohol and all other medications.  Will repeat abdominal CT in 3 months.      Our Clinic hours are:  Mondays    7:20 am - 7 pm  Tues -  Fri  7:20 am - 5 pm    Clinic  Phone: [phone]    The clinic lab opens at 7:30 am Mon - Fri and appointments are required.    [facility] [place]  Ph. [phone]  Monday  8 am - 7pm  Tues - Fri 8 am - 5:30 pm             GRANT Fajardo [facility]

## 2019-04-25 ENCOUNTER — ANESTHESIA EVENT (OUTPATIENT)
Dept: GASTROENTEROLOGY | Facility: CLINIC | Age: 34
End: 2019-04-25

## 2019-04-25 ASSESSMENT — LIFESTYLE VARIABLES: TOBACCO_USE: 1

## 2019-04-25 NOTE — ANESTHESIA PREPROCEDURE EVALUATION
Anesthesia Pre-Procedure Evaluation    Patient: Booker Quintana   MRN: [id number] : 1985          Preoperative Diagnosis: heartburn and recent pancreatitis, gastroesophageal reflux   diagnostic    Procedure(s):  ESOPHAGOGASTRODUODENOSCOPY (EGD)    Past Medical History:   Diagnosis Date     Pancreatitis      Scheuermann disease      No past surgical history on file.    Anesthesia Evaluation     . Pt has not had prior anesthetic            ROS/MED HX    ENT/Pulmonary:     (+)tobacco use, Past use , . .    Neurologic:  - neg neurologic ROS     Cardiovascular:     (+) Dyslipidemia, ----. : . . . :. .       METS/Exercise Tolerance:  >4 METS   Hematologic:  - neg hematologic  ROS       Musculoskeletal:  - neg musculoskeletal ROS       GI/Hepatic:     (+) GERD Other GI/Hepatic Hx pancreatitis      Renal/Genitourinary:  - ROS Renal section negative       Endo:     (+) Obesity, .      Psychiatric:  - neg psychiatric ROS       Infectious Disease:  - neg infectious disease ROS       Malignancy:      - no malignancy   Other:    - neg other ROS                      Physical Exam  Normal systems: cardiovascular, pulmonary and dental    Airway   Mallampati: II  TM distance: >3 FB  Neck ROM: full    Dental     Cardiovascular       Pulmonary             Lab Results   Component Value Date    WBC 6.8 04/10/2019    HGB 14.4 04/10/2019    HCT 42.4 04/10/2019     04/10/2019     2019    POTASSIUM 3.6 2019    CHLORIDE 102 2019    CO2 29 2019    BUN 7 2019    CR 0.86 2019     (H) 2019    BRIAN 8.6 2019    ALBUMIN 2.8 (L) 2019    PROTTOTAL 7.0 2019    ALT 45 2019    AST 24 2019    ALKPHOS 58 2019    BILITOTAL 1.2 2019    LIPASE 106 2019    TSH 2.08 2019       Preop Vitals  BP Readings from Last 3 Encounters:   04/10/19 120/82   19 121/82   19 124/84    Pulse Readings from Last 3 Encounters:   04/10/19 84  "  03/28/19 79   03/24/19 90      Resp Readings from Last 3 Encounters:   04/10/19 16   03/25/19 12    SpO2 Readings from Last 3 Encounters:   04/10/19 99%   03/28/19 96%   03/25/19 96%      Temp Readings from Last 1 Encounters:   04/10/19 37.1  C (98.7  F) (Oral)    Ht Readings from Last 1 Encounters:   04/10/19 1.778 m (5' 10\")      Wt Readings from Last 1 Encounters:   04/10/19 105.7 kg (233 lb)    Estimated body mass index is 33.43 kg/m  as calculated from the following:    Height as of 4/10/19: 1.778 m (5' 10\").    Weight as of 4/10/19: 105.7 kg (233 lb).       Anesthesia Plan      History & Physical Review  History and physical reviewed and following examination; no interval change.    ASA Status:  2 .    NPO Status:  > 8 hours    Plan for General and MAC with Propofol and Intravenous induction. Maintenance will be TIVA.  Reason for MAC:  Deep or markedly invasive procedure (G8)  PONV prophylaxis:  Ondansetron (or other 5HT-3) and Dexamethasone or Solumedrol       Postoperative Care  Postoperative pain management:  IV analgesics and Oral pain medications.      Consents  Anesthetic plan, risks, benefits and alternatives discussed with:  Patient..                 GRANT Garcia CRNA  "

## 2019-04-29 ENCOUNTER — ANESTHESIA (OUTPATIENT)
Dept: GASTROENTEROLOGY | Facility: CLINIC | Age: 34
End: 2019-04-29

## 2019-04-29 ENCOUNTER — HOSPITAL ENCOUNTER (OUTPATIENT)
Facility: CLINIC | Age: 34
Discharge: HOME | End: 2019-04-29
Attending: SURGERY | Admitting: SURGERY

## 2019-04-29 VITALS
OXYGEN SATURATION: 96 % | SYSTOLIC BLOOD PRESSURE: 120 MMHG | BODY MASS INDEX: 33.36 KG/M2 | RESPIRATION RATE: 16 BRPM | HEIGHT: 70 IN | TEMPERATURE: 98.5 F | WEIGHT: 233 LBS | DIASTOLIC BLOOD PRESSURE: 82 MMHG | HEART RATE: 72 BPM

## 2019-04-29 LAB — UPPER GI ENDOSCOPY: NORMAL

## 2019-04-29 PROCEDURE — 25000132 ZZH RX MED GY IP 250 OP 250 PS 637: Performed by: SURGERY

## 2019-04-29 PROCEDURE — 88305 TISSUE EXAM BY PATHOLOGIST: CPT | Performed by: SURGERY

## 2019-04-29 PROCEDURE — 25000128 H RX IP 250 OP 636: Performed by: NURSE ANESTHETIST, CERTIFIED REGISTERED

## 2019-04-29 PROCEDURE — 43251 EGD REMOVE LESION SNARE: CPT | Performed by: SURGERY

## 2019-04-29 PROCEDURE — 25000125 ZZHC RX 250: Performed by: SURGERY

## 2019-04-29 PROCEDURE — 88341 IMHCHEM/IMCYTCHM EA ADD ANTB: CPT | Performed by: SURGERY

## 2019-04-29 PROCEDURE — 88342 IMHCHEM/IMCYTCHM 1ST ANTB: CPT | Performed by: SURGERY

## 2019-04-29 PROCEDURE — 25000125 ZZHC RX 250: Performed by: NURSE ANESTHETIST, CERTIFIED REGISTERED

## 2019-04-29 PROCEDURE — 25800030 ZZH RX IP 258 OP 636: Performed by: SURGERY

## 2019-04-29 PROCEDURE — 37000008 ZZH ANESTHESIA TECHNICAL FEE, 1ST 30 MIN: Performed by: SURGERY

## 2019-04-29 PROCEDURE — 43239 EGD BIOPSY SINGLE/MULTIPLE: CPT | Performed by: SURGERY

## 2019-04-29 RX ORDER — SODIUM CHLORIDE, SODIUM LACTATE, POTASSIUM CHLORIDE, CALCIUM CHLORIDE 600; 310; 30; 20 MG/100ML; MG/100ML; MG/100ML; MG/100ML
INJECTION, SOLUTION INTRAVENOUS CONTINUOUS
Status: DISCONTINUED | OUTPATIENT
Start: 2019-04-29 | End: 2019-04-29 | Stop reason: HOSPADM

## 2019-04-29 RX ORDER — LIDOCAINE 40 MG/G
CREAM TOPICAL
Status: DISCONTINUED | OUTPATIENT
Start: 2019-04-29 | End: 2019-04-29 | Stop reason: HOSPADM

## 2019-04-29 RX ORDER — LIDOCAINE HYDROCHLORIDE 10 MG/ML
INJECTION, SOLUTION INFILTRATION; PERINEURAL PRN
Status: DISCONTINUED | OUTPATIENT
Start: 2019-04-29 | End: 2019-04-29

## 2019-04-29 RX ORDER — PROPOFOL 10 MG/ML
INJECTION, EMULSION INTRAVENOUS PRN
Status: DISCONTINUED | OUTPATIENT
Start: 2019-04-29 | End: 2019-04-29

## 2019-04-29 RX ORDER — ONDANSETRON 2 MG/ML
4 INJECTION INTRAMUSCULAR; INTRAVENOUS
Status: DISCONTINUED | OUTPATIENT
Start: 2019-04-29 | End: 2019-04-29 | Stop reason: HOSPADM

## 2019-04-29 RX ORDER — PROPOFOL 10 MG/ML
INJECTION, EMULSION INTRAVENOUS CONTINUOUS PRN
Status: DISCONTINUED | OUTPATIENT
Start: 2019-04-29 | End: 2019-04-29

## 2019-04-29 RX ORDER — SIMETHICONE 40MG/0.6ML
SUSPENSION, DROPS(FINAL DOSAGE FORM)(ML) ORAL PRN
Status: DISCONTINUED | OUTPATIENT
Start: 2019-04-29 | End: 2019-04-29 | Stop reason: HOSPADM

## 2019-04-29 RX ADMIN — LIDOCAINE HYDROCHLORIDE 1 ML: 10 INJECTION, SOLUTION EPIDURAL; INFILTRATION; INTRACAUDAL; PERINEURAL at 07:38

## 2019-04-29 RX ADMIN — LIDOCAINE HYDROCHLORIDE 50 MG: 10 INJECTION, SOLUTION INFILTRATION; PERINEURAL at 08:01

## 2019-04-29 RX ADMIN — SODIUM CHLORIDE, POTASSIUM CHLORIDE, SODIUM LACTATE AND CALCIUM CHLORIDE: 600; 310; 30; 20 INJECTION, SOLUTION INTRAVENOUS at 07:37

## 2019-04-29 RX ADMIN — PROPOFOL 100 MG: 10 INJECTION, EMULSION INTRAVENOUS at 08:02

## 2019-04-29 RX ADMIN — PROPOFOL 200 MCG/KG/MIN: 10 INJECTION, EMULSION INTRAVENOUS at 08:02

## 2019-04-29 ASSESSMENT — MIFFLIN-ST. JEOR: SCORE: 2013.13

## 2019-04-29 NOTE — ANESTHESIA CARE TRANSFER NOTE
Patient: Booker Quintana    Procedure(s):  ESOPHAGOGASTRODUODENOSCOPY, WITH BIOPSY    Diagnosis: heartburn and recent pancreatitis, gastroesophageal reflux   diagnostic  Diagnosis Additional Information: No value filed.    Anesthesia Type:   No value filed.     Note:  Airway :Nasal Cannula  Patient transferred to:Phase II  Handoff Report: Identifed the Patient, Identified the Reponsible Provider, Reviewed the pertinent medical history, Discussed the surgical course, Reviewed Intra-OP anesthesia mangement and issues during anesthesia, Set expectations for post-procedure period and Allowed opportunity for questions and acknowledgement of understanding      Vitals: (Last set prior to Anesthesia Care Transfer)    CRNA VITALS  4/29/2019 0741 - 4/29/2019 0811      4/29/2019             Pulse:  77    Ht Rate:  79    SpO2:  99 %                Electronically Signed By: GRANT Das CRNA  April 29, 2019  8:11 AM

## 2019-04-29 NOTE — ANESTHESIA POSTPROCEDURE EVALUATION
Patient: Booker Quintana    Procedure(s):  ESOPHAGOGASTRODUODENOSCOPY, WITH BIOPSY    Diagnosis:heartburn and recent pancreatitis, gastroesophageal reflux   diagnostic  Diagnosis Additional Information: No value filed.    Anesthesia Type:  No value filed.    Note:  Anesthesia Post Evaluation    Patient location during evaluation: Bedside  Patient participation: Able to fully participate in evaluation  Level of consciousness: sleepy but conscious  Pain management: adequate  Airway patency: patent  Cardiovascular status: stable  Respiratory status: nasal cannula  Hydration status: stable  PONV: none     Anesthetic complications: None          Last vitals:  Vitals:    04/29/19 0715   BP: 132/81   Resp: 18   Temp: 36.9  C (98.5  F)   SpO2: 96%         Electronically Signed By: GRANT Das CRNA  April 29, 2019  8:12 AM

## 2019-05-01 LAB — COPATH REPORT: NORMAL

## 2019-05-08 ENCOUNTER — OFFICE VISIT (OUTPATIENT)
Dept: FAMILY MEDICINE | Facility: CLINIC | Age: 34
End: 2019-05-08

## 2019-05-08 VITALS
HEIGHT: 70 IN | OXYGEN SATURATION: 96 % | WEIGHT: 235 LBS | HEART RATE: 82 BPM | BODY MASS INDEX: 33.64 KG/M2 | SYSTOLIC BLOOD PRESSURE: 118 MMHG | DIASTOLIC BLOOD PRESSURE: 80 MMHG | TEMPERATURE: 98.5 F | RESPIRATION RATE: 16 BRPM

## 2019-05-08 DIAGNOSIS — M54.42 BILATERAL LOW BACK PAIN WITH LEFT-SIDED SCIATICA, UNSPECIFIED CHRONICITY: ICD-10-CM

## 2019-05-08 DIAGNOSIS — Z87.19 HISTORY OF PANCREATITIS: Primary | ICD-10-CM

## 2019-05-08 DIAGNOSIS — E78.1 HYPERTRIGLYCERIDEMIA: ICD-10-CM

## 2019-05-08 DIAGNOSIS — M25.512 LEFT SHOULDER PAIN, UNSPECIFIED CHRONICITY: ICD-10-CM

## 2019-05-08 PROCEDURE — 99214 OFFICE O/P EST MOD 30 MIN: CPT | Performed by: NURSE PRACTITIONER

## 2019-05-08 RX ORDER — ACETAMINOPHEN 500 MG
500-1000 TABLET ORAL EVERY 6 HOURS PRN
COMMUNITY
End: 2019-09-03

## 2019-05-08 ASSESSMENT — MIFFLIN-ST. JEOR: SCORE: 2022.2

## 2019-05-08 ASSESSMENT — PAIN SCALES - GENERAL: PAINLEVEL: MILD PAIN (2)

## 2019-05-08 NOTE — PROGRESS NOTES
SUBJECTIVE:   Booker Quintana is a 33 year old male who presents to clinic today for the following   health issues:      Pt is here for a recheck on pancreatitis and he has not gotten results on his UGI.        Additional history: he states he's generally doing pretty good. He has made a lot of lifestyle changes, mostly due to help from his wife.  His EGD was without concerning findings. Denies abdominal pain or heartburn.  He has had low back pain with some radiation into left which he saw chiropractor for and that really helped.  Left shoulder issues and that seems to be improved. He reports being cash pay so wishes to hold off on treatments.  Continues to work for [employer].      Reviewed  and updated as needed this visit by clinical staff  Tobacco  Allergies  Meds  Med Hx  Surg Hx  Fam Hx  Soc Hx        Reviewed and updated as needed this visit by Provider         Patient Active Problem List   Diagnosis     Acute pancreatitis due to hypertriglyceridemia     Chronic GERD     Hypertriglyceridemia     Past Surgical History:   Procedure Laterality Date     ESOPHAGOSCOPY, GASTROSCOPY, DUODENOSCOPY (EGD), COMBINED N/A 4/29/2019    Procedure: ESOPHAGOGASTRODUODENOSCOPY, WITH BIOPSY;  Surgeon: Jey Guevara MD;  Location: [facility]       Social History     Tobacco Use     Smoking status: Former Smoker     Packs/day: 0.10     Types: Cigarettes     Smokeless tobacco: Never Used   Substance Use Topics     Alcohol use: Yes     Frequency: 2-4 times a month     Comment: rare     Family History   Problem Relation Age of Onset     Diabetes Maternal Great-Grandmother      Diabetes Maternal Uncle      Diabetes Paternal Grandfather      Hyperlipidemia Paternal Grandfather      Arthritis Mother      Hypertension Father      Hyperlipidemia Father      Arthritis Father      Substance Abuse Father          Current Outpatient Medications   Medication Sig Dispense Refill     acetaminophen (TYLENOL) 500 MG tablet  Take 500-1,000 mg by mouth every 6 hours as needed for mild pain       gemfibrozil (LOPID) 600 MG tablet Take 1 tablet (600 mg) by mouth 2 times daily (before meals) 180 tablet 1     pantoprazole (PROTONIX) 40 MG EC tablet Take 1 tablet (40 mg) by mouth 2 times daily 180 tablet 1     Allergies   Allergen Reactions     Oxycodone Nausea     Recent Labs   Lab Test 04/10/19  1104 03/28/19  1328 03/25/19  0541 03/24/19  1927 03/24/19  0512  03/23/19  0541  03/22/19  0456  03/21/19  0451  03/20/19  1247 03/20/19  0506   A1C  --  5.6  --   --   --   --   --   --   --   --   --   --   --  5.7*   LDL Cannot estimate LDL when triglyceride exceeds 400 mg/dL  --   --   --   --   --   --   --   --   --   --   --   --  Cannot estimate LDL when triglyceride exceeds 400 mg/dL   HDL 23*  --   --   --   --   --   --   --   --   --   --   --   --  17*   TRIG 476* 301* 337* 466* 430*   < > 578*   < > 684*   < > 1,263*   < >  --  4,463*   ALT  --   --   --   --   --   --  45  --  42  --  57  --   --  79*   CR  --   --   --   --  0.86  --  0.85  --  0.82  --  0.80  Canceled, Test credited  --   --  0.80   GFRESTIMATED  --   --   --   --  >90  --  >90  --  >90  --  >90  Canceled, Test credited  --   --  >90   GFRESTBLACK  --   --   --   --  >90  --  >90  --  >90  --  >90  Canceled, Test credited  --   --  >90   POTASSIUM  --   --  3.6 3.4 3.2*  --  3.4  --  3.4  --  3.7  Canceled, Test credited  --   --  3.9   TSH  --   --   --   --   --   --   --   --   --   --   --   --  2.08  --     < > = values in this interval not displayed.      BP Readings from Last 3 Encounters:   05/08/19 118/80   04/29/19 120/82   04/10/19 120/82    Wt Readings from Last 3 Encounters:   05/08/19 106.6 kg (235 lb)   04/29/19 105.7 kg (233 lb)   04/10/19 105.7 kg (233 lb)                     ROS: 10 point ROS neg other than the symptoms noted above in the HPI.    OBJECTIVE:     /80 (BP Location: Right arm, Patient Position: Chair, Cuff Size: Adult Large)  "  Pulse 82   Temp 98.5  F (36.9  C) (Oral)   Resp 16   Ht 1.778 m (5' 10\")   Wt 106.6 kg (235 lb)   SpO2 96%   BMI 33.72 kg/m    Body mass index is 33.72 kg/m .  GENERAL: healthy, alert and no distress  HENT: pharynx without erythema  NECK: no adenopathy, no asymmetry  RESP: lungs clear to auscultation - no rales, rhonchi or wheezes  CV: regular rate and rhythm, normal S1 S2, no S3 or S4, no murmur  ABDOMEN: soft, nontender, no hepatosplenomegaly, no masses and bowel sounds normal  MS: no gross musculoskeletal defects noted      Diagnostic Test Results:  No results found for this or any previous visit (from the past 24 hour(s)).    ASSESSMENT/PLAN:             1. History of pancreatitis    No present concerns, continue with healthy lifestyle changes. Repeat CT in 2 months and labs.  Follow up sooner if any concerns.    2. Bilateral low back pain with left-sided sciatica, unspecified chronicity    Improved with chiropractor, continue to monitor.    3. Left shoulder pain, unspecified chronicity    Denies concerns, will continue to monitor, consider PT and repeat x ray if pain becomes problematic.    4. Hypertriglyceridemia    - Lipid panel reflex to direct LDL Fasting; Future    See Patient Instructions  Patient Instructions   Continue with healthy diet.  Return for fasting lipids in the next few weeks.  Will see you back in July and will repeat CT scan.  Come in sooner if any concerns.      Our Clinic hours are:  Mondays    7:20 am - 7 pm  Tues -  Fri  7:20 am - 5 pm    Clinic Phone: [phone]    The clinic lab opens at 7:30 am Mon - Fri and appointments are required.    [facility] [place]  Ph. [phone]  Monday  8 am - 7pm  Tues - Fri 8 am - 5:30 pm             GRANT Fajardo CNP  [facility]      "

## 2019-05-08 NOTE — PATIENT INSTRUCTIONS
Continue with healthy diet.  Return for fasting lipids in the next few weeks.  Will see you back in July and will repeat CT scan.  Come in sooner if any concerns.      Our Clinic hours are:  Mondays    7:20 am - 7 pm  Tues -  Fri  7:20 am - 5 pm    Clinic Phone: [phone]    The clinic lab opens at 7:30 am Mon - Fri and appointments are required.    [facility]  Ph. [phone]  Monday  8 am - 7pm  Tues - Fri 8 am - 5:30 pm

## 2019-05-22 DIAGNOSIS — E78.1 HYPERTRIGLYCERIDEMIA: ICD-10-CM

## 2019-05-22 LAB
CHOLEST SERPL-MCNC: 208 MG/DL
HDLC SERPL-MCNC: 15 MG/DL
LDLC SERPL CALC-MCNC: ABNORMAL MG/DL
LDLC SERPL DIRECT ASSAY-MCNC: 57 MG/DL
NONHDLC SERPL-MCNC: 193 MG/DL
TRIGL SERPL-MCNC: 2892 MG/DL

## 2019-05-22 PROCEDURE — 36415 COLL VENOUS BLD VENIPUNCTURE: CPT | Performed by: NURSE PRACTITIONER

## 2019-05-22 PROCEDURE — 83721 ASSAY OF BLOOD LIPOPROTEIN: CPT | Performed by: NURSE PRACTITIONER

## 2019-05-22 PROCEDURE — 80061 LIPID PANEL: CPT | Performed by: NURSE PRACTITIONER

## 2019-05-23 ENCOUNTER — MYC MEDICAL ADVICE (OUTPATIENT)
Dept: FAMILY MEDICINE | Facility: CLINIC | Age: 34
End: 2019-05-23

## 2019-05-23 DIAGNOSIS — E78.1 HYPERTRIGLYCERIDEMIA: Primary | ICD-10-CM

## 2019-05-23 NOTE — TELEPHONE ENCOUNTER
No changes in diet, no alcohol use and no missed medications.  He is continuing to eat a healthy diet.  He is currently not having any pain.  He is wondering what is the next step?     Ok to leave detailed voicemail.    Rosanna Miller RN

## 2019-05-23 NOTE — TELEPHONE ENCOUNTER
Call and see if any changes.  Still taking the gemfibrozil? Any alcohol use?     The triglycerides are quite elevated again and I don't see that anything else has changed. May benefit from seeing a lipid specialist/cardiologist at the [facility] lipid clinic.    Odessa Ayers M.D.

## 2019-05-23 NOTE — TELEPHONE ENCOUNTER
Patient asking for lab results from yesterday.  Can you advise in Lenora Cobian's absence?    Rosanna Miller RN

## 2019-05-23 NOTE — TELEPHONE ENCOUNTER
I'd recommend Dr. Carlo Hernandez or one of the other lipid specialists at the [facility].     [phone]    Referral placed.    Odessa Ayers M.D.

## 2019-05-24 DIAGNOSIS — Z87.19 HISTORY OF PANCREATITIS: Primary | ICD-10-CM

## 2019-05-24 NOTE — TELEPHONE ENCOUNTER
RECORDS RECEIVED FROM: Internal   DATE RECEIVED: 6-3   NOTES STATUS DETAILS   OFFICE NOTE from referring provider    Internal    OFFICE NOTE from other cardiologist    N/A    DISCHARGE SUMMARY from hospital    N/A    DISCHARGE REPORT from the ER   N/A    OPERATIVE REPORT    N/A    MEDICATION LIST   Internal    LABS     BMP   Internal 3-24-19  3-21-19   CBC   Internal 3-28-19  3-24-19   CMP   Internal 3-23-19  3-22-19   Lipids   Internal 5-22-19  4-10-19   TSH   Internal 3-20-19   DIAGNOSTIC PROCEDURES     EKG   N/A    Monitor Reports   N/A    IMAGING (DISC & REPORT)      Echo   N/A    Stress Tests   N/A    Cath   N/A    MRI/MRA   N/A    CT/CTA   N/A

## 2019-06-03 ENCOUNTER — OFFICE VISIT (OUTPATIENT)
Dept: CARDIOLOGY | Facility: CLINIC | Age: 34
End: 2019-06-03
Attending: INTERNAL MEDICINE

## 2019-06-03 ENCOUNTER — PRE VISIT (OUTPATIENT)
Dept: CARDIOLOGY | Facility: CLINIC | Age: 34
End: 2019-06-03

## 2019-06-03 VITALS
BODY MASS INDEX: 36.04 KG/M2 | DIASTOLIC BLOOD PRESSURE: 88 MMHG | HEART RATE: 75 BPM | OXYGEN SATURATION: 97 % | HEIGHT: 69 IN | SYSTOLIC BLOOD PRESSURE: 130 MMHG | WEIGHT: 243.3 LBS

## 2019-06-03 DIAGNOSIS — E78.1 HYPERTRIGLYCERIDEMIA: Primary | ICD-10-CM

## 2019-06-03 PROCEDURE — 93010 ELECTROCARDIOGRAM REPORT: CPT | Mod: ZP | Performed by: INTERNAL MEDICINE

## 2019-06-03 PROCEDURE — 93005 ELECTROCARDIOGRAM TRACING: CPT | Mod: ZF

## 2019-06-03 PROCEDURE — G0463 HOSPITAL OUTPT CLINIC VISIT: HCPCS | Mod: 25,ZF

## 2019-06-03 PROCEDURE — 99203 OFFICE O/P NEW LOW 30 MIN: CPT | Mod: ZP | Performed by: INTERNAL MEDICINE

## 2019-06-03 RX ORDER — OMEGA-3-ACID ETHYL ESTERS 1 G/1
2 CAPSULE, LIQUID FILLED ORAL 2 TIMES DAILY
Qty: 360 CAPSULE | Refills: 3 | Status: SHIPPED | OUTPATIENT
Start: 2019-06-03 | End: 2019-11-21

## 2019-06-03 RX ORDER — FENOFIBRATE 145 MG/1
145 TABLET, COATED ORAL 2 TIMES DAILY
Qty: 180 TABLET | Refills: 3 | Status: SHIPPED | OUTPATIENT
Start: 2019-06-03 | End: 2020-07-28

## 2019-06-03 ASSESSMENT — PAIN SCALES - GENERAL: PAINLEVEL: NO PAIN (0)

## 2019-06-03 ASSESSMENT — MIFFLIN-ST. JEOR: SCORE: 2043.98

## 2019-06-03 NOTE — NURSING NOTE
Vitals completed successfully and medication reconciled.     Anita Tidwell, GODFREY  11:30 AM  Chief Complaint   Patient presents with     New Patient      New Pt Visit - Lipid Clinic

## 2019-06-03 NOTE — PATIENT INSTRUCTIONS
Patient Instructions:  It was a pleasure to see you in the cardiology clinic today.      If you have any questions, you can reach my nurse, Kathy Cooper LPN, at [phone].  Press Option #1 for the [facility], and then press Option #3 for nursing.    We are encouraging the use of [email or web address] to communicate with your HealthCare Provider    Medication Changes: Coupon printed from [employer]  - Stop Gemfibrozil  - Start Fenofibrate 145 mg twice daily.  For the first 3 weeks start with 145 mg every day.  - Start Lovaza/Vascepa 2 G twice daily.    Recommendations:  Complete fasting labs in 6 weeks at your local [place] lab.    Studies Ordered: None    The results from today include: EKG.    Please follow up: With Dr. Hernandez based on results of labs.    Sincerely,    Carlo Hernandez MD     If you have an urgent need after hours (8:00 am to 4:30 pm) please call [phone] and ask for the cardiology fellow on call.    Patient Education     Omega-3 Fatty Acids Oral capsule, liquid filled  What is this medicine?  FISH OIL, OMEGA-3 FATTY ACIDS (Fish Oil, oh MAY ga 3 fatty AS ids) are essential fats. It is used to treat high triglyceride levels.  This medicine may be used for other purposes; ask your health care provider or pharmacist if you have questions.  What should I tell my health care provider before I take this medicine?  They need to know if you have any of these conditions    bleeding problems    history of irregular heartbeat    lung or breathing disease, like asthma    an unusual or allergic reaction to fish oil, omega-3 fatty acids, fish, other medicines, foods, dyes, or preservatives    pregnant or trying to get pregnant    breast-feeding  How should I use this medicine?  Take this medicine by mouth with a glass of water. Follow the directions on the prescription label. Lovaza and Epanova may be taken with or without food. Take Omtryg with food. Take your medicine at regular  intervals. Do not take your medicine more often than directed.  Talk to your pediatrician regarding the use of this medicine in children. Special care may be needed.  Overdosage: If you think you have taken too much of this medicine contact a poison control center or emergency room at once.  NOTE: This medicine is only for you. Do not share this medicine with others.  What if I miss a dose?  If you miss a dose, take it as soon as you can. If it is almost time for your next dose, take only that dose. Do not take double or extra doses.  What may interact with this medicine?    aspirin and aspirin-like medicines    herbal products like danshen, dong quai, garlic pills, dilan, ginkgo biloba, horse chestnut, willow bark, and others    medicines that treat or prevent blood clots like enoxaparin, heparin, warfarin  This list may not describe all possible interactions. Give your health care provider a list of all the medicines, herbs, non-prescription drugs, or dietary supplements you use. Also tell them if you smoke, drink alcohol, or use illegal drugs. Some items may interact with your medicine.  What should I watch for while using this medicine?  Follow a good diet and exercise plan. Taking this medicine does not replace a healthy lifestyle. Some foods that have omega-3 fatty acids naturally are fatty fish like albacore tuna, halibut, herring, mackerel, lake trout, salmon, and sardines.  If you are scheduled for any medical or dental procedure, tell your healthcare provider that you are taking this medicine. You may need to stop taking this medicine before the procedure.  What side effects may I notice from receiving this medicine?  Side effects that you should report to your doctor or health care professional as soon as possible:    allergic reactions like skin rash, itching or hives, swelling of the face, lips, or tongue    breathing problems    chest pain    fever, infection    unusual bleeding or bruising  Side  effects that usually do not require medical attention (report to your doctor or health care professional if they continue or are bothersome):    bad or fishy breath    belching    body odor    diarrhea    nausea    stomach gas, upset    weight gain  This list may not describe all possible side effects. Call your doctor for medical advice about side effects. You may report side effects to FDA at 0-636-UJB-9932.  Where should I keep my medicine?  Keep out of the reach of children.  Store at room temperature between 15 and 30 degrees C (59 and 86 degrees F). Do not freeze. Throw away any unused medicine after the expiration date.  NOTE:This sheet is a summary. It may not cover all possible information. If you have questions about this medicine, talk to your doctor, pharmacist, or health care provider. Copyright  2016 [employer] Standard           Patient Education     Patient Education    Fenofibrate Oral capsule    Fenofibrate Oral tablet    Fenofibrate, Micronized Oral capsule    Fenofibrate, Micronized Oral tablet  Fenofibrate Oral tablet  What is this medicine?  FENOFIBRATE (fen oh FYE brate) can help lower blood fats and cholesterol for people who are at risk of getting inflammation of the pancreas (pancreatitis) from having very high amounts of fats in their blood. This medicine is only for patients whose blood fats are not controlled by diet.  This medicine may be used for other purposes; ask your health care provider or pharmacist if you have questions.  What should I tell my health care provider before I take this medicine?  They need to know if you have any of these conditions:    gallbladder disease    heart disease    kidney disease    liver disease    an unusual or allergic reaction to fenofibrate, gemfibrozil, other medicines, foods, dyes, or preservatives    pregnant or trying to get pregnant    breast-feeding  How should I use this medicine?  Take this medicine by mouth with a glass of water. Follow the  directions on the prescription label. Do not take chipped or broken tablets. Take your medicine at regular intervals. Do not take it more often than directed. Do not stop taking except on your doctor's advice.  Talk to your pediatrician regarding the use of this medicine in children. Special care may be needed.  Overdosage: If you think you have taken too much of this medicine contact a poison control center or emergency room at once.  NOTE: This medicine is only for you. Do not share this medicine with others.  What if I miss a dose?  If you miss a dose, take it as soon as you can. If it is almost time for your next dose, take only that dose. Do not take double or extra doses.  What may interact with this medicine?  Do not take this medicine with any of the following medications:    ezetimibe    statin-type cholesterol lowering drugs like atorvastatin, cerivastatin, fluvastatin, lovastatin, pravastatin, or simvastatin    red yeast rice  This medicine may also interact with the following medications:    cholestyramine or colestipol    cyclosporine    warfarin  This list may not describe all possible interactions. Give your health care provider a list of all the medicines, herbs, non-prescription drugs, or dietary supplements you use. Also tell them if you smoke, drink alcohol, or use illegal drugs. Some items may interact with your medicine.  What should I watch for while using this medicine?  Visit your doctor or health care professional for regular checks on your progress. Your blood fats and other tests will be measured from time to time. Do not stop taking this medicine except on the advice of your doctor or health care professional.  This medicine is only part of a total cholesterol-lowering program. Your health care professional or dietician can suggest a low-cholesterol and low-fat diet that will reduce your risk of getting heart and blood vessel disease. Avoid alcohol and smoking, and keep a proper exercise  schedule.  If you are diabetic, close regulation and monitoring of your blood sugars can help your blood fat levels. This medicine may change the way your diabetic medication works, and sometimes will require that your dosages be adjusted. Check with your doctor or health care professional.  This medicine can make you more sensitive to the sun. Keep out of the sun. If you cannot avoid being in the sun, wear protective clothing and use sunscreen. Do not use sun lamps or tanning beds/booths.  What side effects may I notice from receiving this medicine?  Side effects that you should report to your doctor or health care professional as soon as possible:    allergic reactions like skin rash, itching or hives, swelling of the face, lips, or tongue    dark urine    lower back or side pain    muscle pain, tenderness, or weakness    skin-bruising    stomach pain    trouble passing urine or change in the amount of urine    unusually weak or tired    yellowing of the eyes or skin  Side effects that usually do not require medical attention (report to your doctor or health care professional if they continue or are bothersome):    constipation    headache    nausea  This list may not describe all possible side effects. Call your doctor for medical advice about side effects. You may report side effects to FDA at 9-169-FDA-4991.  Where should I keep my medicine?  Keep out of the reach of children.  Store the tablets in the original container at room temperature between 15 and 30 degrees C (59 and 86 degrees F). Keep container tightly closed. Throw away any unused medicine after the expiration date.  NOTE:This sheet is a summary. It may not cover all possible information. If you have questions about this medicine, talk to your doctor, pharmacist, or health care provider. Copyright  2016 Gold Standard

## 2019-06-03 NOTE — PROGRESS NOTES
HPI:     I had the privilege to evaluate and examine Mr Booker Quintana, who is a 33 yr old male patient with a Hx of a recent hospitalization with acute onset of abdominal pain.  With following history:   Woke up with mild low back pain yesterday morning, initially thought it was pain from moving couch as the day before.  After lunch the pain migrated around to his mid abdomen and from there extended to the right lower quadrant and epigastrium.  Pain continued to worsen throughout the day.    Denied fever, chills, nausea, vomiting, black/bloody stools.  Denied any similar previous abdominal pain.  Denied history of pancreatitis.  Acknowledged occasional alcohol use but states it is no more than 3-5 drinks per month.    He did have two double rum and Cokes the day before admission.     He also acknowledges a long-standing history of severe heartburn for which he takes daily OTC omeprazole, ranitidine, and Tums.  He has never been worked up for this.     Denied chest pain, palpitations, dyspnea, cough, fevers, chills.     Pt reports that he has quit smoking. His smoking use included cigarettes. He smoked 0.10 packs per day. He has never used smokeless tobacco.      Patient comes for further therapy because of hypertriglyceridemia.  Paient realizes that he can do much better with his diet.    PAST MEDICAL HISTORY:  Past Medical History:   Diagnosis Date     Pancreatitis      Scheuermann disease        CURRENT MEDICATIONS:  Current Outpatient Medications   Medication Sig Dispense Refill     Bioflavonoid Products (VITAMIN C PLUS PO)        gemfibrozil (LOPID) 600 MG tablet Take 1 tablet (600 mg) by mouth 2 times daily (before meals) 180 tablet 1     pantoprazole (PROTONIX) 40 MG EC tablet Take 1 tablet (40 mg) by mouth 2 times daily 180 tablet 1     acetaminophen (TYLENOL) 500 MG tablet Take 500-1,000 mg by mouth every 6 hours as needed for mild pain         PAST SURGICAL HISTORY:  Past Surgical History:   Procedure  Laterality Date     ESOPHAGOSCOPY, GASTROSCOPY, DUODENOSCOPY (EGD), COMBINED N/A 4/29/2019    Procedure: ESOPHAGOGASTRODUODENOSCOPY, WITH BIOPSY;  Surgeon: Jey Guevara MD;  Location: [facility]       ALLERGIES     Allergies   Allergen Reactions     Oxycodone Nausea       FAMILY HISTORY:  Family History   Problem Relation Age of Onset     Diabetes Maternal Great-Grandmother      Diabetes Maternal Uncle      Diabetes Paternal Grandfather      Hyperlipidemia Paternal Grandfather      Arthritis Mother      Hypertension Father      Hyperlipidemia Father      Arthritis Father      Substance Abuse Father        SOCIAL HISTORY:  Social History     Socioeconomic History     Marital status: Single     Spouse name: None     Number of children: None     Years of education: None     Highest education level: None   Occupational History     None   Social Needs     Financial resource strain: None     Food insecurity:     Worry: None     Inability: None     Transportation needs:     Medical: None     Non-medical: None   Tobacco Use     Smoking status: Former Smoker     Packs/day: 0.10     Types: Cigarettes     Smokeless tobacco: Never Used   Substance and Sexual Activity     Alcohol use: Yes     Frequency: 2-4 times a month     Comment: rare     Drug use: No     Sexual activity: Yes     Partners: Female     Birth control/protection: Female Surgical   Lifestyle     Physical activity:     Days per week: None     Minutes per session: None     Stress: None   Relationships     Social connections:     Talks on phone: None     Gets together: None     Attends Sikh service: None     Active member of club or organization: None     Attends meetings of clubs or organizations: None     Relationship status: None     Intimate partner violence:     Fear of current or ex partner: None     Emotionally abused: None     Physically abused: None     Forced sexual activity: None   Other Topics Concern     None   Social History Narrative     None  "      ROS:   Constitutional: No fever, chills, or sweats. No weight gain/loss   ENT: No visual disturbance, ear ache, epistaxis, sore throat  Allergies/Immunologic: Negative.   Respiratory: No cough, hemoptysia  Cardiovascular: As per HPI  GI: No nausea, vomiting, hematemesis, melena, or hematochezia  : No urinary frequency, dysuria, or hematuria  Integument: Negative  Psychiatric: Negative  Neuro: Negative  Endocrinology: Negative   Musculoskeletal: Negative    EXAM:  /88 (BP Location: Right arm, Patient Position: Chair, Cuff Size: Adult Large)   Pulse 75   Ht 1.753 m (5' 9\")   Wt 110.4 kg (243 lb 4.8 oz)   SpO2 97%   BMI 35.93 kg/m    In general, the patient is a pleasant male in no apparent distress.    HEENT: NC/AT.  PERRLA.  EOMI.  Sclerae white, not injected.  Nares clear.  Pharynx without erythema or exudate.  Dentition intact.    Neck: No adenopathy.  No thyromegaly. Carotids +4/4 bilaterally without bruits.  No jugular venous distension.   Heart: RRR. Normal S1, S2 splits physiologically. No murmur, rub, click, or gallop. The PMI is in the 5th ICS in the midclavicular line. There is no heave.    Lungs: CTA.  No ronchi, wheezes, rales.  No dullness to percussion.   Abdomen: Soft, nontender, nondistended. No organomegaly.  No bruits.   Extremities: No clubbing, cyanosis, or edema.  The pulses are +4/4 at the radial, brachial, femoral, popliteal, DP, and PT sites bilaterally.  No bruits are noted.  Neurologic: Alert and oriented to person/place/time, normal speech, gait and affect  Skin: No petechiae, purpura or rash.    Labs:  LIPID RESULTS:  Lab Results   Component Value Date    CHOL 208 (H) 05/22/2019    HDL 15 (L) 05/22/2019    LDL  05/22/2019     Cannot estimate LDL when triglyceride exceeds 400 mg/dL    LDL 57 05/22/2019    TRIG 2,892 (H) 05/22/2019    NHDL 193 (H) 05/22/2019       LIVER ENZYME RESULTS:  Lab Results   Component Value Date    AST 24 03/23/2019    ALT 45 03/23/2019       CBC " RESULTS:  Lab Results   Component Value Date    WBC 6.8 04/10/2019    RBC 5.04 04/10/2019    HGB 14.4 04/10/2019    HCT 42.4 04/10/2019    MCV 84 04/10/2019    MCH 28.6 04/10/2019    MCHC 34.0 04/10/2019    RDW 13.2 04/10/2019     04/10/2019       BMP RESULTS:  Lab Results   Component Value Date     03/24/2019    POTASSIUM 3.6 03/25/2019    CHLORIDE 102 03/24/2019    CO2 29 03/24/2019    ANIONGAP 6 03/24/2019     (H) 03/24/2019    BUN 7 03/24/2019    CR 0.86 03/24/2019    GFRESTIMATED >90 03/24/2019    GFRESTBLACK >90 03/24/2019    BRIAN 8.6 03/24/2019        A1C RESULTS:  Lab Results   Component Value Date    A1C 5.6 03/28/2019         Procedures:  EKG: sin rhythm     Assessment and Plan:     We discussed the results with patient:  We discussed the importance of a heart healthy diet and lifestyle.  We discussed the following therapeutic changes:    Medication Changes: Coupon printed from [employer]  - Stop Gemfibrozil  - Start Fenofibrate 145 mg twice daily.  For the first 3 weeks start with 145 mg every day 1 tablet in the morning and 1/2 tablet in the evening - if patient tolerates increase the evening dose also to 145 mg/d.  - Start Lovaza/Vascepa 2 G twice daily.     Recommendations:  Complete fasting labs in 6 weeks at your local [place] lab.     Please follow up: With Dr. Hernandez based on results of labs.      Carlo Hernandez MD, PhD  Professor of Medicine  Division of Cardiology    CC  Patient Care Team:  No Ref-Primary, Physician as PCP - Lenora Dubose, GRANT CNP as Assigned PCP  SELF, REFERRED

## 2019-06-03 NOTE — LETTER
6/3/2019      RE: Booker Quintana  [address] [facility] [address]       Dear Colleague,    Thank you for the opportunity to participate in the care of your patient, Booker Quintana, at the [facility]. Please see a copy of my visit note below.    HPI:     I had the privilege to evaluate and examine Mr Booker Quintana, who is a 33 yr old male patient with a Hx of a recent hospitalization with acute onset of abdominal pain.  With following history:   Woke up with mild low back pain yesterday morning, initially thought it was pain from moving couch as the day before.  After lunch the pain migrated around to his mid abdomen and from there extended to the right lower quadrant and epigastrium.  Pain continued to worsen throughout the day.    Denied fever, chills, nausea, vomiting, black/bloody stools.  Denied any similar previous abdominal pain.  Denied history of pancreatitis.  Acknowledged occasional alcohol use but states it is no more than 3-5 drinks per month.    He did have two double rum and Cokes the day before admission.     He also acknowledges a long-standing history of severe heartburn for which he takes daily OTC omeprazole, ranitidine, and Tums.  He has never been worked up for this.     Denied chest pain, palpitations, dyspnea, cough, fevers, chills.     Pt reports that he has quit smoking. His smoking use included cigarettes. He smoked 0.10 packs per day. He has never used smokeless tobacco.      Patient comes for further therapy because of hypertriglyceridemia.  Paient realizes that he can do much better with his diet.    PAST MEDICAL HISTORY:  Past Medical History:   Diagnosis Date     Pancreatitis      Scheuermann disease        CURRENT MEDICATIONS:  Current Outpatient Medications   Medication Sig Dispense Refill     Bioflavonoid Products (VITAMIN C PLUS PO)        gemfibrozil (LOPID) 600 MG tablet Take 1 tablet (600 mg) by mouth  2 times daily (before meals) 180 tablet 1     pantoprazole (PROTONIX) 40 MG EC tablet Take 1 tablet (40 mg) by mouth 2 times daily 180 tablet 1     acetaminophen (TYLENOL) 500 MG tablet Take 500-1,000 mg by mouth every 6 hours as needed for mild pain         PAST SURGICAL HISTORY:  Past Surgical History:   Procedure Laterality Date     ESOPHAGOSCOPY, GASTROSCOPY, DUODENOSCOPY (EGD), COMBINED N/A 4/29/2019    Procedure: ESOPHAGOGASTRODUODENOSCOPY, WITH BIOPSY;  Surgeon: Jey Guevara MD;  Location: [facility]       ALLERGIES     Allergies   Allergen Reactions     Oxycodone Nausea       FAMILY HISTORY:  Family History   Problem Relation Age of Onset     Diabetes Maternal Great-Grandmother      Diabetes Maternal Uncle      Diabetes Paternal Grandfather      Hyperlipidemia Paternal Grandfather      Arthritis Mother      Hypertension Father      Hyperlipidemia Father      Arthritis Father      Substance Abuse Father        SOCIAL HISTORY:  Social History     Socioeconomic History     Marital status: Single     Spouse name: None     Number of children: None     Years of education: None     Highest education level: None   Occupational History     None   Social Needs     Financial resource strain: None     Food insecurity:     Worry: None     Inability: None     Transportation needs:     Medical: None     Non-medical: None   Tobacco Use     Smoking status: Former Smoker     Packs/day: 0.10     Types: Cigarettes     Smokeless tobacco: Never Used   Substance and Sexual Activity     Alcohol use: Yes     Frequency: 2-4 times a month     Comment: rare     Drug use: No     Sexual activity: Yes     Partners: Female     Birth control/protection: Female Surgical   Lifestyle     Physical activity:     Days per week: None     Minutes per session: None     Stress: None   Relationships     Social connections:     Talks on phone: None     Gets together: None     Attends Lutheran service: None     Active member of club or organization: None  "    Attends meetings of clubs or organizations: None     Relationship status: None     Intimate partner violence:     Fear of current or ex partner: None     Emotionally abused: None     Physically abused: None     Forced sexual activity: None   Other Topics Concern     None   Social History Narrative     None       ROS:   Constitutional: No fever, chills, or sweats. No weight gain/loss   ENT: No visual disturbance, ear ache, epistaxis, sore throat  Allergies/Immunologic: Negative.   Respiratory: No cough, hemoptysia  Cardiovascular: As per HPI  GI: No nausea, vomiting, hematemesis, melena, or hematochezia  : No urinary frequency, dysuria, or hematuria  Integument: Negative  Psychiatric: Negative  Neuro: Negative  Endocrinology: Negative   Musculoskeletal: Negative    EXAM:  /88 (BP Location: Right arm, Patient Position: Chair, Cuff Size: Adult Large)   Pulse 75   Ht 1.753 m (5' 9\")   Wt 110.4 kg (243 lb 4.8 oz)   SpO2 97%   BMI 35.93 kg/m     In general, the patient is a pleasant male in no apparent distress.    HEENT: NC/AT.  PERRLA.  EOMI.  Sclerae white, not injected.  Nares clear.  Pharynx without erythema or exudate.  Dentition intact.    Neck: No adenopathy.  No thyromegaly. Carotids +4/4 bilaterally without bruits.  No jugular venous distension.   Heart: RRR. Normal S1, S2 splits physiologically. No murmur, rub, click, or gallop. The PMI is in the 5th ICS in the midclavicular line. There is no heave.    Lungs: CTA.  No ronchi, wheezes, rales.  No dullness to percussion.   Abdomen: Soft, nontender, nondistended. No organomegaly.  No bruits.   Extremities: No clubbing, cyanosis, or edema.  The pulses are +4/4 at the radial, brachial, femoral, popliteal, DP, and PT sites bilaterally.  No bruits are noted.  Neurologic: Alert and oriented to person/place/time, normal speech, gait and affect  Skin: No petechiae, purpura or rash.    Labs:  LIPID RESULTS:  Lab Results   Component Value Date    CHOL 208 " (H) 05/22/2019    HDL 15 (L) 05/22/2019    LDL  05/22/2019     Cannot estimate LDL when triglyceride exceeds 400 mg/dL    LDL 57 05/22/2019    TRIG 2,892 (H) 05/22/2019    NHDL 193 (H) 05/22/2019       LIVER ENZYME RESULTS:  Lab Results   Component Value Date    AST 24 03/23/2019    ALT 45 03/23/2019       CBC RESULTS:  Lab Results   Component Value Date    WBC 6.8 04/10/2019    RBC 5.04 04/10/2019    HGB 14.4 04/10/2019    HCT 42.4 04/10/2019    MCV 84 04/10/2019    MCH 28.6 04/10/2019    MCHC 34.0 04/10/2019    RDW 13.2 04/10/2019     04/10/2019       BMP RESULTS:  Lab Results   Component Value Date     03/24/2019    POTASSIUM 3.6 03/25/2019    CHLORIDE 102 03/24/2019    CO2 29 03/24/2019    ANIONGAP 6 03/24/2019     (H) 03/24/2019    BUN 7 03/24/2019    CR 0.86 03/24/2019    GFRESTIMATED >90 03/24/2019    GFRESTBLACK >90 03/24/2019    BRIAN 8.6 03/24/2019        A1C RESULTS:  Lab Results   Component Value Date    A1C 5.6 03/28/2019         Procedures:  EKG: sin rhythm     Assessment and Plan:     We discussed the results with patient:  We discussed the importance of a heart healthy diet and lifestyle.  We discussed the following therapeutic changes:    Medication Changes: Coupon printed from [employer]  - Stop Gemfibrozil  - Start Fenofibrate 145 mg twice daily.  For the first 3 weeks start with 145 mg every day 1 tablet in the morning and 1/2 tablet in the evening - if patient tolerates increase the evening dose also to 145 mg/d.  - Start Lovaza/Vascepa 2 G twice daily.     Recommendations:  Complete fasting labs in 6 weeks at your local [place] lab.     Please follow up: With Dr. Hernandez based on results of labs.      Carlo Hernandez MD, PhD  Professor of Medicine  Division of Cardiology    CC  Patient Care Team:  No Ref-Primary, Physician as PCP - Lenora Dubose APRN CNP as Assigned PCP  SELF, REFERRED

## 2019-06-04 LAB — INTERPRETATION ECG - MUSE: NORMAL

## 2019-06-13 ENCOUNTER — MYC REFILL (OUTPATIENT)
Dept: FAMILY MEDICINE | Facility: CLINIC | Age: 34
End: 2019-06-13

## 2019-06-13 DIAGNOSIS — K85.10 ACUTE BILIARY PANCREATITIS, UNSPECIFIED COMPLICATION STATUS: ICD-10-CM

## 2019-06-13 RX ORDER — PANTOPRAZOLE SODIUM 40 MG/1
40 TABLET, DELAYED RELEASE ORAL
Qty: 180 TABLET | Refills: 1 | Status: CANCELLED | OUTPATIENT
Start: 2019-06-13

## 2019-07-01 ENCOUNTER — OFFICE VISIT (OUTPATIENT)
Dept: FAMILY MEDICINE | Facility: CLINIC | Age: 34
End: 2019-07-01

## 2019-07-01 VITALS
OXYGEN SATURATION: 96 % | DIASTOLIC BLOOD PRESSURE: 80 MMHG | SYSTOLIC BLOOD PRESSURE: 104 MMHG | BODY MASS INDEX: 35.99 KG/M2 | RESPIRATION RATE: 16 BRPM | WEIGHT: 243 LBS | TEMPERATURE: 99.2 F | HEART RATE: 80 BPM | HEIGHT: 69 IN

## 2019-07-01 DIAGNOSIS — R53.83 OTHER FATIGUE: Primary | ICD-10-CM

## 2019-07-01 DIAGNOSIS — Z11.4 SCREENING FOR HIV (HUMAN IMMUNODEFICIENCY VIRUS): ICD-10-CM

## 2019-07-01 DIAGNOSIS — E78.1 HYPERTRIGLYCERIDEMIA: ICD-10-CM

## 2019-07-01 DIAGNOSIS — R68.82 DECREASED LIBIDO: ICD-10-CM

## 2019-07-01 DIAGNOSIS — E66.01 MORBID OBESITY (H): ICD-10-CM

## 2019-07-01 LAB
CHOLEST SERPL-MCNC: 199 MG/DL
HDLC SERPL-MCNC: 27 MG/DL
LDLC SERPL CALC-MCNC: ABNORMAL MG/DL
NONHDLC SERPL-MCNC: 172 MG/DL
TRIGL SERPL-MCNC: 1071 MG/DL
TSH SERPL DL<=0.005 MIU/L-ACNC: 2.34 MU/L (ref 0.4–4)

## 2019-07-01 PROCEDURE — 36415 COLL VENOUS BLD VENIPUNCTURE: CPT | Performed by: NURSE PRACTITIONER

## 2019-07-01 PROCEDURE — 84443 ASSAY THYROID STIM HORMONE: CPT | Performed by: NURSE PRACTITIONER

## 2019-07-01 PROCEDURE — 99214 OFFICE O/P EST MOD 30 MIN: CPT | Performed by: NURSE PRACTITIONER

## 2019-07-01 PROCEDURE — 84270 ASSAY OF SEX HORMONE GLOBUL: CPT | Performed by: NURSE PRACTITIONER

## 2019-07-01 PROCEDURE — 80061 LIPID PANEL: CPT | Performed by: NURSE PRACTITIONER

## 2019-07-01 PROCEDURE — 87389 HIV-1 AG W/HIV-1&-2 AB AG IA: CPT | Performed by: NURSE PRACTITIONER

## 2019-07-01 PROCEDURE — 82306 VITAMIN D 25 HYDROXY: CPT | Performed by: NURSE PRACTITIONER

## 2019-07-01 PROCEDURE — 84403 ASSAY OF TOTAL TESTOSTERONE: CPT | Performed by: NURSE PRACTITIONER

## 2019-07-01 ASSESSMENT — MIFFLIN-ST. JEOR: SCORE: 2042.62

## 2019-07-01 NOTE — PROGRESS NOTES
"Subjective     Booker Quintana is a 33 year old male who presents to clinic today for the following health issues:    HPI   Chief Complaint   Patient presents with     RECHECK     Pancreatitis        He has seen cardiology for his hypertriglyceridemia and is now on new medication, Tricor and increase in omega acids.  He has future labs ordered by him but he really wishes to \"see what his triglycerides are doing\" today.  He also has fatigue, he reports always feeing tired. He was to see sleep medicine but hasn't as of yet. Wife reports he snores.  He has also gained weight and reports no libido. He is  and happy. He can ejaculate and maintain erection.      Patient Active Problem List   Diagnosis     Acute pancreatitis due to hypertriglyceridemia     Chronic GERD     Hypertriglyceridemia     Obesity (BMI 35.0-39.9) with comorbidity (H)     Past Surgical History:   Procedure Laterality Date     ESOPHAGOSCOPY, GASTROSCOPY, DUODENOSCOPY (EGD), COMBINED N/A 4/29/2019    Procedure: ESOPHAGOGASTRODUODENOSCOPY, WITH BIOPSY;  Surgeon: Jey Guevara MD;  Location: [facility]       Social History     Tobacco Use     Smoking status: Former Smoker     Packs/day: 0.10     Types: Cigarettes     Smokeless tobacco: Never Used   Substance Use Topics     Alcohol use: Yes     Frequency: 2-4 times a month     Comment: rare     Family History   Problem Relation Age of Onset     Diabetes Maternal Great-Grandmother      Diabetes Maternal Uncle      Diabetes Paternal Grandfather      Hyperlipidemia Paternal Grandfather      Arthritis Mother      Hypertension Father      Hyperlipidemia Father      Arthritis Father      Substance Abuse Father          Current Outpatient Medications   Medication Sig Dispense Refill     Bioflavonoid Products (VITAMIN C PLUS PO)        fenofibrate (TRICOR) 145 MG tablet Take 1 tablet (145 mg) by mouth 2 times daily 180 tablet 3     omega-3 acid ethyl esters (LOVAZA) 1 g capsule Take 2 capsules (2 g) by mouth " 2 times daily 360 capsule 3     pantoprazole (PROTONIX) 40 MG EC tablet Take 1 tablet (40 mg) by mouth 2 times daily 180 tablet 1     acetaminophen (TYLENOL) 500 MG tablet Take 500-1,000 mg by mouth every 6 hours as needed for mild pain       Allergies   Allergen Reactions     Oxycodone Nausea     Recent Labs   Lab Test 05/22/19  0957 04/10/19  1104 03/28/19  1328 03/25/19  0541 03/24/19  1927 03/24/19  0512  03/23/19  0541  03/22/19  0456  03/21/19  0451  03/20/19  1247 03/20/19  0506   A1C  --   --  5.6  --   --   --   --   --   --   --   --   --   --   --  5.7*   LDL Cannot estimate LDL when triglyceride exceeds 400 mg/dL  57 Cannot estimate LDL when triglyceride exceeds 400 mg/dL  --   --   --   --   --   --   --   --   --   --   --   --  Cannot estimate LDL when triglyceride exceeds 400 mg/dL   HDL 15* 23*  --   --   --   --   --   --   --   --   --   --   --   --  17*   TRIG 2,892* 476* 301* 337* 466* 430*   < > 578*   < > 684*   < > 1,263*   < >  --  4,463*   ALT  --   --   --   --   --   --   --  45  --  42  --  57  --   --  79*   CR  --   --   --   --   --  0.86  --  0.85  --  0.82  --  0.80  Canceled, Test credited  --   --  0.80   GFRESTIMATED  --   --   --   --   --  >90  --  >90  --  >90  --  >90  Canceled, Test credited  --   --  >90   GFRESTBLACK  --   --   --   --   --  >90  --  >90  --  >90  --  >90  Canceled, Test credited  --   --  >90   POTASSIUM  --   --   --  3.6 3.4 3.2*  --  3.4  --  3.4  --  3.7  Canceled, Test credited  --   --  3.9   TSH  --   --   --   --   --   --   --   --   --   --   --   --   --  2.08  --     < > = values in this interval not displayed.      BP Readings from Last 3 Encounters:   07/01/19 104/80   06/03/19 130/88   05/08/19 118/80    Wt Readings from Last 3 Encounters:   07/01/19 110.2 kg (243 lb)   06/03/19 110.4 kg (243 lb 4.8 oz)   05/08/19 106.6 kg (235 lb)                    Reviewed and updated as needed this visit by Provider         Review of Systems   ROS  "COMP: Constitutional, HEENT, cardiovascular, pulmonary, GI, , musculoskeletal, neuro, skin, endocrine and psych systems are negative, except as otherwise noted.      Objective    There were no vitals taken for this visit.  There is no height or weight on file to calculate BMI.  Physical Exam   GENERAL: healthy, alert and no distress  HENT: pharynx without erythema  NECK: no adenopathy, no asymmetry  RESP: lungs clear to auscultation - no rales, rhonchi or wheezes  CV: regular rate and rhythm, normal S1 S2, no S3 or S4, no murmur  ABDOMEN: soft, nontender, no hepatosplenomegaly, no masses and bowel sounds normal  MS: no gross musculoskeletal defects noted      Diagnostic Test Results:  Labs reviewed in Epic  No results found for this or any previous visit (from the past 24 hour(s)).        Assessment & Plan     1. Other fatigue    - TSH with free T4 reflex  - SLEEP EVALUATION & MANAGEMENT REFERRAL - [facility]  [phone] (Age 2 and up); Future  - Vitamin D Deficiency    2. Morbid obesity (H)      3. Decreased libido    - Testosterone Bioavailable    4. Hypertriglyceridemia    - Lipid Profile (Chol, Trig, HDL, LDL calc)  Continue follow up with cardiology.    5. Screening for HIV (human immunodeficiency virus)    - HIV Antigen Antibody Combo     BMI:   Estimated body mass index is 35.88 kg/m  as calculated from the following:    Height as of this encounter: 1.753 m (5' 9\").    Weight as of this encounter: 110.2 kg (243 lb).           See Patient Instructions  Patient Instructions   Call to schedule sleep medicine evaluation.  Will be notified of pending labs.  Return fasting for labs ordered by Dr. Hernandez.      Our Clinic hours are:  Mondays    7:20 am - 7 pm  Tues -  Fri  7:20 am - 5 pm    Clinic Phone: [phone]    The clinic lab opens at 7:30 am Mon - Fri and appointments are required.    [facility]. [phone]  Monday  8 am - 7pm  Tues - Fri 8 am - " 5:30 pm             Return for or sooner if symptoms persist or worsen, Lab Work.    GRANT Fajardo [facility]

## 2019-07-01 NOTE — PATIENT INSTRUCTIONS
Call to schedule sleep medicine evaluation.  Will be notified of pending labs.  Return fasting for labs ordered by Dr. Hernandez.      Our Clinic hours are:  Mondays    7:20 am - 7 pm  Tues -  Fri  7:20 am - 5 pm    Clinic Phone: [phone]    The clinic lab opens at 7:30 am Mon - Fri and appointments are required.    [facility]. [phone]  Monday  8 am - 7pm  Tues - Fri 8 am - 5:30 pm

## 2019-07-02 LAB
DEPRECATED CALCIDIOL+CALCIFEROL SERPL-MC: 25 UG/L (ref 20–75)
HIV 1+2 AB+HIV1 P24 AG SERPL QL IA: NONREACTIVE

## 2019-07-03 LAB
SHBG SERPL-SCNC: 14 NMOL/L (ref 11–80)
TESTOST FREE SERPL-MCNC: 7.61 NG/DL (ref 4.7–24.4)
TESTOST SERPL-MCNC: 263 NG/DL (ref 240–950)

## 2019-07-17 DIAGNOSIS — E78.1 HYPERTRIGLYCERIDEMIA: ICD-10-CM

## 2019-07-17 LAB
ALBUMIN SERPL-MCNC: 4.1 G/DL (ref 3.4–5)
ALP SERPL-CCNC: 54 U/L (ref 40–150)
ALT SERPL W P-5'-P-CCNC: 63 U/L (ref 0–70)
ANION GAP SERPL CALCULATED.3IONS-SCNC: 6 MMOL/L (ref 3–14)
AST SERPL W P-5'-P-CCNC: 29 U/L (ref 0–45)
BILIRUB SERPL-MCNC: 0.5 MG/DL (ref 0.2–1.3)
BUN SERPL-MCNC: 22 MG/DL (ref 7–30)
CALCIUM SERPL-MCNC: 9.1 MG/DL (ref 8.5–10.1)
CHLORIDE SERPL-SCNC: 106 MMOL/L (ref 94–109)
CHOLEST SERPL-MCNC: 196 MG/DL
CO2 SERPL-SCNC: 27 MMOL/L (ref 20–32)
CREAT SERPL-MCNC: 0.9 MG/DL (ref 0.66–1.25)
GFR SERPL CREATININE-BSD FRML MDRD: >90 ML/MIN/{1.73_M2}
GLUCOSE SERPL-MCNC: 114 MG/DL (ref 70–99)
HDLC SERPL-MCNC: 34 MG/DL
LDLC SERPL CALC-MCNC: 82 MG/DL
NONHDLC SERPL-MCNC: 162 MG/DL
POTASSIUM SERPL-SCNC: 4.2 MMOL/L (ref 3.4–5.3)
PROT SERPL-MCNC: 7.3 G/DL (ref 6.8–8.8)
SODIUM SERPL-SCNC: 139 MMOL/L (ref 133–144)
TRIGL SERPL-MCNC: 399 MG/DL

## 2019-07-17 PROCEDURE — 80053 COMPREHEN METABOLIC PANEL: CPT | Performed by: INTERNAL MEDICINE

## 2019-07-17 PROCEDURE — 80061 LIPID PANEL: CPT | Performed by: INTERNAL MEDICINE

## 2019-07-17 PROCEDURE — 36415 COLL VENOUS BLD VENIPUNCTURE: CPT | Performed by: INTERNAL MEDICINE

## 2019-08-07 ENCOUNTER — MYC MEDICAL ADVICE (OUTPATIENT)
Dept: FAMILY MEDICINE | Facility: CLINIC | Age: 34
End: 2019-08-07

## 2019-08-07 ENCOUNTER — MYC REFILL (OUTPATIENT)
Dept: FAMILY MEDICINE | Facility: CLINIC | Age: 34
End: 2019-08-07

## 2019-08-07 DIAGNOSIS — K85.10 ACUTE BILIARY PANCREATITIS, UNSPECIFIED COMPLICATION STATUS: ICD-10-CM

## 2019-08-07 RX ORDER — PANTOPRAZOLE SODIUM 40 MG/1
40 TABLET, DELAYED RELEASE ORAL
Qty: 180 TABLET | Refills: 1 | Status: CANCELLED | OUTPATIENT
Start: 2019-08-07

## 2019-08-08 NOTE — TELEPHONE ENCOUNTER
I don't see any documentation of this discussion. This is a very high dose. I would recommend holding this for his PCP Lenora Cobian to address as she will be in tomorrow. If GERD not well controlled on standard dose may need additional work-up or GI consult.

## 2019-08-08 NOTE — TELEPHONE ENCOUNTER
Covering provider,   Patient is requesting this refill today please.    Routing refill request to provider for review/approval because:  Patient reports he is taking more than sig - this was discussed however RN does not see documentation regarding this.    He is taking 80mg BID.  Sig is 40mg BID.    Are you ok with this?  Routing to provider.  Betty REID RN

## 2019-08-09 RX ORDER — PANTOPRAZOLE SODIUM 40 MG/1
80 TABLET, DELAYED RELEASE ORAL
Qty: 360 TABLET | Refills: 1 | Status: SHIPPED | OUTPATIENT
Start: 2019-08-09 | End: 2019-09-03

## 2019-09-03 ENCOUNTER — OFFICE VISIT (OUTPATIENT)
Dept: FAMILY MEDICINE | Facility: CLINIC | Age: 34
End: 2019-09-03

## 2019-09-03 ENCOUNTER — ANCILLARY PROCEDURE (OUTPATIENT)
Dept: GENERAL RADIOLOGY | Facility: CLINIC | Age: 34
End: 2019-09-03
Attending: NURSE PRACTITIONER

## 2019-09-03 VITALS
DIASTOLIC BLOOD PRESSURE: 70 MMHG | TEMPERATURE: 97.9 F | OXYGEN SATURATION: 100 % | WEIGHT: 244 LBS | HEIGHT: 69 IN | HEART RATE: 80 BPM | RESPIRATION RATE: 16 BRPM | BODY MASS INDEX: 36.14 KG/M2 | SYSTOLIC BLOOD PRESSURE: 120 MMHG

## 2019-09-03 DIAGNOSIS — G47.00 INSOMNIA, UNSPECIFIED TYPE: ICD-10-CM

## 2019-09-03 DIAGNOSIS — M25.512 LEFT SHOULDER PAIN, UNSPECIFIED CHRONICITY: ICD-10-CM

## 2019-09-03 DIAGNOSIS — R06.83 SNORING: ICD-10-CM

## 2019-09-03 DIAGNOSIS — R59.1 LYMPHADENOPATHY: ICD-10-CM

## 2019-09-03 DIAGNOSIS — K85.10 ACUTE BILIARY PANCREATITIS, UNSPECIFIED COMPLICATION STATUS: Primary | ICD-10-CM

## 2019-09-03 DIAGNOSIS — R40.0 DAYTIME SLEEPINESS: ICD-10-CM

## 2019-09-03 PROCEDURE — 99214 OFFICE O/P EST MOD 30 MIN: CPT | Performed by: NURSE PRACTITIONER

## 2019-09-03 PROCEDURE — 73030 X-RAY EXAM OF SHOULDER: CPT | Mod: LT

## 2019-09-03 RX ORDER — PANTOPRAZOLE SODIUM 40 MG/1
40 TABLET, DELAYED RELEASE ORAL
Qty: 360 TABLET | Refills: 1 | COMMUNITY
Start: 2019-09-03 | End: 2020-01-13

## 2019-09-03 ASSESSMENT — MIFFLIN-ST. JEOR: SCORE: 2047.16

## 2019-09-03 NOTE — PATIENT INSTRUCTIONS
Will be notified of pending x ray of shoulder, and will consider PT.  Call to schedule with sleep medicine.  Continue to monitor lymph node.  Decrease Protonix to one tablet twice a day, watch diet.      Our Clinic hours are:  Mondays    7:20 am - 7 pm  Tues -  Fri  7:20 am - 5 pm    Clinic Phone: [phone]    The clinic lab opens at 7:30 am Mon - Fri and appointments are required.    [facility]  Ph. [phone]  Monday  8 am - 7pm  Tues - Fri 8 am - 5:30 pm

## 2019-09-03 NOTE — PROGRESS NOTES
Subjective     Booker Quintana is a 33 year old male who presents to clinic today for the following health issues:    HPI   Pt is here for follow up pancreatitis. He has noticed yesterday a lump to his right neck.  He has been taking 2 Protonix twice daily. States if he misses a dose, he gets heart burn. He's been trying to adjust diet after having pancreatitis. States tomatoes are bothersome to him. He had an EGD, see results below; it was normal.   He continues to complain of bilateral shoulder pain. See x ray of left shoulder below. He works in construction. Denies trauma.   He has a lump on right posterior neck.  He complains of chronic fatigue, TSH was normal. States he snores, cannot sleep well.    Impression:          - Normal nasopharynx and oropharynx.                        - Gastroesophageal junction polyp(s) were found.                        Resected and retrieved.                        - Normal stomach.                        - Normal examined duodenum.   Recommendation:      - Discharge patient to home.                        - High fiber diet.                        - Await pathology results.                        - Return to referring physician PRN.                                                                                     XR SHOULDER LT G/E 3 VW 4/10/2019 12:15 PM     COMPARISON: None.     HISTORY: LEFT shoulder pain.                                                                      IMPRESSION: No acute fracture or dislocation seen in the LEFT  shoulder. Joint spaces appear to be preserved. There is mild  osteolysis in the distal clavicle which suggests resorption related to  recent trauma versus infectious or inflammatory process.     LIZBETH HASSAN MD  Patient Active Problem List   Diagnosis     Acute pancreatitis due to hypertriglyceridemia     Chronic GERD     Hypertriglyceridemia     Obesity (BMI 35.0-39.9) with comorbidity (H)     Past Surgical History:   Procedure Laterality  Date     ESOPHAGOSCOPY, GASTROSCOPY, DUODENOSCOPY (EGD), COMBINED N/A 4/29/2019    Procedure: ESOPHAGOGASTRODUODENOSCOPY, WITH BIOPSY;  Surgeon: Jey Guevara MD;  Location: [facility]       Social History     Tobacco Use     Smoking status: Light Tobacco Smoker     Packs/day: 0.10     Types: Cigarettes     Smokeless tobacco: Never Used   Substance Use Topics     Alcohol use: Yes     Frequency: 2-4 times a month     Comment: rare     Family History   Problem Relation Age of Onset     Diabetes Maternal Great-Grandmother      Diabetes Maternal Uncle      Diabetes Paternal Grandfather      Hyperlipidemia Paternal Grandfather      Arthritis Mother      Hypertension Father      Hyperlipidemia Father      Arthritis Father      Substance Abuse Father          Current Outpatient Medications   Medication Sig Dispense Refill     fenofibrate (TRICOR) 145 MG tablet Take 1 tablet (145 mg) by mouth 2 times daily 180 tablet 3     omega-3 acid ethyl esters (LOVAZA) 1 g capsule Take 2 capsules (2 g) by mouth 2 times daily 360 capsule 3     pantoprazole (PROTONIX) 40 MG EC tablet Take 1 tablet (40 mg) by mouth 2 times daily 360 tablet 1     Bioflavonoid Products (VITAMIN C PLUS PO)        Allergies   Allergen Reactions     Oxycodone Nausea     Recent Labs   Lab Test 07/17/19  0808 07/01/19  0959 05/22/19  0957  03/28/19  1328 03/25/19  0541  03/24/19  0512  03/23/19  0541  03/22/19  0456  03/20/19  1247 03/20/19  0506   A1C  --   --   --   --  5.6  --   --   --   --   --   --   --   --   --  5.7*   LDL 82 Cannot estimate LDL when triglyceride exceeds 400 mg/dL Cannot estimate LDL when triglyceride exceeds 400 mg/dL  57   < >  --   --   --   --   --   --   --   --   --   --  Cannot estimate LDL when triglyceride exceeds 400 mg/dL   HDL 34* 27* 15*   < >  --   --   --   --   --   --   --   --   --   --  17*   TRIG 399* 1,071* 2,892*   < > 301* 337*   < > 430*   < > 578*   < > 684*   < >  --  4,463*   ALT 63  --   --   --   --   --   --    "--   --  45  --  42   < >  --  79*   CR 0.90  --   --   --   --   --   --  0.86  --  0.85  --  0.82   < >  --  0.80   GFRESTIMATED >90  --   --   --   --   --   --  >90  --  >90  --  >90   < >  --  >90   GFRESTBLACK >90  --   --   --   --   --   --  >90  --  >90  --  >90   < >  --  >90   POTASSIUM 4.2  --   --   --   --  3.6   < > 3.2*  --  3.4  --  3.4   < >  --  3.9   TSH  --  2.34  --   --   --   --   --   --   --   --   --   --   --  2.08  --     < > = values in this interval not displayed.      BP Readings from Last 3 Encounters:   09/03/19 120/70   07/01/19 104/80   06/03/19 130/88    Wt Readings from Last 3 Encounters:   09/03/19 110.7 kg (244 lb)   07/01/19 110.2 kg (243 lb)   06/03/19 110.4 kg (243 lb 4.8 oz)                      Reviewed and updated as needed this visit by Provider         Review of Systems   ROS COMP: Constitutional, HEENT, cardiovascular, pulmonary, gi and gu systems are negative, except as otherwise noted.      Objective    /70 (BP Location: Right arm, Patient Position: Chair, Cuff Size: Adult Large)   Pulse 80   Temp 97.9  F (36.6  C) (Oral)   Resp 16   Ht 1.753 m (5' 9\")   Wt 110.7 kg (244 lb)   SpO2 100%   BMI 36.03 kg/m    Body mass index is 36.03 kg/m .  Physical Exam   GENERAL: healthy, alert and no distress  HENT: ear canals and TM's pink, pharynx without erythema  NECK: mild posterior right adenopathy  RESP: lungs clear to auscultation - no rales, rhonchi or wheezes  CV: regular rate and rhythm, normal S1 S2, no S3 or S4, no murmur  ABDOMEN: soft, nontender, no hepatosplenomegaly, no masses and bowel sounds normal  MS: no gross musculoskeletal defects noted      Diagnostic Test Results:  Labs reviewed in Epic  No results found for this or any previous visit (from the past 24 hour(s)).        Assessment & Plan     1. Acute biliary pancreatitis, unspecified complication status    - pantoprazole (PROTONIX) 40 MG EC tablet; Take 1 tablet (40 mg) by mouth 2 times daily  " "Dispense: 360 tablet; Refill: 1  Aware of risks of long term use of PPI and appropriate dose. Will decrease and address dietary intake.    2. Left shoulder pain, unspecified chronicity    - XR Shoulder Left G/E 3 Views; Future  Repeat X ray, consider orthopedics or PT.    3. Lymphadenopathy    Continue to monitor.    4. Insomnia, unspecified type    - SLEEP EVALUATION & MANAGEMENT REFERRAL - [facility]  [phone] (Age 2 and up); Future    5. Snoring    - SLEEP EVALUATION & MANAGEMENT REFERRAL - [facility]  [phone] (Age 2 and up); Future    6. Daytime sleepiness    - SLEEP EVALUATION & MANAGEMENT REFERRAL - [facility]  [phone] (Age 2 and up); Future     Tobacco Cessation:   reports that he has been smoking cigarettes.  He has been smoking about 0.10 packs per day. He has never used smokeless tobacco.        BMI:   Estimated body mass index is 36.03 kg/m  as calculated from the following:    Height as of this encounter: 1.753 m (5' 9\").    Weight as of this encounter: 110.7 kg (244 lb).           See Patient Instructions  Patient Instructions   Will be notified of pending x ray of shoulder, and will consider PT.  Call to schedule with sleep medicine.  Continue to monitor lymph node.  Decrease Protonix to one tablet twice a day, watch diet.      Our Clinic hours are:  Mondays    7:20 am - 7 pm  Tues -  Fri  7:20 am - 5 pm    Clinic Phone: [phone]    The clinic lab opens at 7:30 am Mon - Fri and appointments are required.    [facility] [place]  Ph. [phone]  Monday  8 am - 7pm  Tues - Fri 8 am - 5:30 pm             Return in about 4 weeks (around 10/1/2019) for or sooner if symptoms persist or worsen, Lab Work, Routine Visit.    GRANT Fajardo [facility]      "

## 2019-11-03 ENCOUNTER — HEALTH MAINTENANCE LETTER (OUTPATIENT)
Age: 34
End: 2019-11-03

## 2020-01-13 ENCOUNTER — MYC REFILL (OUTPATIENT)
Dept: FAMILY MEDICINE | Facility: CLINIC | Age: 35
End: 2020-01-13

## 2020-01-13 DIAGNOSIS — K85.10 ACUTE BILIARY PANCREATITIS, UNSPECIFIED COMPLICATION STATUS: ICD-10-CM

## 2020-01-14 RX ORDER — PANTOPRAZOLE SODIUM 40 MG/1
40 TABLET, DELAYED RELEASE ORAL
Qty: 360 TABLET | Refills: 1 | Status: SHIPPED | OUTPATIENT
Start: 2020-01-14 | End: 2020-12-14

## 2020-01-14 NOTE — TELEPHONE ENCOUNTER
Routing refill request to provider for review/approval because:  Medication is reported/historical    Lenora Abraham RN

## 2020-01-20 ENCOUNTER — OFFICE VISIT (OUTPATIENT)
Dept: FAMILY MEDICINE | Facility: CLINIC | Age: 35
End: 2020-01-20

## 2020-01-20 VITALS
HEART RATE: 88 BPM | HEIGHT: 69 IN | BODY MASS INDEX: 34.66 KG/M2 | TEMPERATURE: 99 F | WEIGHT: 234 LBS | DIASTOLIC BLOOD PRESSURE: 70 MMHG | OXYGEN SATURATION: 96 % | SYSTOLIC BLOOD PRESSURE: 128 MMHG

## 2020-01-20 DIAGNOSIS — Z13.1 SCREENING FOR DIABETES MELLITUS: ICD-10-CM

## 2020-01-20 DIAGNOSIS — E78.1 HYPERTRIGLYCERIDEMIA: Primary | ICD-10-CM

## 2020-01-20 DIAGNOSIS — J06.9 VIRAL URI: ICD-10-CM

## 2020-01-20 PROCEDURE — 99214 OFFICE O/P EST MOD 30 MIN: CPT | Performed by: NURSE PRACTITIONER

## 2020-01-20 ASSESSMENT — PAIN SCALES - GENERAL: PAINLEVEL: NO PAIN (0)

## 2020-01-20 ASSESSMENT — MIFFLIN-ST. JEOR: SCORE: 1996.8

## 2020-01-20 NOTE — PROGRESS NOTES
Subjective     Booker Quintana is a 34 year old male who presents to clinic today for the following health issues:    HPI     Patient is here today to discuss about getting his labs rechecked.     ENT Symptoms             Symptoms: cc Present Absent Comment   Fever/Chills  x     Fatigue  x     Muscle Aches   x    Eye Irritation   x    Sneezing  x     Nasal Shaji/Drg  x     Sinus Pressure/Pain  x     Loss of smell   x    Dental pain  x  Mild    Sore Throat   x    Swollen Glands   x    Ear Pain/Fullness   x    Cough  x     Wheeze   x    Chest Pain   x    Shortness of breath   x    Rash   x    Other   x      Symptom duration:  2-3 days    Symptom severity:  moderate    Treatments tried:  Nyquil, Dayquil, Tylenol    Contacts:  Yes     He wanted to get cholesterol numbers re checked but he has eaten. Will return for that.  He is on Tricor and Fish oil. He saw cardiology and they wanted to see him yearly, he's wondering if he needs to.  Denies any pain but does get heart burn if he doesn't take 2 of the Protonix daily. He also admits he's slipped back into his old, bad habits with eating poorly. Drinks alcohol maybe every other week.  No flu shot.      Patient Active Problem List   Diagnosis     Acute pancreatitis due to hypertriglyceridemia     Chronic GERD     Hypertriglyceridemia     Obesity (BMI 35.0-39.9) with comorbidity (H)     Past Surgical History:   Procedure Laterality Date     ESOPHAGOSCOPY, GASTROSCOPY, DUODENOSCOPY (EGD), COMBINED N/A 4/29/2019    Procedure: ESOPHAGOGASTRODUODENOSCOPY, WITH BIOPSY;  Surgeon: Jey Guevara MD;  Location: [facility]       Social History     Tobacco Use     Smoking status: Light Tobacco Smoker     Packs/day: 0.10     Types: Cigarettes     Smokeless tobacco: Never Used   Substance Use Topics     Alcohol use: Yes     Frequency: 2-4 times a month     Comment: rare     Family History   Problem Relation Age of Onset     Diabetes Maternal Great-Grandmother      Diabetes Maternal Uncle   "    Diabetes Paternal Grandfather      Hyperlipidemia Paternal Grandfather      Arthritis Mother      Hypertension Father      Hyperlipidemia Father      Arthritis Father      Substance Abuse Father          Current Outpatient Medications   Medication Sig Dispense Refill     Bioflavonoid Products (VITAMIN C PLUS PO)        fenofibrate (TRICOR) 145 MG tablet Take 1 tablet (145 mg) by mouth 2 times daily 180 tablet 3     omega-3 acid ethyl esters (LOVAZA) 1 g capsule Take 2 capsules (2 g) by mouth 2 times daily 360 capsule 2     pantoprazole (PROTONIX) 40 MG EC tablet Take 1 tablet (40 mg) by mouth 2 times daily 360 tablet 1     Allergies   Allergen Reactions     Oxycodone Nausea     BP Readings from Last 3 Encounters:   01/20/20 128/70   09/03/19 120/70   07/01/19 104/80    Wt Readings from Last 3 Encounters:   01/20/20 106.1 kg (234 lb)   09/03/19 110.7 kg (244 lb)   07/01/19 110.2 kg (243 lb)                      Reviewed and updated as needed this visit by Provider         Review of Systems   ROS COMP: Constitutional, HEENT, cardiovascular, pulmonary, gi and gu systems are negative, except as otherwise noted.      Objective    Vital signs:  Temp: 99  F (37.2  C) Temp src: Tympanic BP: 128/70 Pulse: 88     SpO2: 96 %     Height: 175.3 cm (5' 9\") Weight: 106.1 kg (234 lb)  Estimated body mass index is 34.56 kg/m  as calculated from the following:    Height as of this encounter: 1.753 m (5' 9\").    Weight as of this encounter: 106.1 kg (234 lb).  Physical Exam   GENERAL: healthy, alert and no distress  HENT: ear canals and TM's normal, pharynx with mild erythema  NECK: no adenopathy, no asymmetry  RESP: lungs clear to auscultation - no rales, rhonchi or wheezes  CV: regular rate and rhythm, normal S1 S2, no S3 or S4, no murmur  MS: no gross musculoskeletal defects noted      Diagnostic Test Results:  Labs reviewed in Epic  No results found for this or any previous visit (from the past 24 hour(s)).        Assessment & " "Plan     1. Hypertriglyceridemia    - Lipid panel reflex to direct LDL Fasting; Future  Continue with same medications, will see what lipid profile is doing. Consider consult with cardiologist as recommended.    2. Screening for diabetes mellitus    - **Basic metabolic panel FUTURE 1yr; Future    3. Viral URI    Reassurance provided, continue with conservative management and watchful waiting.     Tobacco Cessation:   reports that he has been smoking cigarettes. He has been smoking about 0.10 packs per day. He has never used smokeless tobacco.        BMI:   Estimated body mass index is 34.56 kg/m  as calculated from the following:    Height as of this encounter: 1.753 m (5' 9\").    Weight as of this encounter: 106.1 kg (234 lb).           See Patient Instructions  Patient Instructions   Return fasting for labs tomorrow and will be notified of those results.  Continue same medicaitons and verify those when home.      Our Clinic hours are:  Mondays    7:20 am - 7 pm  Tues -  Fri  7:20 am - 5 pm    Clinic Phone: [phone]    The clinic lab opens at 7:30 am Mon - Fri and appointments are required.    [facility] [place]  Ph. [phone]  Monday  8 am - 7pm  Tues - Fri 8 am - 5:30 pm             Return in about 6 months (around 7/20/2020) for or sooner if symptoms persist or worsen, Lab Work, Routine Visit.    GRANT Fajardo CNP  [facility]        "

## 2020-01-20 NOTE — PATIENT INSTRUCTIONS
Return fasting for labs tomorrow and will be notified of those results.  Continue same medicaitons and verify those when home.      Our Clinic hours are:  Mondays    7:20 am - 7 pm  Tues -  Fri  7:20 am - 5 pm    Clinic Phone: [phone]    The clinic lab opens at 7:30 am Mon - Fri and appointments are required.    [facility]. [phone]  Monday  8 am - 7pm  Tues - Fri 8 am - 5:30 pm

## 2020-01-21 DIAGNOSIS — Z13.1 SCREENING FOR DIABETES MELLITUS: ICD-10-CM

## 2020-01-21 DIAGNOSIS — E78.1 HYPERTRIGLYCERIDEMIA: ICD-10-CM

## 2020-01-21 LAB
ANION GAP SERPL CALCULATED.3IONS-SCNC: 3 MMOL/L (ref 3–14)
BUN SERPL-MCNC: 15 MG/DL (ref 7–30)
CALCIUM SERPL-MCNC: 8.5 MG/DL (ref 8.5–10.1)
CHLORIDE SERPL-SCNC: 106 MMOL/L (ref 94–109)
CHOLEST SERPL-MCNC: 129 MG/DL
CO2 SERPL-SCNC: 27 MMOL/L (ref 20–32)
CREAT SERPL-MCNC: 0.8 MG/DL (ref 0.66–1.25)
GFR SERPL CREATININE-BSD FRML MDRD: >90 ML/MIN/{1.73_M2}
GLUCOSE SERPL-MCNC: 105 MG/DL (ref 70–99)
HDLC SERPL-MCNC: 21 MG/DL
LDLC SERPL CALC-MCNC: 30 MG/DL
NONHDLC SERPL-MCNC: 108 MG/DL
POTASSIUM SERPL-SCNC: 3.5 MMOL/L (ref 3.4–5.3)
SODIUM SERPL-SCNC: 136 MMOL/L (ref 133–144)
TRIGL SERPL-MCNC: 392 MG/DL

## 2020-01-21 PROCEDURE — 36415 COLL VENOUS BLD VENIPUNCTURE: CPT | Performed by: NURSE PRACTITIONER

## 2020-01-21 PROCEDURE — 80048 BASIC METABOLIC PNL TOTAL CA: CPT | Performed by: NURSE PRACTITIONER

## 2020-01-21 PROCEDURE — 80061 LIPID PANEL: CPT | Performed by: NURSE PRACTITIONER

## 2020-07-23 DIAGNOSIS — E78.1 HYPERTRIGLYCERIDEMIA: ICD-10-CM

## 2020-07-28 RX ORDER — FENOFIBRATE 145 MG/1
TABLET, COATED ORAL
Qty: 180 TABLET | Refills: 0 | Status: SHIPPED | OUTPATIENT
Start: 2020-07-28 | End: 2020-11-18

## 2020-07-28 NOTE — TELEPHONE ENCOUNTER
fenofibrate nanocrystallized 145 mg tablet    Last Written Prescription Date:  6/3/2019  Last Fill Quantity: 180,   # refills: 3  Last Office Visit : 6/3/2019  Future Office visit:  None    Routing refill request to provider for review/approval because:  Office Visit due           90 day jay sent to pharm   Clinical Coordinator notified      Bonnie Carr RN  Central Triage Red Flags/Med Refills

## 2020-09-22 ENCOUNTER — TELEPHONE (OUTPATIENT)
Dept: CARDIOLOGY | Facility: CLINIC | Age: 35
End: 2020-09-22

## 2020-09-22 NOTE — TELEPHONE ENCOUNTER
Called patient to schedule follow up with Dr. Hernandez for his med refills, over due for follow up . Needs to schedule either in person/video.

## 2020-09-29 DIAGNOSIS — E78.1 HYPERTRIGLYCERIDEMIA: ICD-10-CM

## 2020-09-29 DIAGNOSIS — K85.10 ACUTE BILIARY PANCREATITIS, UNSPECIFIED COMPLICATION STATUS: ICD-10-CM

## 2020-09-29 LAB
ALBUMIN SERPL-MCNC: 4 G/DL (ref 3.4–5)
ALP SERPL-CCNC: 51 U/L (ref 40–150)
ALT SERPL W P-5'-P-CCNC: 67 U/L (ref 0–70)
ANION GAP SERPL CALCULATED.3IONS-SCNC: 4 MMOL/L (ref 3–14)
AST SERPL W P-5'-P-CCNC: 35 U/L (ref 0–45)
BILIRUB SERPL-MCNC: 0.5 MG/DL (ref 0.2–1.3)
BUN SERPL-MCNC: 19 MG/DL (ref 7–30)
CALCIUM SERPL-MCNC: 10.4 MG/DL (ref 8.5–10.1)
CHLORIDE SERPL-SCNC: 106 MMOL/L (ref 94–109)
CHOLEST SERPL-MCNC: 166 MG/DL
CO2 SERPL-SCNC: 27 MMOL/L (ref 20–32)
CREAT SERPL-MCNC: 1.01 MG/DL (ref 0.66–1.25)
GFR SERPL CREATININE-BSD FRML MDRD: >90 ML/MIN/{1.73_M2}
GLUCOSE SERPL-MCNC: 127 MG/DL (ref 70–99)
HDLC SERPL-MCNC: 23 MG/DL
LDLC SERPL CALC-MCNC: ABNORMAL MG/DL
LDLC SERPL DIRECT ASSAY-MCNC: 75 MG/DL
NONHDLC SERPL-MCNC: 143 MG/DL
POTASSIUM SERPL-SCNC: 3.9 MMOL/L (ref 3.4–5.3)
PROT SERPL-MCNC: 7.4 G/DL (ref 6.8–8.8)
SODIUM SERPL-SCNC: 137 MMOL/L (ref 133–144)
TRIGL SERPL-MCNC: 721 MG/DL

## 2020-09-29 PROCEDURE — 36415 COLL VENOUS BLD VENIPUNCTURE: CPT | Performed by: INTERNAL MEDICINE

## 2020-09-29 PROCEDURE — 80053 COMPREHEN METABOLIC PANEL: CPT | Performed by: INTERNAL MEDICINE

## 2020-09-29 PROCEDURE — 83721 ASSAY OF BLOOD LIPOPROTEIN: CPT | Performed by: INTERNAL MEDICINE

## 2020-09-29 PROCEDURE — 80061 LIPID PANEL: CPT | Performed by: INTERNAL MEDICINE

## 2020-10-01 ENCOUNTER — VIRTUAL VISIT (OUTPATIENT)
Dept: CARDIOLOGY | Facility: CLINIC | Age: 35
End: 2020-10-01
Attending: INTERNAL MEDICINE

## 2020-10-01 DIAGNOSIS — K85.10 ACUTE BILIARY PANCREATITIS, UNSPECIFIED COMPLICATION STATUS: ICD-10-CM

## 2020-10-01 DIAGNOSIS — E78.1 HYPERTRIGLYCERIDEMIA: Primary | ICD-10-CM

## 2020-10-01 PROCEDURE — 99213 OFFICE O/P EST LOW 20 MIN: CPT | Mod: 95 | Performed by: INTERNAL MEDICINE

## 2020-10-01 NOTE — LETTER
"10/1/2020      RE: Booker Quintana  [address] [facility] [address]       Dear Colleague,    Thank you for the opportunity to participate in the care of your patient, Booker Quintana, at the [facility] HEART [facility]. Please see a copy of my visit note below.    Booker Quintana is a 34 year old male who is being evaluated via a billable telephone visit.      The patient has been notified of following:     \"This telephone visit will be conducted via a call between you and your physician/provider. We have found that certain health care needs can be provided without the need for a physical exam.  This service lets us provide the care you need with a short phone conversation.  If a prescription is necessary we can send it directly to your pharmacy.  If lab work is needed we can place an order for that and you can then stop by our lab to have the test done at a later time.    If during the course of the call the physician/provider feels a telephone visit is not appropriate, you will not be charged for this service.\"    Patient has given verbal consent for Telephone visit?  Yes    What phone number would you like to be contacted at? [phone]    How would you like to obtain your AVS? Mail a copy    HPI: 33 yr old male patient with severe hypertriglyceridemia treated with fenofibrate (previously 4,000 --> 392 mg/dL) c/b pancreatitis, former smoker, presenting for routine follow up visit for management of hypertriglyceridemia. Feeling well, no chest pain, SOB, exertional dyspnea. No abdominal pain or discomfort, no nausea or vomiting, no diarrhea, steatorrhea.  Labs obtained 2 days ago show increased triglycerides > 700 mg/dL. Reports only recent changes are increase in alcohol intake.  Reports drinking ~ 5+ drinks per week, prefers rum and coca-cola.          PAST MEDICAL HISTORY:  Past Medical History:   Diagnosis Date     " Hypertriglyceridemia      Pancreatitis      Scheuermann disease        CURRENT MEDICATIONS:  Current Outpatient Medications   Medication Sig Dispense Refill     Bioflavonoid Products (VITAMIN C PLUS PO)        fenofibrate (TRICOR) 145 MG tablet Take 1 tablet (145 mg) by mouth 2 times daily 180 tablet 0     omega-3 acid ethyl esters (LOVAZA) 1 g capsule Take 2 capsules (2 g) by mouth 2 times daily 360 capsule 2     pantoprazole (PROTONIX) 40 MG EC tablet Take 1 tablet (40 mg) by mouth 2 times daily 360 tablet 1       PAST SURGICAL HISTORY:  Past Surgical History:   Procedure Laterality Date     ESOPHAGOSCOPY, GASTROSCOPY, DUODENOSCOPY (EGD), COMBINED N/A 4/29/2019    Procedure: ESOPHAGOGASTRODUODENOSCOPY, WITH BIOPSY;  Surgeon: Jey Guevara MD;  Location: [facility]       ALLERGIES     Allergies   Allergen Reactions     Oxycodone Nausea       FAMILY HISTORY:  Family History   Problem Relation Age of Onset     Diabetes Maternal Great-Grandmother      Diabetes Maternal Uncle      Diabetes Paternal Grandfather      Hyperlipidemia Paternal Grandfather      Arthritis Mother      Hypertension Father      Hyperlipidemia Father      Arthritis Father      Substance Abuse Father        SOCIAL HISTORY:  Social History     Socioeconomic History     Marital status: Single     Spouse name: Not on file     Number of children: Not on file     Years of education: Not on file     Highest education level: Not on file   Occupational History     Not on file   Social Needs     Financial resource strain: Not on file     Food insecurity     Worry: Not on file     Inability: Not on file     Transportation needs     Medical: Not on file     Non-medical: Not on file   Tobacco Use     Smoking status: Light Tobacco Smoker     Packs/day: 0.10     Types: Cigarettes     Smokeless tobacco: Never Used   Substance and Sexual Activity     Alcohol use: Yes     Frequency: 2-4 times a month     Comment: rare     Drug use: No     Sexual activity: Not  Currently     Partners: Female     Birth control/protection: Female Surgical   Lifestyle     Physical activity     Days per week: Not on file     Minutes per session: Not on file     Stress: Not on file   Relationships     Social connections     Talks on phone: Not on file     Gets together: Not on file     Attends [employer] service: Not on file     Active member of club or organization: Not on file     Attends meetings of clubs or organizations: Not on file     Relationship status: Not on file     Intimate partner violence     Fear of current or ex partner: Not on file     Emotionally abused: Not on file     Physically abused: Not on file     Forced sexual activity: Not on file   Other Topics Concern     Not on file   Social History Narrative     Not on file       ROS:   Constitutional: No fever, chills, or sweats. No weight gain/loss   ENT: No visual disturbance, ear ache, epistaxis, sore throat  Allergies/Immunologic: Negative.   Respiratory: No cough, hemoptysia  Cardiovascular: As per HPI  GI: No nausea, vomiting, hematemesis, melena, or hematochezia  : No urinary frequency, dysuria, or hematuria  Integument: Negative  Psychiatric: Negative  Neuro: Negative  Endocrinology: Negative   Musculoskeletal: Negative    EXAM:  There were no vitals taken for this visit.  Telephone encounter    Labs:  LIPID RESULTS:  Lab Results   Component Value Date    CHOL 166 09/29/2020    HDL 23 (L) 09/29/2020    LDL  09/29/2020     Cannot estimate LDL when triglyceride exceeds 400 mg/dL    LDL 75 09/29/2020    TRIG 721 (H) 09/29/2020    NHDL 143 (H) 09/29/2020       LIVER ENZYME RESULTS:  Lab Results   Component Value Date    AST 35 09/29/2020    ALT 67 09/29/2020       CBC RESULTS:  Lab Results   Component Value Date    WBC 6.8 04/10/2019    RBC 5.04 04/10/2019    HGB 14.4 04/10/2019    HCT 42.4 04/10/2019    MCV 84 04/10/2019    MCH 28.6 04/10/2019    MCHC 34.0 04/10/2019    RDW 13.2 04/10/2019     04/10/2019       BMP  RESULTS:  Lab Results   Component Value Date     09/29/2020    POTASSIUM 3.9 09/29/2020    CHLORIDE 106 09/29/2020    CO2 27 09/29/2020    ANIONGAP 4 09/29/2020     (H) 09/29/2020    BUN 19 09/29/2020    CR 1.01 09/29/2020    GFRESTIMATED >90 09/29/2020    GFRESTBLACK >90 09/29/2020    BRIAN 10.4 (H) 09/29/2020        A1C RESULTS:  Lab Results   Component Value Date    A1C 5.6 03/28/2019             Assessment and Plan: 34M with hypertriglyceridemia c/b pancreatitis presenting for management of hypertriglyceridemia. Previously relatively well controlled (392 mg/dL), now 721 mg/dL with corresponding non-HDL-C of 143 mg/dL. In the setting of LDL-C 75mg/dL, these values indicate a triglyceride-rich lipoprotein cholesterol (TRL-C; remnant cholesterol) of ~ 70 mg/dL which confers a heightened risk of future cardiovascular disease. Of particular concern acutely is the risk of recurrent pancreaitits and possible development of insulin dependent diabetes due to loss of pancreatic parenchyma.     Plans:   - decrease alcohol intake (advised patient)  - lipids in 1 month  - HbA1c in 1 month  - follow up with clinic in 6 months (virtual visit)      Will Sandoval MD, Msc  Cardiovascular Disease Fellow  [facility]      I have interviewed patient with CV fellow. I have reviewed the laboratory tests  with patient and CV fellow. I have reviewed the management plan with the patient and the CV fellow. I discussed with the CV fellow and agree with the findings and plan in this CV fellow s note.  In addition to the assessment and plan changes  have been incorporated into the note by myself, as to make it a single cohesive document.  30 min were spent directly with patient during phone visit.     Carlo Hernandez MD, PhD  Professor of Medicine  Division of Cardiology      CC  Patient Care Team:  Lenora Cobian APRN CNP as PCP - General (Nurse Practitioner - Family)  Lenora Cobian APRN  CNP as Assigned PCP  Kathy Cooper LPN as Nurse Coordinator (Cardiology)  SELF, REFERRED        Please do not hesitate to contact me if you have any questions/concerns.     Sincerely,     Carlo Hernandez MD

## 2020-10-01 NOTE — PROGRESS NOTES
"Booker Quintana is a 34 year old male who is being evaluated via a billable telephone visit.      The patient has been notified of following:     \"This telephone visit will be conducted via a call between you and your physician/provider. We have found that certain health care needs can be provided without the need for a physical exam.  This service lets us provide the care you need with a short phone conversation.  If a prescription is necessary we can send it directly to your pharmacy.  If lab work is needed we can place an order for that and you can then stop by our lab to have the test done at a later time.    If during the course of the call the physician/provider feels a telephone visit is not appropriate, you will not be charged for this service.\"    Patient has given verbal consent for Telephone visit?  Yes    What phone number would you like to be contacted at? [phone]    How would you like to obtain your AVS? Mail a copy    HPI: 33 yr old male patient with severe hypertriglyceridemia treated with fenofibrate (previously 4,000 --> 392 mg/dL) c/b pancreatitis, former smoker, presenting for routine follow up visit for management of hypertriglyceridemia. Feeling well, no chest pain, SOB, exertional dyspnea. No abdominal pain or discomfort, no nausea or vomiting, no diarrhea, steatorrhea.  Labs obtained 2 days ago show increased triglycerides > 700 mg/dL. Reports only recent changes are increase in alcohol intake.  Reports drinking ~ 5+ drinks per week, prefers rum and coca-cola.          PAST MEDICAL HISTORY:  Past Medical History:   Diagnosis Date     Hypertriglyceridemia      Pancreatitis      Scheuermann disease        CURRENT MEDICATIONS:  Current Outpatient Medications   Medication Sig Dispense Refill     Bioflavonoid Products (VITAMIN C PLUS PO)        fenofibrate (TRICOR) 145 MG tablet Take 1 tablet (145 mg) by mouth 2 times daily 180 tablet 0     omega-3 acid ethyl esters (LOVAZA) 1 g capsule Take " 2 capsules (2 g) by mouth 2 times daily 360 capsule 2     pantoprazole (PROTONIX) 40 MG EC tablet Take 1 tablet (40 mg) by mouth 2 times daily 360 tablet 1       PAST SURGICAL HISTORY:  Past Surgical History:   Procedure Laterality Date     ESOPHAGOSCOPY, GASTROSCOPY, DUODENOSCOPY (EGD), COMBINED N/A 4/29/2019    Procedure: ESOPHAGOGASTRODUODENOSCOPY, WITH BIOPSY;  Surgeon: Jey Guevara MD;  Location: [facility]       ALLERGIES     Allergies   Allergen Reactions     Oxycodone Nausea       FAMILY HISTORY:  Family History   Problem Relation Age of Onset     Diabetes Maternal Great-Grandmother      Diabetes Maternal Uncle      Diabetes Paternal Grandfather      Hyperlipidemia Paternal Grandfather      Arthritis Mother      Hypertension Father      Hyperlipidemia Father      Arthritis Father      Substance Abuse Father        SOCIAL HISTORY:  Social History     Socioeconomic History     Marital status: Single     Spouse name: Not on file     Number of children: Not on file     Years of education: Not on file     Highest education level: Not on file   Occupational History     Not on file   Social Needs     Financial resource strain: Not on file     Food insecurity     Worry: Not on file     Inability: Not on file     Transportation needs     Medical: Not on file     Non-medical: Not on file   Tobacco Use     Smoking status: Light Tobacco Smoker     Packs/day: 0.10     Types: Cigarettes     Smokeless tobacco: Never Used   Substance and Sexual Activity     Alcohol use: Yes     Frequency: 2-4 times a month     Comment: rare     Drug use: No     Sexual activity: Not Currently     Partners: Female     Birth control/protection: Female Surgical   Lifestyle     Physical activity     Days per week: Not on file     Minutes per session: Not on file     Stress: Not on file   Relationships     Social connections     Talks on phone: Not on file     Gets together: Not on file     Attends Jew service: Not on file     Active member of  club or organization: Not on file     Attends meetings of clubs or organizations: Not on file     Relationship status: Not on file     Intimate partner violence     Fear of current or ex partner: Not on file     Emotionally abused: Not on file     Physically abused: Not on file     Forced sexual activity: Not on file   Other Topics Concern     Not on file   Social History Narrative     Not on file       ROS:   Constitutional: No fever, chills, or sweats. No weight gain/loss   ENT: No visual disturbance, ear ache, epistaxis, sore throat  Allergies/Immunologic: Negative.   Respiratory: No cough, hemoptysia  Cardiovascular: As per HPI  GI: No nausea, vomiting, hematemesis, melena, or hematochezia  : No urinary frequency, dysuria, or hematuria  Integument: Negative  Psychiatric: Negative  Neuro: Negative  Endocrinology: Negative   Musculoskeletal: Negative    EXAM:  There were no vitals taken for this visit.  Telephone encounter    Labs:  LIPID RESULTS:  Lab Results   Component Value Date    CHOL 166 09/29/2020    HDL 23 (L) 09/29/2020    LDL  09/29/2020     Cannot estimate LDL when triglyceride exceeds 400 mg/dL    LDL 75 09/29/2020    TRIG 721 (H) 09/29/2020    NHDL 143 (H) 09/29/2020       LIVER ENZYME RESULTS:  Lab Results   Component Value Date    AST 35 09/29/2020    ALT 67 09/29/2020       CBC RESULTS:  Lab Results   Component Value Date    WBC 6.8 04/10/2019    RBC 5.04 04/10/2019    HGB 14.4 04/10/2019    HCT 42.4 04/10/2019    MCV 84 04/10/2019    MCH 28.6 04/10/2019    MCHC 34.0 04/10/2019    RDW 13.2 04/10/2019     04/10/2019       BMP RESULTS:  Lab Results   Component Value Date     09/29/2020    POTASSIUM 3.9 09/29/2020    CHLORIDE 106 09/29/2020    CO2 27 09/29/2020    ANIONGAP 4 09/29/2020     (H) 09/29/2020    BUN 19 09/29/2020    CR 1.01 09/29/2020    GFRESTIMATED >90 09/29/2020    GFRESTBLACK >90 09/29/2020    BRINA 10.4 (H) 09/29/2020        A1C RESULTS:  Lab Results   Component  Value Date    A1C 5.6 03/28/2019             Assessment and Plan: 34M with hypertriglyceridemia c/b pancreatitis presenting for management of hypertriglyceridemia. Previously relatively well controlled (392 mg/dL), now 721 mg/dL with corresponding non-HDL-C of 143 mg/dL. In the setting of LDL-C 75mg/dL, these values indicate a triglyceride-rich lipoprotein cholesterol (TRL-C; remnant cholesterol) of ~ 70 mg/dL which confers a heightened risk of future cardiovascular disease. Of particular concern acutely is the risk of recurrent pancreaitits and possible development of insulin dependent diabetes due to loss of pancreatic parenchyma.     Plans:   - decrease alcohol intake (advised patient)  - lipids in 1 month  - HbA1c in 1 month  - follow up with clinic in 6 months (virtual visit)      Will Sadnoval MD, Msc  Cardiovascular Disease Fellow  [facility]      I have interviewed patient with CV fellow. I have reviewed the laboratory tests  with patient and CV fellow. I have reviewed the management plan with the patient and the CV fellow. I discussed with the CV fellow and agree with the findings and plan in this CV fellow s note.  In addition to the assessment and plan changes  have been incorporated into the note by myself, as to make it a single cohesive document.  30 min were spent directly with patient during phone visit.     Carlo Hernandez MD, PhD  Professor of Medicine  Division of Cardiology      CC  Patient Care Team:  Lenora Cobian APRN CNP as PCP - General (Nurse Practitioner - Family)  Lenora Cobian APRN CNP as Assigned PCP  Kathy Cooper LPN as Nurse Coordinator (Cardiology)  SELF, REFERRED

## 2020-11-16 ENCOUNTER — HEALTH MAINTENANCE LETTER (OUTPATIENT)
Age: 35
End: 2020-11-16

## 2020-11-16 DIAGNOSIS — E78.1 HYPERTRIGLYCERIDEMIA: ICD-10-CM

## 2020-11-18 RX ORDER — OMEGA-3-ACID ETHYL ESTERS 1 G/1
2 CAPSULE, LIQUID FILLED ORAL 2 TIMES DAILY
Qty: 360 CAPSULE | Refills: 3 | Status: SHIPPED | OUTPATIENT
Start: 2020-11-18 | End: 2021-12-12

## 2020-11-18 RX ORDER — FENOFIBRATE 145 MG/1
145 TABLET, COATED ORAL 2 TIMES DAILY
Qty: 180 TABLET | Refills: 3 | Status: SHIPPED | OUTPATIENT
Start: 2020-11-18 | End: 2021-12-06

## 2020-11-18 NOTE — TELEPHONE ENCOUNTER
Last Clinic Visit: 10/1/2020  [facility]

## 2020-12-10 DIAGNOSIS — K85.10 ACUTE BILIARY PANCREATITIS, UNSPECIFIED COMPLICATION STATUS: ICD-10-CM

## 2020-12-10 NOTE — TELEPHONE ENCOUNTER
"Requested Prescriptions   Pending Prescriptions Disp Refills     pantoprazole (PROTONIX) 40 MG EC tablet [Pharmacy Med Name: pantoprazole 40 mg tablet,delayed release] 360 tablet 1     Sig: TAKE 1 TABLET BY MOUTH TWICE DAILY       PPI Protocol Passed - 12/10/2020 12:08 PM        Passed - Not on Clopidogrel (unless Pantoprazole ordered)        Passed - No diagnosis of osteoporosis on record        Passed - Recent (12 mo) or future (30 days) visit within the authorizing provider's specialty     Patient has had an office visit with the authorizing provider or a provider within the authorizing providers department within the previous 12 mos or has a future within next 30 days. See \"Patient Info\" tab in inbasket, or \"Choose Columns\" in Meds & Orders section of the refill encounter.              Passed - Medication is active on med list        Passed - Patient is age 18 or older             "

## 2020-12-14 RX ORDER — PANTOPRAZOLE SODIUM 40 MG/1
TABLET, DELAYED RELEASE ORAL
Qty: 360 TABLET | Refills: 1 | Status: SHIPPED | OUTPATIENT
Start: 2020-12-14 | End: 2021-03-31

## 2020-12-14 NOTE — TELEPHONE ENCOUNTER
Routing refill request to provider for review/approval because: Provider to advise. Last OV 1/20/2020: Return in about 6 months (around 7/20/2020) for or sooner if symptoms persist or worsen, Lab Work, Routine Visit.    Should he still be taking? #360 with 1 R last ordered 1/14/2020 for Acute biliary pancreatitis, unspecified complication status [K85.10]       Ingrid DIXON, RN, BSN

## 2021-03-31 ENCOUNTER — OFFICE VISIT (OUTPATIENT)
Dept: FAMILY MEDICINE | Facility: CLINIC | Age: 36
End: 2021-03-31

## 2021-03-31 VITALS
BODY MASS INDEX: 38.8 KG/M2 | TEMPERATURE: 97.7 F | WEIGHT: 262 LBS | HEIGHT: 69 IN | SYSTOLIC BLOOD PRESSURE: 128 MMHG | RESPIRATION RATE: 16 BRPM | HEART RATE: 73 BPM | OXYGEN SATURATION: 97 % | DIASTOLIC BLOOD PRESSURE: 60 MMHG

## 2021-03-31 DIAGNOSIS — Z11.59 ENCOUNTER FOR HEPATITIS C SCREENING TEST FOR LOW RISK PATIENT: ICD-10-CM

## 2021-03-31 DIAGNOSIS — E78.1 HYPERTRIGLYCERIDEMIA: ICD-10-CM

## 2021-03-31 DIAGNOSIS — Z13.1 SCREENING FOR DIABETES MELLITUS: ICD-10-CM

## 2021-03-31 DIAGNOSIS — L60.0 INGROWN TOENAIL OF RIGHT FOOT: ICD-10-CM

## 2021-03-31 DIAGNOSIS — Z72.0 TOBACCO USE: ICD-10-CM

## 2021-03-31 DIAGNOSIS — K85.10 ACUTE BILIARY PANCREATITIS, UNSPECIFIED COMPLICATION STATUS: ICD-10-CM

## 2021-03-31 DIAGNOSIS — R73.09 ELEVATED GLUCOSE: ICD-10-CM

## 2021-03-31 DIAGNOSIS — Z00.00 ROUTINE GENERAL MEDICAL EXAMINATION AT A HEALTH CARE FACILITY: Primary | ICD-10-CM

## 2021-03-31 LAB
ANION GAP SERPL CALCULATED.3IONS-SCNC: 5 MMOL/L (ref 3–14)
BUN SERPL-MCNC: 14 MG/DL (ref 7–30)
CALCIUM SERPL-MCNC: 9.1 MG/DL (ref 8.5–10.1)
CHLORIDE SERPL-SCNC: 105 MMOL/L (ref 94–109)
CHOLEST SERPL-MCNC: 171 MG/DL
CO2 SERPL-SCNC: 27 MMOL/L (ref 20–32)
CREAT SERPL-MCNC: 0.87 MG/DL (ref 0.66–1.25)
GFR SERPL CREATININE-BSD FRML MDRD: >90 ML/MIN/{1.73_M2}
GLUCOSE SERPL-MCNC: 143 MG/DL (ref 70–99)
HCV AB SERPL QL IA: NONREACTIVE
HDLC SERPL-MCNC: 26 MG/DL
LDLC SERPL CALC-MCNC: ABNORMAL MG/DL
LDLC SERPL DIRECT ASSAY-MCNC: 65 MG/DL
NONHDLC SERPL-MCNC: 145 MG/DL
POTASSIUM SERPL-SCNC: 3.9 MMOL/L (ref 3.4–5.3)
SODIUM SERPL-SCNC: 137 MMOL/L (ref 133–144)
TRIGL SERPL-MCNC: 865 MG/DL

## 2021-03-31 PROCEDURE — 36415 COLL VENOUS BLD VENIPUNCTURE: CPT | Performed by: NURSE PRACTITIONER

## 2021-03-31 PROCEDURE — 99395 PREV VISIT EST AGE 18-39: CPT | Performed by: NURSE PRACTITIONER

## 2021-03-31 PROCEDURE — 80061 LIPID PANEL: CPT | Performed by: NURSE PRACTITIONER

## 2021-03-31 PROCEDURE — 80048 BASIC METABOLIC PNL TOTAL CA: CPT | Performed by: NURSE PRACTITIONER

## 2021-03-31 PROCEDURE — 86803 HEPATITIS C AB TEST: CPT | Performed by: NURSE PRACTITIONER

## 2021-03-31 PROCEDURE — 99214 OFFICE O/P EST MOD 30 MIN: CPT | Mod: 25 | Performed by: NURSE PRACTITIONER

## 2021-03-31 PROCEDURE — 83721 ASSAY OF BLOOD LIPOPROTEIN: CPT | Performed by: NURSE PRACTITIONER

## 2021-03-31 RX ORDER — PANTOPRAZOLE SODIUM 40 MG/1
40 TABLET, DELAYED RELEASE ORAL 2 TIMES DAILY
Qty: 360 TABLET | Refills: 1 | Status: SHIPPED | OUTPATIENT
Start: 2021-03-31 | End: 2024-07-09

## 2021-03-31 ASSESSMENT — MIFFLIN-ST. JEOR: SCORE: 2110.86

## 2021-03-31 NOTE — PROGRESS NOTES
"  3  SUBJECTIVE:   CC: Booker Quintana is an 35 year old male who presents for preventive health visit.       Patient has been advised of split billing requirements and indicates understanding: Yes  Healthy Habits:  Do you get at least three servings of calcium containing foods daily (dairy, green leafy vegetables, etc.)? No   Amount of exercise or daily activities, outside of work: 0 day(s) per week  Problems taking medications regularly No  Medication side effects: No  Have you had an eye exam in the past two years? no  Do you see a dentist twice per year? no  Do you have sleep apnea, excessive snoring or daytime drowsiness?yes    Patient has ingrown toe nail right big toe x 1 1/2 weeks   He has been doing soaks and using tea tree oil which has helped. 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        GERD/Heartburn  Onset: years   Description:   Burning in chest: YES  Intensity: moderate  Progression of Symptoms: worsening  Accompanying Signs & Symptoms:  Does it feel like food gets stuck: no  Nausea: YES  Vomiting (bloody?): YES  Abdominal Pain: no  Black-Tarry stools: no:  Bloody stools: no  History:   Previous ulcers: YES  Precipitating factors:   Caffeine use: YES  Alcohol use: YES  NSAID/Aspirin use: no  Tobacco use: YES  Worse with fatty foods, spicy foods and effeminated drinks.  Alleviating factors:  Protonix     Therapies Tried and outcome:liquid antacid, chewable antacid and OTC H2 blocker:    EGD was normal. He admits to drinking pop and cheese balls before sleep. He also eats dinner later in the day time.  Continues to smoke. Drinks \"only occasionally\"    Today's PHQ-2 Score:   PHQ-2 ( 1999 Pfizer) 3/31/2021 4/10/2019   Q1: Little interest or pleasure in doing things 0 0   Q2: Feeling down, depressed or hopeless 0 0   PHQ-2 Score 0 0       Abuse: Current or Past(Physical, Sexual or Emotional)- No  Do you feel safe in your " environment? Yes    Have you ever done Advance Care Planning? (For example, a Health Directive, POLST, or a discussion with a medical provider or your loved ones about your wishes): No, advance care planning information given to patient to review.  Patient plans to discuss their wishes with loved ones or provider.      Social History     Tobacco Use     Smoking status: Light Tobacco Smoker     Packs/day: 0.10     Types: Cigarettes     Smokeless tobacco: Never Used   Substance Use Topics     Alcohol use: Yes     Frequency: 2-4 times a month     Comment: rare     If you drink alcohol do you typically have >3 drinks per day or >7 drinks per week? No                      Last PSA: No results found for: PSA    Reviewed orders with patient. Reviewed health maintenance and updated orders accordingly - Yes  BP Readings from Last 3 Encounters:   03/31/21 128/60   01/20/20 128/70   09/03/19 120/70    Wt Readings from Last 3 Encounters:   03/31/21 118.8 kg (262 lb)   01/20/20 106.1 kg (234 lb)   09/03/19 110.7 kg (244 lb)                  Patient Active Problem List   Diagnosis     Acute pancreatitis due to hypertriglyceridemia     Chronic GERD     Hypertriglyceridemia     Obesity (BMI 35.0-39.9) with comorbidity (H)     Past Surgical History:   Procedure Laterality Date     ESOPHAGOSCOPY, GASTROSCOPY, DUODENOSCOPY (EGD), COMBINED N/A 4/29/2019    Procedure: ESOPHAGOGASTRODUODENOSCOPY, WITH BIOPSY;  Surgeon: Jey Guevara MD;  Location: [facility]       Social History     Tobacco Use     Smoking status: Light Tobacco Smoker     Packs/day: 0.10     Types: Cigarettes     Smokeless tobacco: Never Used   Substance Use Topics     Alcohol use: Yes     Frequency: 2-4 times a month     Comment: rare     Family History   Problem Relation Age of Onset     Diabetes Maternal Great-Grandmother      Diabetes Maternal Uncle      Diabetes Paternal Grandfather      Hyperlipidemia Paternal Grandfather      Arthritis Mother      Hypertension Father       Hyperlipidemia Father      Arthritis Father      Substance Abuse Father          Current Outpatient Medications   Medication Sig Dispense Refill     fenofibrate (TRICOR) 145 MG tablet Take 1 tablet (145 mg) by mouth 2 times daily 180 tablet 3     omega-3 acid ethyl esters (LOVAZA) 1 g capsule Take 2 capsules (2 g) by mouth 2 times daily 360 capsule 3     pantoprazole (PROTONIX) 40 MG EC tablet Take 1 tablet (40 mg) by mouth 2 times daily 360 tablet 1     Bioflavonoid Products (VITAMIN C PLUS PO)        Allergies   Allergen Reactions     Oxycodone Nausea     Recent Labs   Lab Test 09/29/20  0737 01/21/20  0850 07/17/19  0808 07/01/19  0959 03/28/19  1328 03/28/19  1328 03/23/19  0541 03/23/19  0541 03/20/19  1247 03/20/19  1247 03/20/19  0506   A1C  --   --   --   --   --  5.6  --   --   --   --  5.7*   LDL Cannot estimate LDL when triglyceride exceeds 400 mg/dL  75 30 82 Cannot estimate LDL when triglyceride exceeds 400 mg/dL   < >  --   --   --   --   --  Cannot estimate LDL when triglyceride exceeds 400 mg/dL   HDL 23* 21* 34* 27*   < >  --   --   --   --   --  17*   TRIG 721* 392* 399* 1,071*   < > 301*   < > 578*   < >  --  4,463*   ALT 67  --  63  --   --   --   --  45   < >  --  79*   CR 1.01 0.80 0.90  --   --   --    < > 0.85   < >  --  0.80   GFRESTIMATED >90 >90 >90  --   --   --    < > >90   < >  --  >90   GFRESTBLACK >90 >90 >90  --   --   --    < > >90   < >  --  >90   POTASSIUM 3.9 3.5 4.2  --   --   --    < > 3.4   < >  --  3.9   TSH  --   --   --  2.34  --   --   --   --   --  2.08  --     < > = values in this interval not displayed.        Reviewed and updated as needed this visit by clinical staff  Tobacco  Allergies  Meds              Reviewed and updated as needed this visit by Provider                Past Medical History:   Diagnosis Date     Hypertriglyceridemia      Pancreatitis      Scheuermann disease       Past Surgical History:   Procedure Laterality Date     ESOPHAGOSCOPY,  "GASTROSCOPY, DUODENOSCOPY (EGD), COMBINED N/A 4/29/2019    Procedure: ESOPHAGOGASTRODUODENOSCOPY, WITH BIOPSY;  Surgeon: Jey Guevara MD;  Location: [facility]       ROS:  CONSTITUTIONAL: NEGATIVE for fever, chills, change in weight  INTEGUMENTARY/SKIN: NEGATIVE for worrisome rashes, moles or lesions  EYES: NEGATIVE for vision changes or irritation  ENT: NEGATIVE for ear, mouth and throat problems  RESP: NEGATIVE for significant cough or SOB  CV: NEGATIVE for chest pain, palpitations or peripheral edema  GI: NEGATIVE for nausea, abdominal pain, heartburn, or change in bowel habits POSITIVE for GERD and night time emesis    male: negative for dysuria, hematuria, decreased urinary stream, erectile dysfunction, urethral discharge  MUSCULOSKELETAL: NEGATIVE for significant arthralgias or myalgia POSITIVE for ingrown toenail  NEURO: NEGATIVE for weakness, dizziness or paresthesias  PSYCHIATRIC: NEGATIVE for changes in mood or affect    OBJECTIVE:   /60 (BP Location: Right arm, Patient Position: Chair, Cuff Size: Adult Large)   Pulse 73   Temp 97.7  F (36.5  C)   Resp 16   Ht 1.74 m (5' 8.5\")   Wt 118.8 kg (262 lb)   SpO2 97%   BMI 39.26 kg/m    EXAM:  GENERAL: healthy, alert and no distress  EYES: Eyes grossly normal to inspection, PERRL and conjunctivae and sclerae normal  HENT: ear canals and TM's normal, nose and mouth without ulcers or lesions  NECK: no adenopathy, no asymmetry, masses, or scars and thyroid normal to palpation  RESP: lungs clear to auscultation - no rales, rhonchi or wheezes  CV: regular rate and rhythm, normal S1 S2, no S3 or S4, no murmur, click or rub, no peripheral edema and peripheral pulses strong  ABDOMEN: soft, obese, nontender, no hepatosplenomegaly, no masses and bowel sounds normal  MS: no gross musculoskeletal defects noted, no edema, mild ingrown toenail lateral right great toe, mild erythema and dryness around nail  SKIN: no suspicious lesions or rashes  NEURO: Normal " strength and tone, mentation intact and speech normal  PSYCH: mentation appears normal, affect normal/bright    Diagnostic Test Results:  Labs reviewed in Epic  No results found for this or any previous visit (from the past 24 hour(s)).    ASSESSMENT/PLAN:   1. Routine general medical examination at a health care facility      2. Acute biliary pancreatitis, unspecified complication status    - pantoprazole (PROTONIX) 40 MG EC tablet; Take 1 tablet (40 mg) by mouth 2 times daily  Dispense: 360 tablet; Refill: 1  Stable pancreatitis, lingering GERD. Reviewed lifestyle changes, no more pop, no eating before bedtime, sleeping with HOB elevated.    3. Ingrown toenail of right foot    - Orthopedic & Spine  Referral; Future    4. Screening for diabetes mellitus    - Basic metabolic panel  (Ca, Cl, CO2, Creat, Gluc, K, Na, BUN)    5. Hypertriglyceridemia    - Lipid panel reflex to direct LDL Fasting    6. Encounter for hepatitis C screening test for low risk patient    - Hepatitis C antibody    7. Tobacco use    Reviewed health risks including worsening GERD, he will continue to wean down on nicotine use.    Patient Instructions   Will be notified of pending labs.  Stop pop and try Zevia or Irma instead.  Allow toenail to grow out. Soaks. Follow up with podiatry as needed.  No eating before bedtime and sleep with HOB elevated.    Preventive Health Recommendations  Male Ages 26 - 39    Yearly exam:             See your health care provider every year in order to  o   Review health changes.   o   Discuss preventive care.    o   Review your medicines if your doctor has prescribed any.  You should be tested each year for STDs (sexually transmitted diseases), if you re at risk.   After age 35, talk to your provider about cholesterol testing. If you are at risk for heart disease, have your cholesterol tested at least every 5 years.   If you are at risk for diabetes, you should have a diabetes test (fasting  "glucose).  Shots: Get a flu shot each year. Get a tetanus shot every 10 years.     Nutrition:  Eat at least 5 servings of fruits and vegetables daily.   Eat whole-grain bread, whole-wheat pasta and brown rice instead of white grains and rice.   Get adequate Calcium and Vitamin D.     Lifestyle  Exercise for at least 150 minutes a week (30 minutes a day, 5 days a week). This will help you control your weight and prevent disease.   Limit alcohol to one drink per day.   No smoking.   Wear sunscreen to prevent skin cancer.   See your dentist every six months for an exam and cleaning.           COUNSELING:  Reviewed preventive health counseling, as reflected in patient instructions       Regular exercise       Healthy diet/nutrition       Consider Hep C screening for all patients one time for ages 18-79 years       Advance Care Planning    Estimated body mass index is 39.26 kg/m  as calculated from the following:    Height as of this encounter: 1.74 m (5' 8.5\").    Weight as of this encounter: 118.8 kg (262 lb).    Weight management plan: Discussed healthy diet and exercise guidelines    He reports that he has been smoking cigarettes. He has been smoking about 0.10 packs per day. He has never used smokeless tobacco.  Tobacco Cessation Action Plan:   Information offered: Patient not interested at this time      Counseling Resources:  ATP IV Guidelines  Pooled Cohorts Equation Calculator  FRAX Risk Assessment  ICSI Preventive Guidelines  Dietary Guidelines for Americans, 2010  USDA's MyPlate  ASA Prophylaxis  Lung CA Screening    GRANT Fajardo CNP  [facility]  "

## 2021-03-31 NOTE — PATIENT INSTRUCTIONS
Will be notified of pending labs.  Stop pop and try Zevia or Irma instead.  Allow toenail to grow out. Soaks. Follow up with podiatry as needed.  No eating before bedtime and sleep with HOB elevated.    Preventive Health Recommendations  Male Ages 26 - 39    Yearly exam:             See your health care provider every year in order to  o   Review health changes.   o   Discuss preventive care.    o   Review your medicines if your doctor has prescribed any.    You should be tested each year for STDs (sexually transmitted diseases), if you re at risk.     After age 35, talk to your provider about cholesterol testing. If you are at risk for heart disease, have your cholesterol tested at least every 5 years.     If you are at risk for diabetes, you should have a diabetes test (fasting glucose).  Shots: Get a flu shot each year. Get a tetanus shot every 10 years.     Nutrition:    Eat at least 5 servings of fruits and vegetables daily.     Eat whole-grain bread, whole-wheat pasta and brown rice instead of white grains and rice.     Get adequate Calcium and Vitamin D.     Lifestyle    Exercise for at least 150 minutes a week (30 minutes a day, 5 days a week). This will help you control your weight and prevent disease.     Limit alcohol to one drink per day.     No smoking.     Wear sunscreen to prevent skin cancer.     See your dentist every six months for an exam and cleaning.

## 2021-04-07 DIAGNOSIS — R73.09 ELEVATED GLUCOSE: ICD-10-CM

## 2021-04-07 LAB — HBA1C MFR BLD: 6.5 % (ref 0–5.6)

## 2021-04-07 PROCEDURE — 83036 HEMOGLOBIN GLYCOSYLATED A1C: CPT | Performed by: NURSE PRACTITIONER

## 2021-04-07 PROCEDURE — 36415 COLL VENOUS BLD VENIPUNCTURE: CPT | Performed by: NURSE PRACTITIONER

## 2021-04-12 ENCOUNTER — VIRTUAL VISIT (OUTPATIENT)
Dept: FAMILY MEDICINE | Facility: CLINIC | Age: 36
End: 2021-04-12

## 2021-04-12 DIAGNOSIS — E11.69 TYPE 2 DIABETES MELLITUS WITH OTHER SPECIFIED COMPLICATION, WITHOUT LONG-TERM CURRENT USE OF INSULIN (H): Primary | ICD-10-CM

## 2021-04-12 DIAGNOSIS — T14.8XXA SUPERFICIAL FOREIGN BODY (SLIVER): ICD-10-CM

## 2021-04-12 PROBLEM — E11.9 DIABETES MELLITUS, TYPE 2 (H): Status: ACTIVE | Noted: 2021-04-12

## 2021-04-12 PROCEDURE — 99213 OFFICE O/P EST LOW 20 MIN: CPT | Mod: 95 | Performed by: NURSE PRACTITIONER

## 2021-04-12 NOTE — PATIENT INSTRUCTIONS
Stop the pop and sugar in diet.  Exercise and stay hydrated. Will recheck your A1c in 3 months and if it remains elevated, initiate treatment for diabetes.  To specialist for your concerns with your thumb and a sliver.  Call to make lab appointment in 3 months at [facility].      Our Clinic hours are:  Mondays    7:20 am - 7 pm  Tues -  Fri  7:20 am - 5 pm    Clinic Phone: [phone]    The clinic lab opens at 7:30 am Mon - Fri and appointments are required.    [facility] [place]  Ph. [phone]  Monday  8 am - 7pm  Tues - Fri 8 am - 5:30 pm

## 2021-04-12 NOTE — PROGRESS NOTES
"LATOSHA is a 35 year old who is being evaluated via a billable telephone visit.      What phone number would you like to be contacted at? [phone]  How would you like to obtain your AVS? MyChart    Assessment & Plan     Type 2 diabetes mellitus with other specified complication, without long-term current use of insulin (H)    - **A1C FUTURE 3mo; Future  After discussion, he wishes to address lifestyle changes particularly no more pop or \"sweets\" Will recheck a1c in 3 months and if it remains elevated, send to diabetes educator and start glucophage.    Superficial foreign body (sliver)    - DERMATOLOGY ADULT REFERRAL; Future             See Patient Instructions  Patient Instructions   Stop the pop and sugar in diet.  Exercise and stay hydrated. Will recheck your A1c in 3 months and if it remains elevated, initiate treatment for diabetes.  To specialist for your concerns with your thumb and a sliver.  Call to make lab appointment in 3 months at [facility].      Our Clinic hours are:  Mondays    7:20 am - 7 pm  Tues -  Fri  7:20 am - 5 pm    Clinic Phone: [phone]    The clinic lab opens at 7:30 am Mon - Fri and appointments are required.    [facility] [place]  Ph. [phone]  Monday  8 am - 7pm  Tues - Fri 8 am - 5:30 pm             Return in about 3 months (around 7/12/2021) for or sooner if symptoms persist or worsen, Lab Work.    Lenora Cobian, GRANT CNP  [facility]    Subjective   LATOSHA is a 35 year old who presents for the following health issues  accompanied by himself:    HPI     Chief Complaint   Patient presents with     Results     Results of lab work from 3-31-21 with a repeat glucose-/hgba1c level on 4-7-2021.  Was told to make a telephone visit to discuss about a new diagnosis of diabetes.       Component      Latest Ref Rng & Units 3/31/2021   Sodium      133 - 144 mmol/L 137   Potassium      3.4 - 5.3 mmol/L 3.9   Chloride      94 - 109 mmol/L 105   Carbon " "Dioxide      20 - 32 mmol/L 27   Anion Gap      3 - 14 mmol/L 5   Glucose      70 - 99 mg/dL 143 (H)   Urea Nitrogen      7 - 30 mg/dL 14   Creatinine      0.66 - 1.25 mg/dL 0.87   GFR Estimate      >60 mL/min/1.73:m2 >90   GFR Estimate If Black      >60 mL/min/1.73:m2 >90   Calcium      8.5 - 10.1 mg/dL 9.1   Cholesterol      <200 mg/dL 171   Triglycerides      <150 mg/dL 865 (H)   HDL Cholesterol      >39 mg/dL 26 (L)   LDL Cholesterol Calculated      <100 mg/dL Cannot estimate LDL when triglyceride exceeds 400 mg/dL   Non HDL Cholesterol      <130 mg/dL 145 (H)   Hepatitis C Antibody      NR:Nonreactive Nonreactive   LDL Cholesterol Direct      <100 mg/dL 65         Component      Latest Ref Rng & Units 4/7/2021   Hemoglobin A1C      0 - 5.6 % 6.5 (H)       States he drinks regular pop daily and has a \"sweet tooth\"  He also has a sliver in his thumb that he has been soaking and it's \"not coming out\"   He is concerned it may be becoming infected.        Review of Systems   Constitutional, HEENT, cardiovascular, pulmonary, gi and gu systems are negative, except as otherwise noted.      Objective           Vitals:  No vitals were obtained today due to virtual visit.    Physical Exam   healthy, alert and no distress  PSYCH: Alert and oriented times 3; coherent speech, normal   rate and volume, able to articulate logical thoughts, able   to abstract reason, no tangential thoughts, no hallucinations   or delusions  His affect is normal  RESP: No cough, no audible wheezing, able to talk in full sentences  Remainder of exam unable to be completed due to telephone visits                Phone call duration: 15 minutes    "

## 2021-07-24 ENCOUNTER — HEALTH MAINTENANCE LETTER (OUTPATIENT)
Age: 36
End: 2021-07-24

## 2021-09-12 ENCOUNTER — HEALTH MAINTENANCE LETTER (OUTPATIENT)
Age: 36
End: 2021-09-12

## 2021-11-07 ENCOUNTER — HEALTH MAINTENANCE LETTER (OUTPATIENT)
Age: 36
End: 2021-11-07

## 2021-12-03 DIAGNOSIS — E78.1 HYPERTRIGLYCERIDEMIA: ICD-10-CM

## 2021-12-06 RX ORDER — FENOFIBRATE 145 MG/1
145 TABLET, COATED ORAL 2 TIMES DAILY
Qty: 180 TABLET | Refills: 0 | Status: SHIPPED | OUTPATIENT
Start: 2021-12-06 | End: 2022-08-06

## 2021-12-06 NOTE — TELEPHONE ENCOUNTER
Last Clinic Visit: 10/1/2020  [facility]  roelue for follow-up   90 day jay refill sent to the pharmacy - including instructions for patient to call the clinic and schedule an appointment.

## 2021-12-09 DIAGNOSIS — E78.1 HYPERTRIGLYCERIDEMIA: ICD-10-CM

## 2021-12-12 NOTE — TELEPHONE ENCOUNTER
"  omega-3 acid ethyl esters (LOVAZA) 1 g capsule  Last Written Prescription Date:  11/18/20  Last Fill Quantity: 360,   # refills: 3  Last Office Visit : 10/1/20  Future Office visit: none    \" follow up with clinic in 6 months (virtual visit)\"    Routing refill request to provider for review/approval because: past due for 6mth virtual appt. alt  "

## 2021-12-14 RX ORDER — OMEGA-3-ACID ETHYL ESTERS 1 G/1
2 CAPSULE, LIQUID FILLED ORAL 2 TIMES DAILY
Qty: 120 CAPSULE | Refills: 0 | Status: SHIPPED | OUTPATIENT
Start: 2021-12-14 | End: 2022-08-06

## 2022-02-27 ENCOUNTER — HEALTH MAINTENANCE LETTER (OUTPATIENT)
Age: 37
End: 2022-02-27

## 2022-06-15 ENCOUNTER — TELEPHONE (OUTPATIENT)
Dept: RESEARCH | Facility: CLINIC | Age: 37
End: 2022-06-15

## 2022-06-15 NOTE — TELEPHONE ENCOUNTER
I spoke with LATOSHA to explain the AROAPOC3-2001 clinical trial, and to see if he may have interest in coming in for a visit with us to review & sign consent and then to begin screening for the study. LATOSHA expressed that he is interested, so we will proceed with scheduling his initial visit.

## 2022-06-19 ENCOUNTER — HEALTH MAINTENANCE LETTER (OUTPATIENT)
Age: 37
End: 2022-06-19

## 2022-06-21 DIAGNOSIS — E78.1 HYPERTRIGLYCERIDEMIA: ICD-10-CM

## 2022-06-22 ENCOUNTER — RESEARCH ENCOUNTER (OUTPATIENT)
Dept: RESEARCH | Facility: CLINIC | Age: 37
End: 2022-06-22

## 2022-06-25 NOTE — TELEPHONE ENCOUNTER
fenofibrate nanocrystallized 145 mg tablet      Last Written Prescription Date:  12/6/21  Last Fill Quantity: 180,   # refills: 0  Last Office Visit : Carlo Hernandez MD  Cardiology  10/1/2020  [facility]  Recommended 6 month follow up  Future Office visit:  None scheduled    Routing refill request to provider for review/approval because:  Received jay refill with last fill  How taking?  Overdue for lab  Lab Test 03/31/21  0927   CHOL 171   TRIG 865*   HDL 26*   LDL Cannot estimate LDL when triglyceride exceeds 400 mg/dL  65   NHDL 145*     Nothing more recent in care everywhere nor media  No future orders in queue    omega-3 acid ethyl esters 1 gram capsule      Last Written Prescription Date:  12/14/21  Last Fill Quantity: 120,   # refills: 0  Last Office Visit : Carlo Hernandez MD  Cardiology  10/1/2020  [facility]  Recommended 6 month follow up  Future Office visit:  None scheduled    Routing refill request to provider for review/approval because:  Received jay refill with last fill  How taking?  Overdue for labs  Lab Test 03/31/21  0927   CHOL 171   TRIG 865*   HDL 26*   LDL Cannot estimate LDL when triglyceride exceeds 400 mg/dL  65   NHDL 145*     Lab Test 09/29/20  0737   ALT 67

## 2022-06-27 DIAGNOSIS — E78.1 HYPERTRIGLYCERIDEMIA: Primary | ICD-10-CM

## 2022-06-27 RX ORDER — OMEGA-3-ACID ETHYL ESTERS 1 G/1
2 CAPSULE, LIQUID FILLED ORAL 2 TIMES DAILY
Qty: 120 CAPSULE | OUTPATIENT
Start: 2022-06-27

## 2022-06-27 RX ORDER — FENOFIBRATE 145 MG/1
145 TABLET, COATED ORAL 2 TIMES DAILY
Qty: 180 TABLET | OUTPATIENT
Start: 2022-06-27

## 2022-07-08 ENCOUNTER — TELEPHONE (OUTPATIENT)
Dept: RESEARCH | Facility: CLINIC | Age: 37
End: 2022-07-08

## 2022-07-08 NOTE — TELEPHONE ENCOUNTER
"I called LATOSHA this morning to report a \"critical high\" triglceride value:    Triglycerides = 2367 (sample collected on 06-JUL-2022    Per protocol, I provided counseling to LATOSHA about dietary and lifestyle factors that may contribute to high triglyceride levels, signs and symptoms of pancreatitis, and measures he might take to avoid a recurrence, including avoidance of alcohol; reducing refined carbohydrates, sweetened beverages, and processed foods; increasing dietary fiber; focusing on healthful versus unhealthful fats (and several examples of both of these); increasing water intake, and exercising regularly.   "

## 2022-07-15 ENCOUNTER — TELEPHONE (OUTPATIENT)
Dept: RESEARCH | Facility: CLINIC | Age: 37
End: 2022-07-15

## 2022-07-15 NOTE — TELEPHONE ENCOUNTER
"Scehduling/rescheduling call for LATOSHA's \"Day 1\" (first receipt of study drug vs. Placebo) visit.  "

## 2022-08-04 ENCOUNTER — RESEARCH ENCOUNTER (OUTPATIENT)
Dept: RESEARCH | Facility: CLINIC | Age: 37
End: 2022-08-04

## 2022-08-04 NOTE — PROGRESS NOTES
Screening visit (combined) for the AROAPOC3-2001 Clinical Trial, NCT [id number] were completed. Notes ad data collected are a part of the study records.

## 2022-08-06 DIAGNOSIS — E78.1 HYPERTRIGLYCERIDEMIA: ICD-10-CM

## 2022-08-06 RX ORDER — FENOFIBRATE 145 MG/1
145 TABLET, COATED ORAL 2 TIMES DAILY
Qty: 180 TABLET | Refills: 0 | Status: SHIPPED | OUTPATIENT
Start: 2022-08-06 | End: 2023-06-24

## 2022-08-06 RX ORDER — OMEGA-3-ACID ETHYL ESTERS 1 G/1
2 CAPSULE, LIQUID FILLED ORAL 2 TIMES DAILY
Qty: 120 CAPSULE | Refills: 0 | Status: SHIPPED | OUTPATIENT
Start: 2022-08-06 | End: 2023-06-24

## 2022-08-11 ENCOUNTER — TELEPHONE (OUTPATIENT)
Dept: RESEARCH | Facility: CLINIC | Age: 37
End: 2022-08-11

## 2022-08-11 NOTE — TELEPHONE ENCOUNTER
"This call was to confirm LATOSHA's asymptomatic status and plans to attend his scheduled visit ([re-] \"Screening Visit 3\") for the AROAPOC3-2001 Clinical Trial with us tomorrow morning in the [facility] clinic.  "

## 2022-08-15 ENCOUNTER — TELEPHONE (OUTPATIENT)
Dept: RESEARCH | Facility: CLINIC | Age: 37
End: 2022-08-15

## 2022-08-15 NOTE — TELEPHONE ENCOUNTER
I called LATOSHA as a follow-up to an email sent at 11:36 pm on 13-AUG-2022, attempting to provide him the earliest/best possible follow-up regarding his triglyceride level measured the day before for AROAPOC3-2001 screening.    TG = 1476 mg/dL; fasting sample collected at 10:08 am on 12-AUG-2022 and sent for evaluation at [facility] as a part of screening for the AROAPOC3-2001 clinical trial.    As a part of his notification of his results, measures that LATOSHA could take to lower his triglycerides were reviewed, as were the signs and symptoms of pancreatitis and how/when to seek help.

## 2022-08-28 ENCOUNTER — TELEPHONE (OUTPATIENT)
Dept: RESEARCH | Facility: CLINIC | Age: 37
End: 2022-08-28

## 2022-09-09 ENCOUNTER — ALLIED HEALTH/NURSE VISIT (OUTPATIENT)
Dept: RESEARCH | Facility: CLINIC | Age: 37
End: 2022-09-09

## 2022-09-09 DIAGNOSIS — Z00.6 PATIENT IN CLINICAL RESEARCH STUDY: ICD-10-CM

## 2022-09-09 DIAGNOSIS — Z00.6 EXAMINATION OF PARTICIPANT OR CONTROL IN CLINICAL RESEARCH: Primary | ICD-10-CM

## 2022-09-29 ENCOUNTER — TELEPHONE (OUTPATIENT)
Dept: RESEARCH | Facility: CLINIC | Age: 37
End: 2022-09-29

## 2022-11-03 ENCOUNTER — TELEPHONE (OUTPATIENT)
Dept: RESEARCH | Facility: CLINIC | Age: 37
End: 2022-11-03

## 2022-11-19 ENCOUNTER — HEALTH MAINTENANCE LETTER (OUTPATIENT)
Age: 37
End: 2022-11-19

## 2022-12-02 ENCOUNTER — RESEARCH ENCOUNTER (OUTPATIENT)
Dept: RESEARCH | Facility: CLINIC | Age: 37
End: 2022-12-02

## 2022-12-02 NOTE — PROGRESS NOTES
"LATOSHA came in to the [facility] clinic Today for his \"Week 12 + 24 Hours\" Post-Dose Follow-up Visit and final PK Blood Draw\" for the AROAPOC3-2001 Clinical [place].    LATOSHA reports feeling well and experiencing no injection site reactions including no pain, other discomfort, redness, induration, swelling, bleeding, medication leakage, itching, or any other signs/symptoms, adverse reactions of any kind, and no new symptoms or other adverse events.    His injection site was examined by both myself and Dr. Hernandez. Aside from a tiny pink dot < 1mm in circumference, not raised and not painful, there is no visible evidence of the injection at all, and the area is not tender to palpation.    A final PK blood sample was obtained at 12:27 pm. This was a non-fasting blood draw.    Please do not hesitate to contact the study team with any questions or concerns:    Martha Holder BA, RN, PHN, CCRC, CPT  (Research Nurse Coordinator)  Office - [phone]  Pager - [phone]     Carlo Hernandez MD, PhD  ()  Office - [phone]  Pager - [phone]    Neil Aayla MD  (Co-Investigator)  Office - [phone]  Pager: [phone]  "

## 2023-01-13 NOTE — PROGRESS NOTES
"Research Consent Process Documentation: [id number] Clinical Trial    Time of participant arrival: 9:21 am  Time when signature on the main consent document was provided by the participant: 10:35 am    This research consent process was preceded by a phone discussion between myself and LATOSHA on 15-PHANI-2022 about the AROAPOC3-2001 clinical trial, general information about the study, and his potential eligibility to participate if he was interested. This call is documented as a separate (phone) encounter in Epic.    Informed Consent Documents were provided to LATOSHA for his preliminary review at the start of this visit, prior to our review of the consent documents together. An inquiry about his understanding, and a request for hs signatures on the documents were made after this, once all questions had been answered.     Present in the room during the informed consent process were LATOSHA (\"the participant\" or \"participant candidate\", myself (author), and Jayashree Hoskins (coordinator).  Carlo Hernandez MD, PhD attended this visit after consent was signed, but was not present during the consent process, except to stop in and ask if the participant had any questions for him, per our normal process.    Each section of the main consent was reviewed with the participant candidate to ensure their understanding. This process was repeated for the PK and MRI sub-study consents. Within the main study consent, LATOSHA indicated his agreement  to optional genetic testing and also to allowing his biosamples to be retained by the study sponsor for future research.    Explanations and examples were given and time was allotted for answering any questions that came up.    ? The risks (potential for) of the study were explained, that the study was testing a new drug, and that all risks were not known.  ? The process of drug testing to obtain FDA approval, the phase of testing that this study represents, the total to be enrolled, and " the current status of enrollment were explained.  ? Visit expectations and the importance of adherence to the study protocol, including requirements for a stable diet and medications (&/or honesty in disclosure of excursions) during the run-in period and then throughout the study were emphasized.  ? Use of parking passes and the ClinCard for reimbursements and study payments was explained.  ? What would happen in the event of a related injury were explained, and that signing the consent did not cause a loss of rights.  ? The participant candidate signed all three consent forms, and was given copies by Jayashree Hoskins.     The time of consent signatures (on the main study consent) was recorded by Jayashree Hoskins as being at 10:35 am, prior to any research procedures, which then commenced beginning at 10:41 am.

## 2023-02-17 ENCOUNTER — TELEPHONE (OUTPATIENT)
Dept: RESEARCH | Facility: CLINIC | Age: 38
End: 2023-02-17

## 2023-02-24 NOTE — NURSING NOTE
"Due to an oversight in my recording, this visit note is entered late. I attest that what is written here is a factual summary of this visit, supported by additional data collection in addition to memory.    LATOSHA returned to the [facility] clinic on this date for a \"Day 2\" brief check to his injection site, assessment for adverse events, and for his ~24-hours post-dose PK blood sample.    The injection site was unremarkable. Only a tiny red speck, < 1mm diameter, with no visible swelling, induration, pain, redness, bleeding, rash, drainage, bruising, or other visible or reported concern was present.    LATOSHA reported feeling tired when asked how he was doing. We will monitor this, in light of a potential reaction or adverse event, versus his baseline including a chronically fatigued and untreated pre-diabetic vs. new diabetic status, and pended evaluation for obstructive sleep apnea. He also mentioned that his GERD seemed to be increased. We have discussed his seeking additional health care to address these baseline concerns. They will otherwise be tracked as potential adverse events.    LATOSHA requested, if possible, that we would use different ECG electrode pads going forward, as he found the ones that we used were irritating. There were no visible signs of skin irritation such as redness, rash appearance, bumps, etc. either on their removal or on inspection this date, but he reported the areas had a bumpy feel and were sore when he got home after his \"Day 1\" visit, and that he had especially noticed this when showering. There is no basis on which we would doubt his report. His request for differing electrodes will be accommodated (or alternatively, barrier wipe and/or an adhesive removal product use will be attempted) during ECGs for him going forward, as these other options are available and we would prefer to minimize anyone's discomfort.    Please do not hesitate to contact the study team with any questions or " concerns.    Martha Holder BA, RN, PHN, CCRC  (Research Nurse Coordinator)  Office - [phone]  Pager -  [phone]    Carlo Hernandez MD, PhD  ()  Office - [phone]  Pager - [phone]    Neil Ayala MD  (Co-Investigator)  Office - [phone]  Pager: [phone]

## 2023-02-28 ENCOUNTER — ALLIED HEALTH/NURSE VISIT (OUTPATIENT)
Dept: RESEARCH | Facility: CLINIC | Age: 38
End: 2023-02-28

## 2023-02-28 DIAGNOSIS — Z00.6 PATIENT IN CLINICAL RESEARCH STUDY: Primary | ICD-10-CM

## 2023-02-28 NOTE — NURSING NOTE
"[facility] for [facility] \"Week 24\" visit for the AROAPOC3-2001 Clinical Trial today.    Updated consent documents for the study were signed soon after his arrival at approximately 10:45 am.    Prior to his signature being requested, we reviewed the recently released safety information (dated 07-NOV-2022) again and the related study and/or consent wording updates that had precipitated the new consent document signature request, using a redline copy of each consent so that changes were visible and could be discussed more specifically. We have discussed the recent safety findings with the participant previously. These (in summary) consist of a concern for potential elevation of HbA1c readings, a reflection of glycemic control, for which participants with pre-existing diabetes who were also assigned to the highest dose level appear to be at greater risk. Dr. Hernandez was present both to examine the participant and to answer any questions he may have, asking about this specifically. Per our usual process, however, the physician PI (who is also the participant's caregiver) was not present during, or a part of, the research re-consent process per se.    Each section of the main consent was reviewed with the participant candidate, using the redlined document in addition to the blank documents awaiting signature, to ensure adequate understanding. This process was repeated for the full PK sub-study consent. Within the main study consent, the participant indicated his agreement to optional genetic testing and also to allowing his biosamples to be retained by the study sponsor for future research, as he had before, when initial consent to participate in the AROAPOC3-2001 study was signed.    Risks in light of this participant's health status, specifically, were discussed with the participant, with him noting that both doses of study medication had already been given at this point but that he may fall into the group of " "participants potentially at elevated risk. The overall timeline of the study, medication administrations, visit expectations, and option for rollover into the open-label extension study (and how that would work) was further explored, including that evaluation of his ongoing health and safety was a part of this process.    The participant was given time, and was asked if he had additional questions. LATOSHA stated that he would re-read the consent documents in greater detail when he got home, and that he would contact us if he did.    Copies of both signed consent documents were provided to the participant and he was invited to contact us via phone or email with any additional questions of concerns.     All components of the \"Week 24\" visit for this participant proceeded per protocol expectations and without incident after this. A medication refill request for his Lovaza was noted for further follow-up.    Please do not hesitate to contact the study team with any questions or concerns:     MIL Duran, RN, PHN, CCRC, CPT  (Research Nurse Coordinator)  Office - [phone]  Pager - [phone]    Asim Joiner  ()  Office - [phone]  Pager - [phone]      Carlo Hernandez MD, PhD  ()  Office - [phone]  Pager - [phone]    Neil Ayala MD  (Co-Investigator)  Office - [phone]  Pager: [phone]        "

## 2023-03-01 DIAGNOSIS — E78.1 HYPERTRIGLYCERIDEMIA: Primary | ICD-10-CM

## 2023-03-01 DIAGNOSIS — E78.1 HYPERTRIGLYCERIDEMIA: ICD-10-CM

## 2023-03-01 RX ORDER — OMEGA-3-ACID ETHYL ESTERS 1 G/1
2 CAPSULE, LIQUID FILLED ORAL
Qty: 360 CAPSULE | Refills: 3 | COMMUNITY
Start: 2023-03-01

## 2023-03-01 RX ORDER — OMEGA-3-ACID ETHYL ESTERS 1 G/1
2 CAPSULE, LIQUID FILLED ORAL 2 TIMES DAILY
Qty: 360 CAPSULE | Refills: 3 | COMMUNITY
Start: 2023-03-01 | End: 2023-03-01

## 2023-03-01 NOTE — PROGRESS NOTES
Patient has been examined by Dr. Hernandez and appropriate lab tests have been conducted and monitored. This previously-prescribed medication is to remain stable throughout research participation but it is not per se a part of the research protocol and cannot be billed as such to the project. It is an essential part of the patient's health maintenance.

## 2023-04-09 ENCOUNTER — HEALTH MAINTENANCE LETTER (OUTPATIENT)
Age: 38
End: 2023-04-09

## 2023-05-22 ENCOUNTER — TELEPHONE (OUTPATIENT)
Dept: RESEARCH | Facility: CLINIC | Age: 38
End: 2023-05-22

## 2023-06-24 ENCOUNTER — MYC REFILL (OUTPATIENT)
Dept: FAMILY MEDICINE | Facility: CLINIC | Age: 38
End: 2023-06-24

## 2023-06-24 DIAGNOSIS — E78.1 HYPERTRIGLYCERIDEMIA: ICD-10-CM

## 2023-06-26 NOTE — TELEPHONE ENCOUNTER
omega-3 acid ethyl esters (LOVAZA) 1 g capsule      Last Written Prescription Date:  8-6-22   Last Fill Quantity: 120,   # refills: 0  omega-3 acid ethyl esters (LOVAZA) 1 g capsule   3-1-23 ( on active med list)2nd  Medication: HISTORICAL  Last Office Visit : 10-1-20 ( RTC 6 M)  Future Office visit:  none    Routing refill request to provider for review/approval because:  Medication is reported/historical  Gap in RF  Overdue appt  Overdue lab: lipid panel,alt    fenofibrate (TRICOR) 145 MG tablet      Last Written Prescription Date:  8-6-22  Last Fill Quantity: 120,   # refills: 0  Routing refill request to provider for review/approval because:  Gap in RF  Overdue appt  Overdue lab: lipid panel      Patient Comment: Need to  Monday morning please

## 2023-07-02 ENCOUNTER — HEALTH MAINTENANCE LETTER (OUTPATIENT)
Age: 38
End: 2023-07-02

## 2023-07-07 NOTE — TELEPHONE ENCOUNTER
LATOSHA Carlos has been seen regularly by Dr. Hernandez as a part of his research participation. His lipids and also multiple safety labs are monitored regularly, approximately every 4 - 12 weeks, as a part of the AROAPOC3-2001 clinical trial, which will roll over to an open label extension for an additional 2 years beginning August 2023. Lipid test results are to remain blinded except in the case of an urgent medical need for unblinding, and lipid-lowering medications are to remain stable unless a medical need for them to be changed is revealed. Thank you.    Please call me directly if you have questions: Martha Holder, [phone] or [phone] (cell).

## 2023-07-10 RX ORDER — FENOFIBRATE 145 MG/1
145 TABLET, COATED ORAL 2 TIMES DAILY
Qty: 180 TABLET | Refills: 0 | Status: SHIPPED | OUTPATIENT
Start: 2023-07-10 | End: 2024-07-09

## 2023-07-10 RX ORDER — OMEGA-3-ACID ETHYL ESTERS 1 G/1
2 CAPSULE, LIQUID FILLED ORAL 2 TIMES DAILY
Qty: 360 CAPSULE | Refills: 0 | Status: SHIPPED | OUTPATIENT
Start: 2023-07-10 | End: 2024-07-09

## 2023-08-07 ENCOUNTER — ALLIED HEALTH/NURSE VISIT (OUTPATIENT)
Dept: RESEARCH | Facility: CLINIC | Age: 38
End: 2023-08-07

## 2023-08-07 DIAGNOSIS — Z00.6 PATIENT IN CLINICAL RESEARCH STUDY: Primary | ICD-10-CM

## 2023-08-09 ENCOUNTER — TELEPHONE (OUTPATIENT)
Dept: RESEARCH | Facility: CLINIC | Age: 38
End: 2023-08-09

## 2023-08-09 NOTE — TELEPHONE ENCOUNTER
Per request, called to share research lab results with the participant.    HgbA1c = 7.1%    Other results are available on request. Due to potential differences in reference ranges, upper limits of normal, and/or units used, results from the [facility] where research samples for this study are tested are not routinely reported alongside other lab results in Epic.A direct comparison may not be valid.    Please do not hesitate to contact the study team with any questions or concerns:    Martha Holder BA, RN, PHN, CCRC, CPT  (Research Nurse Coordinator)  Office - [phone]  Pager - [phone]     Jane Hunt) [facility]  Clinical Research Coordinator  Office - [phone]      Carlo Hernandez MD, PhD  ()  Office - [phone]  Pager - [phone]    Neil Ayala MD  (Co-Investigator)  Office - [phone]  Pager: [phone]

## 2023-08-14 ENCOUNTER — ALLIED HEALTH/NURSE VISIT (OUTPATIENT)
Dept: RESEARCH | Facility: CLINIC | Age: 38
End: 2023-08-14

## 2023-08-14 DIAGNOSIS — E78.1 HYPERTRIGLYCERIDEMIA: ICD-10-CM

## 2023-08-14 DIAGNOSIS — Z00.6 EXAMINATION OF PARTICIPANT OR CONTROL IN CLINICAL RESEARCH: Primary | ICD-10-CM

## 2023-09-10 ENCOUNTER — HEALTH MAINTENANCE LETTER (OUTPATIENT)
Age: 38
End: 2023-09-10

## 2023-09-29 DIAGNOSIS — E78.1 HYPERTRIGLYCERIDEMIA: ICD-10-CM

## 2023-09-29 NOTE — LETTER
[facility]  [address]  [facility] [facility] [address]  [phone]  October 2, 2023    Booker Quintana  [address] [facility] [address]    Dear Booker,    We care about your health and have reviewed your health plan including your medical conditions, medication list, and lab results.  Based on this review, it is recommended that you follow up regarding the following health topic(s):     -Wellness (Physical) Visit and medication refills - Appointment needed!    Please call us at [phone] (or use [employer]) to schedule an appt to address the above recommendations.     Thank you for trusting [facility] and we appreciate the opportunity to serve you.  We look forward to supporting your healthcare needs in the future.    Healthy Regards,      Your Health Care Team  [facility]

## 2023-10-02 RX ORDER — FENOFIBRATE 145 MG/1
145 TABLET, COATED ORAL 2 TIMES DAILY
OUTPATIENT
Start: 2023-10-02

## 2023-10-02 NOTE — TELEPHONE ENCOUNTER
fenofibrate (TRICOR) 145 MG tablet 180 tablet 0 7/10/2023     Last Office Visit : 4/12/21  Future Office visit:  none    Routing refill request to provider for review/approval because:  Lipid panel overdue & annual visit overdue    Lab Results   Component Value Date    CHOL 171 03/31/2021     Lab Results   Component Value Date    HDL 26 03/31/2021     Lab Results   Component Value Date    LDL  03/31/2021     Cannot estimate LDL when triglyceride exceeds 400 mg/dL    LDL 65 03/31/2021     Lab Results   Component Value Date    TRIG 865 03/31/2021     No results found for: CHOLHDLRATIO

## 2023-10-12 ENCOUNTER — ALLIED HEALTH/NURSE VISIT (OUTPATIENT)
Dept: RESEARCH | Facility: CLINIC | Age: 38
End: 2023-10-12

## 2023-10-12 DIAGNOSIS — Z00.6 EXAMINATION OF PARTICIPANT OR CONTROL IN CLINICAL RESEARCH: Primary | ICD-10-CM

## 2023-10-12 NOTE — NURSING NOTE
"[facility] for [facility] \"Month 2 OLE\" Visit for the AROPAOC3-2003 clinical trial (open-label extension study).LATOSHA looks, and reports feeling, well. His blood was drawn for ongoing monitoring done as a part of the study. Dr. Hernandez did not directly attend the visit, but has reviewed all labs both previously and in my presence today, and spoke with LATOSHA on the phone afterward at length, encouraging his efforts towards improved health.  "

## 2023-11-14 DIAGNOSIS — Z00.6 EXAMINATION OF PARTICIPANT OR CONTROL IN CLINICAL RESEARCH: Primary | ICD-10-CM

## 2024-01-28 ENCOUNTER — HEALTH MAINTENANCE LETTER (OUTPATIENT)
Age: 39
End: 2024-01-28

## 2024-02-06 ENCOUNTER — ALLIED HEALTH/NURSE VISIT (OUTPATIENT)
Dept: RESEARCH | Facility: CLINIC | Age: 39
End: 2024-02-06

## 2024-02-06 DIAGNOSIS — Z00.6 EXAMINATION OF PARTICIPANT OR CONTROL IN CLINICAL RESEARCH: Primary | ICD-10-CM

## 2024-02-06 DIAGNOSIS — E78.1 HYPERTRIGLYCERIDEMIA: ICD-10-CM

## 2024-05-22 ENCOUNTER — TRANSFERRED RECORDS (OUTPATIENT)
Dept: HEALTH INFORMATION MANAGEMENT | Facility: CLINIC | Age: 39
End: 2024-05-22

## 2024-05-22 ENCOUNTER — ALLIED HEALTH/NURSE VISIT (OUTPATIENT)
Dept: CARDIOLOGY | Facility: CLINIC | Age: 39
End: 2024-05-22

## 2024-05-22 DIAGNOSIS — Z00.6 EXAMINATION OF PARTICIPANT OR CONTROL IN CLINICAL RESEARCH: Primary | ICD-10-CM

## 2024-05-22 DIAGNOSIS — E78.1 HYPERTRIGLYCERIDEMIA: ICD-10-CM

## 2024-05-23 VITALS
RESPIRATION RATE: 20 BRPM | BODY MASS INDEX: 37.24 KG/M2 | TEMPERATURE: 97.9 F | WEIGHT: 248.5 LBS | SYSTOLIC BLOOD PRESSURE: 131 MMHG | HEART RATE: 65 BPM | DIASTOLIC BLOOD PRESSURE: 89 MMHG | OXYGEN SATURATION: 98 %

## 2024-05-23 NOTE — NURSING NOTE
"Dr. Hernandez and I saw LATOSHA in [facility] for his \"Month-9\" visit, in relation to his participation in the open-label extension portion of a phase-2b study examining the new medication Plosasiran for the treatment of severe hypertriglyceridemia.    Vital signs taken at his visit are recorded above.    An ECG recorded in the semi-supine position was within normal limits: VR 65 bpm, AI=533 ms, OF=332 ms, QRS=96 ms, OB=590 ms (available in the study record).    We reviewed his ongoing stable diet, medications (unchanged), therapies/procedures (none new), alcohol intake (minimal, decreased), and any adverse events (none except for a mild stuffy nose, felt by him to be related to [place] during [place]).    All study-measured, historical and recent lab values were printed and reviewed for their clinical significance, in particular, historical and recent HbA1c measures in light of his pre-existing type-2 diabetes, and the importance of maintaining adequate blood glucose control.     LATOSHA has maintained a relatively stable weight and is overall active in his lifestyle, but reports this is slightly less in his work recently. He experiences ongoing fatigue, chronic GERD, and states that he has not yet had a sleep study to evaluate his concern with snoring.    Dr. Hernandez examined LATOSHA in person and spoke with him at length. We await lab results from this visit prior to any further recommendations.    Study medication, MAGGIE-APOC3 25 mg by subcutaneous injection (Left mid-lower abdomen) was given at 10:49 AM, without incidence or any adverse reactions. LATOSHA has been in the active treatment group, receiving this drug for his severe hypertriglyceridemia since his participation in the double-blind study that preceded this open-label extension.    Please don not hesitate to contact us with any further concerns, in particular with respect to LATOSHA's clinical trial participation.    Martha Holder, BA, RN, PHN, " CCRC, CPT  (Research Nurse Coordinator)  Office - [phone]  Pager - [phone]    Shreri Echeverria)  (Clinical research Coordinator)  Office - [phone]    Carlo Hernandez MD, PhD  ()  Office - [phone]  Pager - [phone]

## 2024-05-28 DIAGNOSIS — E11.9 DIABETES MELLITUS, TYPE 2 (H): ICD-10-CM

## 2024-05-29 DIAGNOSIS — E11.9 DIABETES MELLITUS, TYPE 2 (H): ICD-10-CM

## 2024-06-16 ENCOUNTER — HEALTH MAINTENANCE LETTER (OUTPATIENT)
Age: 39
End: 2024-06-16

## 2024-07-09 ENCOUNTER — OFFICE VISIT (OUTPATIENT)
Dept: FAMILY MEDICINE | Facility: CLINIC | Age: 39
End: 2024-07-09

## 2024-07-09 ENCOUNTER — MYC MEDICAL ADVICE (OUTPATIENT)
Dept: FAMILY MEDICINE | Facility: CLINIC | Age: 39
End: 2024-07-09

## 2024-07-09 VITALS
OXYGEN SATURATION: 96 % | BODY MASS INDEX: 36.99 KG/M2 | WEIGHT: 244.1 LBS | TEMPERATURE: 97.2 F | SYSTOLIC BLOOD PRESSURE: 145 MMHG | HEART RATE: 69 BPM | RESPIRATION RATE: 16 BRPM | DIASTOLIC BLOOD PRESSURE: 101 MMHG | HEIGHT: 68 IN

## 2024-07-09 DIAGNOSIS — E11.69 TYPE 2 DIABETES MELLITUS WITH OTHER SPECIFIED COMPLICATION, WITHOUT LONG-TERM CURRENT USE OF INSULIN (H): Primary | ICD-10-CM

## 2024-07-09 DIAGNOSIS — E66.01 MORBID OBESITY (H): ICD-10-CM

## 2024-07-09 DIAGNOSIS — E78.1 HYPERTRIGLYCERIDEMIA: ICD-10-CM

## 2024-07-09 DIAGNOSIS — K85.10 ACUTE BILIARY PANCREATITIS, UNSPECIFIED COMPLICATION STATUS: ICD-10-CM

## 2024-07-09 PROBLEM — E11.9 DIABETES MELLITUS, TYPE 2 (H): Status: RESOLVED | Noted: 2021-04-12 | Resolved: 2024-07-09

## 2024-07-09 LAB
HBA1C MFR BLD: 7.6 % (ref 0–5.6)
HOLD SPECIMEN: NORMAL

## 2024-07-09 PROCEDURE — 36415 COLL VENOUS BLD VENIPUNCTURE: CPT | Performed by: FAMILY MEDICINE

## 2024-07-09 PROCEDURE — 99203 OFFICE O/P NEW LOW 30 MIN: CPT | Performed by: FAMILY MEDICINE

## 2024-07-09 PROCEDURE — 83036 HEMOGLOBIN GLYCOSYLATED A1C: CPT | Performed by: FAMILY MEDICINE

## 2024-07-09 RX ORDER — METFORMIN HCL 500 MG
2000 TABLET, EXTENDED RELEASE 24 HR ORAL
Qty: 120 TABLET | Refills: 3 | Status: SHIPPED | OUTPATIENT
Start: 2024-07-09

## 2024-07-09 RX ORDER — FENOFIBRATE 145 MG/1
145 TABLET, COATED ORAL DAILY
COMMUNITY
Start: 2024-07-09

## 2024-07-09 RX ORDER — LISINOPRIL 10 MG/1
10 TABLET ORAL DAILY
Qty: 90 TABLET | Refills: 0 | Status: SHIPPED | OUTPATIENT
Start: 2024-07-09

## 2024-07-09 NOTE — PROGRESS NOTES
"  Assessment & Plan     (E11.69) Type 2 diabetes mellitus with other specified complication, without long-term current use of insulin (H)  (primary encounter diagnosis)  Comment: He is very committed to staying in his clinical trial which requires good A1c data. Will increase his metformin from 1000 to 2000mg/day. Add in lisinopril and schedule close follow-up. All questions answered. The patient indicates understanding of these issues and agrees with the plan.   Plan: Hemoglobin A1c, metFORMIN (GLUCOPHAGE XR) 500         MG 24 hr tablet, lisinopril (ZESTRIL) 10 MG         tablet, blood glucose monitoring (NO BRAND         SPECIFIED) meter device kit            (E78.1) Hypertriglyceridemia  Comment: per clinical trial   Plan: fenofibrate (TRICOR) 145 MG tablet            (K85.10) Acute biliary pancreatitis, unspecified complication status  Comment: no current flare   Plan:     (E66.01) Obesity (BMI 35.0-39.9) with comorbidity (H)  Comment: he has cut out carbs, alcohol, and stopped smoking.   Plan:       Nicotine/Tobacco Cessation  He reports that he has been smoking cigarettes. He has never used smokeless tobacco.  Nicotine/Tobacco Cessation Plan  He has quit smoking       BMI  Estimated body mass index is 37 kg/m  as calculated from the following:    Height as of this encounter: 1.73 m (5' 8.11\").    Weight as of this encounter: 110.7 kg (244 lb 1.6 oz).   Weight management plan: Discussed healthy diet and exercise guidelines      Subjective   LATOSHA is a 38 year old, presenting for the following health issues:  Establish Care and Diabetes        7/9/2024    10:40 AM   Additional Questions   Roomed by ANABELLA Read   Accompanied by self     History of Present Illness       Reason for visit:  Diabetes  Symptom onset:  More than a month  Symptom intensity:  Mild  Symptom progression:  Improving  Had these symptoms before:  No  What makes it worse:  Sugar    He eats 2-3 servings of fruits and vegetables daily.He consumes " "0 sweetened beverage(s) daily.He exercises with enough effort to increase his heart rate 10 to 19 minutes per day.  He exercises with enough effort to increase his heart rate 3 or less days per week.   He is taking medications regularly.     In a clinical study for triglycerides, prescribed fenofibrate & fish oil from Dr Hernandez.      Was started on metformin due to elevated A1c, if he is unable to lower A1c he would get discharged from study.     Looking for provider to manage his diabetes.     A1c 8.4 may 2024 - was started on metformin at 500mg twice a day   Doesn't have a glucose meter     Changed his diet - low sugar/low carbs     Family hx of diabetes= maternal uncle.     Last eye exam 8 years ago when he had lasix.     BP Readings from Last 6 Encounters:   07/09/24 (!) 143/98   05/22/24 131/89   03/31/21 128/60   01/20/20 128/70   09/03/19 120/70   07/01/19 104/80     Lab Results   Component Value Date    A1C 7.6 07/09/2024    A1C 6.5 04/07/2021    A1C 5.6 03/28/2019    A1C 5.7 03/20/2019         Follow-up appointment is 8/13       Review of Systems  Constitutional, HEENT, cardiovascular, pulmonary, gi and gu systems are negative, except as otherwise noted.      Objective    BP (!) 143/98   Pulse 69   Temp 97.2  F (36.2  C) (Tympanic)   Resp 16   Ht 1.73 m (5' 8.11\")   Wt 110.7 kg (244 lb 1.6 oz)   SpO2 96%   BMI 37.00 kg/m    Body mass index is 37 kg/m .  Physical Exam   GENERAL: alert and no distress  EYES: Eyes grossly normal to inspection, PERRL and conjunctivae and sclerae normal  HENT: ear canals and TM's normal, nose and mouth without ulcers or lesions  NECK: no adenopathy, no asymmetry, masses, or scars  RESP: lungs clear to auscultation - no rales, rhonchi or wheezes  CV: regular rate and rhythm, normal S1 S2, no S3 or S4, no murmur, click or rub, no peripheral edema  MS: no gross musculoskeletal defects noted, no edema  SKIN: no suspicious lesions or rashes  NEURO: Normal strength and tone, " mentation intact and speech normal  PSYCH: mentation appears normal, affect normal/bright          Signed Electronically by: Yoly St MD

## 2024-08-06 ENCOUNTER — OFFICE VISIT (OUTPATIENT)
Dept: FAMILY MEDICINE | Facility: CLINIC | Age: 39
End: 2024-08-06

## 2024-08-06 VITALS
WEIGHT: 243.2 LBS | SYSTOLIC BLOOD PRESSURE: 132 MMHG | HEART RATE: 80 BPM | OXYGEN SATURATION: 96 % | TEMPERATURE: 97.8 F | RESPIRATION RATE: 16 BRPM | BODY MASS INDEX: 36.86 KG/M2 | DIASTOLIC BLOOD PRESSURE: 98 MMHG

## 2024-08-06 DIAGNOSIS — E78.1 HYPERTRIGLYCERIDEMIA: ICD-10-CM

## 2024-08-06 DIAGNOSIS — K85.10 ACUTE BILIARY PANCREATITIS, UNSPECIFIED COMPLICATION STATUS: ICD-10-CM

## 2024-08-06 DIAGNOSIS — I10 BENIGN ESSENTIAL HYPERTENSION: ICD-10-CM

## 2024-08-06 DIAGNOSIS — E11.69 TYPE 2 DIABETES MELLITUS WITH OTHER SPECIFIED COMPLICATION, WITHOUT LONG-TERM CURRENT USE OF INSULIN (H): Primary | ICD-10-CM

## 2024-08-06 PROBLEM — E11.9 DIABETES MELLITUS, TYPE 2 (H): Status: ACTIVE | Noted: 2024-08-06

## 2024-08-06 PROBLEM — E11.9 DIABETES MELLITUS, TYPE 2 (H): Status: RESOLVED | Noted: 2024-08-06 | Resolved: 2024-08-06

## 2024-08-06 LAB
CHOLEST SERPL-MCNC: 190 MG/DL
HBA1C MFR BLD: 7.3 % (ref 0–5.6)
HDLC SERPL-MCNC: 43 MG/DL
HOLD SPECIMEN: NORMAL
LDLC SERPL CALC-MCNC: 120 MG/DL
NONHDLC SERPL-MCNC: 147 MG/DL
TRIGL SERPL-MCNC: 133 MG/DL

## 2024-08-06 PROCEDURE — 99214 OFFICE O/P EST MOD 30 MIN: CPT | Performed by: FAMILY MEDICINE

## 2024-08-06 PROCEDURE — 80061 LIPID PANEL: CPT | Performed by: FAMILY MEDICINE

## 2024-08-06 PROCEDURE — 36415 COLL VENOUS BLD VENIPUNCTURE: CPT | Performed by: FAMILY MEDICINE

## 2024-08-06 PROCEDURE — 83036 HEMOGLOBIN GLYCOSYLATED A1C: CPT | Performed by: FAMILY MEDICINE

## 2024-08-06 RX ORDER — GLIPIZIDE 10 MG/1
10 TABLET ORAL
Qty: 90 TABLET | Refills: 0 | Status: SHIPPED | OUTPATIENT
Start: 2024-08-06

## 2024-08-06 NOTE — Clinical Note
Please call him.I'm sending in glipizide today and he can get started on this.  I have messaged his trial team and will reassess our treatment plan once I speak with them. For now, start glipizide daily.

## 2024-08-06 NOTE — PROGRESS NOTES
Assessment & Plan     (E11.69) Type 2 diabetes mellitus with other specified complication, without long-term current use of insulin (H)  (primary encounter diagnosis)  Comment: improvement noted. Will consider GLP1 inhibitor - message out to his team. For now, will add in glipizide daily. Should be checking blood sugars daily. Continue metformin 2000/day. Continue to work on daily exercise and diet changes. He is interested in seeing diabetic education - having trouble with the carb counting in diabetes. See me in three months. The patient indicates understanding of these issues and agrees with the plan.   Plan: Albumin Random Urine Quantitative with Creat         Ratio, Hemoglobin A1c, Adult Diabetes Education         Referral            (E78.1) Hypertriglyceridemia  Comment: in a clinical trial for high cholesterol. Follows with them next week. I will message them in support of him being able to continue in the trial  Plan: Lipid panel reflex to direct LDL Non-fasting,         Hemoglobin A1c, Adult Diabetes Education          Referral            (I10) Benign essential hypertension  Comment: he didn't start on the lisinopril because he thought it was a statin. He will start and check bmp in 2 weeks. The patient indicates understanding of these issues and agrees with the plan.   Plan:     (K85.10) Acute biliary pancreatitis, unspecified complication status  Comment: secondary to his high triglycerides.   Plan:       Liam REINA is a 38 year old, presenting for the following health issues:  Diabetes        8/6/2024    11:01 AM   Additional Questions   Roomed by ANABELLA Read   Accompanied by self     History of Present Illness       Diabetes:   He presents for follow up of diabetes.    He is not checking blood glucose.        He is concerned about other.    He is not experiencing numbness or burning in feet, excessive thirst, blurry vision, weight changes or redness, sores or blisters on  feet. The patient has not had a diabetic eye exam in the last 12 months.         Has no insurance       Was seen on 7/9/24 for initial visit.   Is in a clinical trial for cholesterol and needed A1c to come down to less than 7  Has not been checking blood sugars at home.      A1c was 8.6 :  may 2024     Lab Results   Component Value Date    A1C 7.3 08/06/2024    A1C 7.6 07/09/2024    A1C 6.5 04/07/2021    A1C 5.6 03/28/2019    A1C 5.7 03/20/2019      At our last visit, we doubled the metformin from 1000 to 2000   Loose stools ongoing but not too troubling      Hypertension   Never started lisinopril - he thought it was a statin    BP Readings from Last 6 Encounters:   08/06/24 (!) 132/98   07/09/24 (!) 145/101   05/22/24 131/89   03/31/21 128/60   01/20/20 128/70   09/03/19 120/70         Review of Systems  Constitutional, HEENT, cardiovascular, pulmonary, gi and gu systems are negative, except as otherwise noted.      Objective    BP (!) 132/98   Pulse 80   Temp 97.8  F (36.6  C) (Tympanic)   Resp 16   Wt 110.3 kg (243 lb 3.2 oz)   SpO2 96%   BMI 36.86 kg/m    Body mass index is 36.86 kg/m .  Physical Exam   Pt is alert and oriented in no acute distress.  Affect is appropriate. Good eye contact.  Diabetic foot exam: normal DP and PT pulses, no trophic changes or ulcerative lesions, and normal sensory exam          Signed Electronically by: Yoly St MD

## 2024-08-13 ENCOUNTER — ALLIED HEALTH/NURSE VISIT (OUTPATIENT)
Dept: EMERGENCY MEDICINE | Facility: CLINIC | Age: 39
End: 2024-08-13

## 2024-08-13 DIAGNOSIS — Z00.6 PATIENT IN CLINICAL RESEARCH STUDY: Primary | ICD-10-CM

## 2024-08-13 ASSESSMENT — PAIN SCALES - GENERAL: PAINLEVEL: NO PAIN (0)

## 2024-08-14 VITALS
DIASTOLIC BLOOD PRESSURE: 85 MMHG | WEIGHT: 243.39 LBS | SYSTOLIC BLOOD PRESSURE: 122 MMHG | BODY MASS INDEX: 36.89 KG/M2 | RESPIRATION RATE: 16 BRPM | HEART RATE: 71 BPM | TEMPERATURE: 97.5 F

## 2024-08-14 NOTE — PROGRESS NOTES
"[facility] for his \"Month 12 - OLE\" visit for the AROAPOC3-2003, open-label extension study this morning.    All study-related assessments were completed per protocol. There have been no adverse events or new procedures/hospitalizations since his last visit with us. Medications were reviewed with LATOSHA in person and new medications and their start dates were recorded in the study record.    LATOSHA was feeling well at this visit and expressed that he is pleased with his new care provider, Dr. St, who is helping him to better manage his type 2 diabetes and overall health. An inquiry regarding his possibly starting on a GLP-1 inhibitor as an effective strategy towards this is under consideration.      "

## 2024-08-25 ENCOUNTER — HEALTH MAINTENANCE LETTER (OUTPATIENT)
Age: 39
End: 2024-08-25

## 2024-10-28 DIAGNOSIS — Z00.6 EXAMINATION OF PARTICIPANT OR CONTROL IN CLINICAL RESEARCH: Primary | ICD-10-CM

## 2024-10-29 DIAGNOSIS — E11.69 TYPE 2 DIABETES MELLITUS WITH OTHER SPECIFIED COMPLICATION, WITHOUT LONG-TERM CURRENT USE OF INSULIN (H): ICD-10-CM

## 2024-10-29 DIAGNOSIS — E78.1 HYPERTRIGLYCERIDEMIA: ICD-10-CM

## 2024-10-30 ENCOUNTER — HOSPITAL ENCOUNTER (EMERGENCY)
Dept: HOSPITAL 77 - KA.ED | Age: 39
Discharge: LEFT BEFORE DISCHARGE | End: 2024-10-30
Payer: SELF-PAY

## 2024-10-30 DIAGNOSIS — Z53.21: Primary | ICD-10-CM

## 2024-10-30 RX ORDER — GLIPIZIDE 10 MG/1
10 TABLET ORAL
Qty: 90 TABLET | Refills: 0 | Status: SHIPPED | OUTPATIENT
Start: 2024-10-30

## 2024-11-01 NOTE — TELEPHONE ENCOUNTER
fenofibrate (TRICOR) 145 MG tablet   Historical    omega-3 acid ethyl esters (LOVAZA) 1 g capsule 360 capsule 3 3/1/2023     Component      Latest Ref Rng 8/6/2024  11:08 AM   Cholesterol      <200 mg/dL 190    Triglycerides      <150 mg/dL 133    HDL Cholesterol      >=40 mg/dL 43    LDL Cholesterol Calculated      <=100 mg/dL 120 (H)    Non HDL Cholesterol      <130 mg/dL 147 (H)        Last Office Visit: 10/1/20  Future Office visit:  none    Routing refill request to provider for review/approval because:  OVERDUE > 18 MONTHS FOLLOW UP    Noelle Sinclair RN  P Central Nursing/Red Flag Triage & Med Refill Team

## 2024-11-04 DIAGNOSIS — E78.1 HYPERTRIGLYCERIDEMIA: ICD-10-CM

## 2024-11-04 RX ORDER — FENOFIBRATE 145 MG/1
145 TABLET, COATED ORAL 2 TIMES DAILY
Qty: 180 TABLET | Refills: 3 | OUTPATIENT
Start: 2024-11-04

## 2024-11-04 RX ORDER — OMEGA-3-ACID ETHYL ESTERS 1 G/1
2 CAPSULE, LIQUID FILLED ORAL 2 TIMES DAILY
Qty: 360 CAPSULE | Refills: 3 | OUTPATIENT
Start: 2024-11-04

## 2024-11-05 RX ORDER — OMEGA-3-ACID ETHYL ESTERS 1 G/1
2 CAPSULE, LIQUID FILLED ORAL 2 TIMES DAILY
Qty: 360 CAPSULE | Refills: 3 | Status: SHIPPED | OUTPATIENT
Start: 2024-11-05

## 2024-11-05 NOTE — TELEPHONE ENCOUNTER
Requested Prescriptions   Pending Prescriptions Disp Refills    fenofibrate (TRICOR) 145 MG tablet [Pharmacy Med Name: fenofibrate nanocrystallized 145 mg tablet] 180 tablet 3     Sig: TAKE 1 TABLET BY MOUTH TWICE DAILY           High dose warning comes up upon signing RX        Antihyperlipidemic agents Passed - 11/5/2024  9:53 AM        Passed - LDL on file in the past 12 months        Passed - Medication is active on med list        Passed - Recent (12 mo) or future (90 days) visit within the authorizing provider's specialty     The patient must have completed an in-person or virtual visit within the past 12 months or has a future visit scheduled within the next 90 days with the authorizing provider s specialty.  Urgent care and e-visits do not quality as an office visit for this protocol.          Passed - Patient is age 18 years or older

## 2024-11-06 RX ORDER — FENOFIBRATE 145 MG/1
145 TABLET, COATED ORAL 2 TIMES DAILY
Qty: 180 TABLET | Refills: 3 | Status: SHIPPED | OUTPATIENT
Start: 2024-11-06

## 2024-12-29 ENCOUNTER — HEALTH MAINTENANCE LETTER (OUTPATIENT)
Age: 39
End: 2024-12-29

## 2025-01-28 ENCOUNTER — ALLIED HEALTH/NURSE VISIT (OUTPATIENT)
Dept: CARDIOLOGY | Facility: CLINIC | Age: 40
End: 2025-01-28

## 2025-01-28 DIAGNOSIS — E78.1 HYPERTRIGLYCERIDEMIA: ICD-10-CM

## 2025-01-28 DIAGNOSIS — Z00.6 PATIENT IN CLINICAL RESEARCH STUDY: Primary | ICD-10-CM

## 2025-01-28 NOTE — PROGRESS NOTES
"[facility] for [facility] \"Month 18\" visit for study IDS # [id number] this morning.    Re-consent for a protocol v. 3, using consent update v. 12.18. 2024 was conducted prior to other study activities, see below.    Assessments and other information gathered at the visit are recorded in the study data, including a physical exam by PI Carlo Hernandez MD, PhD.    Study medication (Plozasiran, IDS # [id number]) was administered via pre-filled syringe as planned, without incident or any visible or reported side effects, after all other assessments (including blood draw) were completed. The participant declined self-administration, despite consenting to this on the updated informed consent document. We walked through the administration process verbally as it was done.    Study Title: A PHASE 2 OPEN-LABEL EXTENSION STUDY TO EVALUATE THE LONG-TERM SAFETY AND EFFICACY OF MAGGIE-APOC3 IN ADULTS WITH DYSLIPIDEMIA     [facility] IRB # [id number]  PI: Carlo Hernandez MD, PhD  Research Nurse Coordinator: Martha Holder RN - Phone: [phone] Pager: [phone]  : Sherri Aburto, CRC - Phone [phone]    Estimated Study Duration: 2 years    Our scheduled visit in [facility] Unit (\"[facility]\") began with the participant's arrival. Myself (author) and the participant were present in the room during the informed consent process. Blank (unsigned) consent documents were provided to the participant and were in view as we discussed the study, including a review of a redline version of the consent update, the study design and status overall, and the expectations and study-related processes (in particular, use of a pre-filled syringe instead of an injection prepared in our research pharmacy) that would follow if consent was signed.    [x]  The participant was provided with the current IRB approved consent document to review prior to and/or during the consent discussion.  [x]  The consent " "form was explained by study staff and/or the study investigator.  [x]  The ongoing, voluntary nature of research participation was emphasized, and options for how to end participation were explained to the participant.  [x]  The participant was given adequate time to review the consent form and discuss the study with study investigators, study staff, and/or family members.  [x]  The participant was given the opportunity to ask questions before signing and any questions were answered to the participant's satisfaction.  [x]  The participant's comprehension of the study was assessed using open ended questions and/or a \"teach-back\" process.   [x]  The participant demonstrated an understanding of the study and participation details.    [x]  Contact information has been provided to the participant, including how to reach the study team or, if desired, the IRB or other help.  [x]  The participant signed and dated the current IRB approved consent form voluntarily, knowing that they could withdraw participation at any time.  [x]  The participant was provided a copy of the signed informed consent document(s).   (The participant's copy is being mailed to him with unblinded lab results)    Specific questions from the participant were as follows:   1. Why the syringe wasn't similar in design to an insulin pen for self-injection  2. What would his options for treatment of very high triglycerides be after the study   3. Whether the [employer] payment process would change   Following questions, answers, and explanations, the participant signed and dated the informed consent document(s), indicating their consent to participate before any study-related procedures took place.    Please do not hesitate to contact a study team member with any questions or concerns about this patient's participation in research:     MIL Duran, RN, PHN, CCRC, CPT  (Research Nurse Coordinator)  Office - [phone]  Pager - [phone]    Sherri " Lamonte  (Clinical Research Coordinator)  Office - [phone]      Lara (Maddy) [facility]  (Clinical Research Coordinator)  Office - [phone]    Carlo Hernandez MD, PhD  ()  Office - [phone]  Pager - [phone]

## 2025-02-11 ENCOUNTER — MYC MEDICAL ADVICE (OUTPATIENT)
Dept: FAMILY MEDICINE | Facility: CLINIC | Age: 40
End: 2025-02-11

## 2025-02-11 ENCOUNTER — OFFICE VISIT (OUTPATIENT)
Dept: FAMILY MEDICINE | Facility: CLINIC | Age: 40
End: 2025-02-11

## 2025-02-11 VITALS
HEART RATE: 85 BPM | WEIGHT: 248.2 LBS | TEMPERATURE: 97.4 F | DIASTOLIC BLOOD PRESSURE: 88 MMHG | BODY MASS INDEX: 37.62 KG/M2 | SYSTOLIC BLOOD PRESSURE: 126 MMHG | OXYGEN SATURATION: 96 %

## 2025-02-11 DIAGNOSIS — R06.83 SNORING: ICD-10-CM

## 2025-02-11 DIAGNOSIS — E11.9 TYPE 2 DIABETES MELLITUS WITHOUT COMPLICATION, WITHOUT LONG-TERM CURRENT USE OF INSULIN (H): Primary | ICD-10-CM

## 2025-02-11 DIAGNOSIS — G47.19 EXCESSIVE DAYTIME SLEEPINESS: ICD-10-CM

## 2025-02-11 DIAGNOSIS — E11.69 TYPE 2 DIABETES MELLITUS WITH OTHER SPECIFIED COMPLICATION, WITHOUT LONG-TERM CURRENT USE OF INSULIN (H): ICD-10-CM

## 2025-02-11 DIAGNOSIS — K85.10 ACUTE BILIARY PANCREATITIS, UNSPECIFIED COMPLICATION STATUS: ICD-10-CM

## 2025-02-11 DIAGNOSIS — Z28.21 VACCINATION REFUSED BY PATIENT: ICD-10-CM

## 2025-02-11 DIAGNOSIS — E66.01 MORBID OBESITY (H): ICD-10-CM

## 2025-02-11 DIAGNOSIS — E78.1 HYPERTRIGLYCERIDEMIA: ICD-10-CM

## 2025-02-11 PROCEDURE — 99214 OFFICE O/P EST MOD 30 MIN: CPT | Performed by: FAMILY MEDICINE

## 2025-02-11 RX ORDER — GLIPIZIDE 10 MG/1
10 TABLET ORAL
Qty: 180 TABLET | Refills: 3 | Status: SHIPPED | OUTPATIENT
Start: 2025-02-11

## 2025-02-11 RX ORDER — LISINOPRIL 10 MG/1
10 TABLET ORAL DAILY
Qty: 90 TABLET | Refills: 3 | Status: SHIPPED | OUTPATIENT
Start: 2025-02-11

## 2025-02-11 RX ORDER — METFORMIN HYDROCHLORIDE 500 MG/1
2000 TABLET, EXTENDED RELEASE ORAL
Qty: 120 TABLET | Refills: 3 | Status: SHIPPED | OUTPATIENT
Start: 2025-02-11

## 2025-02-11 RX ORDER — FENOFIBRATE 145 MG/1
145 TABLET, COATED ORAL DAILY
COMMUNITY
Start: 2025-02-11

## 2025-02-11 NOTE — PROGRESS NOTES
"  Assessment & Plan     (E11.9) Type 2 diabetes mellitus without complication, without long-term current use of insulin (H)  (primary encounter diagnosis)  (E11.69) Type 2 diabetes mellitus with other specified complication, without long-term current use of insulin (H)  Comment: will reach out to his team to see if we can trial GLP1 medication. Restarting his other meds today   Plan: metFORMIN (GLUCOPHAGE XR) 500 MG 24 hr tablet,         lisinopril (ZESTRIL) 10 MG tablet, glipiZIDE         (GLUCOTROL) 10 MG tablet     HEMOGLOBIN A1C, Albumin Random Urine         Quantitative with Creat Ratio, Adult Eye          Referral            (E78.1) Hypertriglyceridemia  Comment: in a lipid study  Plan: fenofibrate (TRICOR) 145 MG tablet            (R06.83) Snoring  Comment: referral made   Plan: Adult Sleep Eval & Management          Referral            (G47.19) Excessive daytime sleepiness  Comment: referral made   Plan: Adult Sleep Eval & Management          Referral          (E66.01) Obesity (BMI 35.0-39.9) with comorbidity (H)  Comment: discussed lifestyle changes   Plan:     (K85.10) Acute biliary pancreatitis, unspecified complication status  Comment: due to elevated lipids in the past. Currently in a lipid study   Plan:     (Z28.21) Vaccination refused by patient  Comment: refuses pneumonia, covid, flu   Plan:     Follow-up in three months      BMI  Estimated body mass index is 37.62 kg/m  as calculated from the following:    Height as of 7/9/24: 1.73 m (5' 8.11\").    Weight as of this encounter: 112.6 kg (248 lb 3.2 oz).   Weight management plan: Patient was referred to their PCP to discuss a diet and exercise plan.      Liam REINA is a 39 year old, presenting for the following health issues:  Diabetes      2/11/2025    10:37 AM   Additional Questions   Roomed by ANABELLA Read   Accompanied by Niece      History of Present Illness       Diabetes:   He presents for follow up of diabetes. "    He is not checking blood glucose.        He is concerned about frequent infections.    He is not experiencing numbness or burning in feet, excessive thirst, blurry vision, weight changes or redness, sores or blisters on feet. The patient has not had a diabetic eye exam in the last 12 months.         A1c 7.6 jan 2025   A1c 6.9 oct 2024     Ran out of meds a month ago  Metformin 2000/day  Glipizide 10 bid       Still in lipid trial at [facility]     Blood pressure :   Lisinopril 10  BP Readings from Last 6 Encounters:   02/11/25 (!) 124/100   08/13/24 122/85   08/06/24 (!) 132/98   07/09/24 (!) 145/101   05/22/24 131/89   03/31/21 128/60      Snores - sleeps poorly  Falls asleep during the day - at work   Hoping for a sleep study     Refuses all vaccines     Review of Systems  Constitutional, HEENT, cardiovascular, pulmonary, gi and gu systems are negative, except as otherwise noted.      Objective    /88   Pulse 85   Temp 97.4  F (36.3  C) (Tympanic)   Wt 112.6 kg (248 lb 3.2 oz)   SpO2 96%   BMI 37.62 kg/m    Body mass index is 37.62 kg/m .  Physical Exam   GENERAL - Pt is alert and oriented in no acute distress.  Affect is appropriate. Good eye contact.          Signed Electronically by: Yoly St MD

## 2025-02-12 ENCOUNTER — TELEPHONE (OUTPATIENT)
Dept: CARDIOLOGY | Facility: CLINIC | Age: 40
End: 2025-02-12

## 2025-02-12 DIAGNOSIS — Z00.6 PATIENT IN CLINICAL RESEARCH STUDY: Primary | ICD-10-CM

## 2025-02-13 NOTE — TELEPHONE ENCOUNTER
Per tressa vizcarra and dr hernandez :      Mo St,     Dr. Hernandez and I spoke with LATOSHA at length today about risk factors and the reasoning for concern on our end, his increased risk for pancreatitis in particular, and what this could mean for him. Above all, that no drug is a miracle and it was still very important for him to work on healthful lifestyle changes, including a healthy diet, medical care for his needs and medical insurance to cover that care.     He feels strongly that a GLP-1 may help him and the potential benefits of trying one are very real, too, and we acknowledge that. The study does not prohibit their use to treat diabetes.     In the end he said he would consider all of these things but is leaning towards proceeding with a gradual trial of a GLP-1 because of how much he struggles with evening snacking, despite the best of his intentions not to. This is entirely up to you and him at this point, we are not unsupportive only very cautionary. We are scheduled to se him again on May 8, 2025 and will continue to monitor and help however we can while he is on study. That will be the date of his final injection under the study protocol but we follow him for a little while afterwards. Hopefully it may be possible to help him into some kind of expanded access program to still receive Plozasiran afterward, if that's possible. There are no guarantees on that, unfortunately.     Thanks again, Dr. St.     Tressa Espinoza

## 2025-02-18 ENCOUNTER — TELEPHONE (OUTPATIENT)
Dept: FAMILY MEDICINE | Facility: CLINIC | Age: 40
End: 2025-02-18

## 2025-02-18 NOTE — TELEPHONE ENCOUNTER
Prior Authorization Retail Medication Request    Medication/Dose: Ozempic  Diagnosis and ICD code (if different than what is on RX):    Type 2 diabetes mellitus without complication, without long-term current use of insulin (H) [E11.9]        New/renewal/insurance change PA/secondary ins. PA:  Previously Tried and Failed:    Rationale:      Insurance   Covermymeds:  Key: [id number]  Last Name: Mariano  : 1985    Pharmacy Information (if different than what is on RX)  Name:  [facility]  Phone:  [phone]   Fax:[phone]     Clinic Information  Preferred routing pool for dept communication:

## 2025-02-21 NOTE — TELEPHONE ENCOUNTER
PRIOR AUTHORIZATION DENIED    Medication: OZEMPIC (0.25 OR 0.5 MG/DOSE) 2 MG/1.5ML SC SOPN  Insurance Company: [employer] Non-Specialty PA's - Phone [phone] Fax [phone]  Denial Date:  2/21/2025  Denial Reason(s): DENIED - FORMULARY DRUG NEED TO BE TRIED AND FAILED  PLAN PREFERS TRULICITY    Appeal Information: SEE ATTACHED    Patient Notified: NO

## 2025-02-21 NOTE — TELEPHONE ENCOUNTER
PA Initiation    Medication: OZEMPIC (0.25 OR 0.5 MG/DOSE) 2 MG/1.5ML SC SOPN  Insurance Company: [employer] Non-Specialty PA's - Phone [phone] Fax [phone]  Pharmacy Filling the Rx: [facility] - [place], [place] - [address] [address]  Filling Pharmacy Phone: [phone]  Filling Pharmacy Fax: [phone]  Start Date: 2/21/2025

## 2025-02-24 ENCOUNTER — E-VISIT (OUTPATIENT)
Dept: FAMILY MEDICINE | Facility: CLINIC | Age: 40
End: 2025-02-24

## 2025-02-24 ENCOUNTER — MYC MEDICAL ADVICE (OUTPATIENT)
Dept: FAMILY MEDICINE | Facility: CLINIC | Age: 40
End: 2025-02-24

## 2025-02-24 DIAGNOSIS — J06.9 UPPER RESPIRATORY TRACT INFECTION, UNSPECIFIED TYPE: Primary | ICD-10-CM

## 2025-02-24 PROCEDURE — 99207 PR NON-BILLABLE SERV PER CHARTING: CPT | Performed by: FAMILY MEDICINE

## 2025-02-24 NOTE — TELEPHONE ENCOUNTER
Please let LATOSHA know that his insurance has denied this, unfortunately. He needs to try ( and fail) a different injectable before they will pay for ozempic.   We can discuss this at his next appointment   ZACKERY St MD

## 2025-02-25 NOTE — PATIENT INSTRUCTIONS
Dear Booker Quintana,    We are sorry you are not feeling well. Based on the responses you provided, it is recommended that you be seen in-person in urgent care so we can better evaluate your symptoms. Please click here to find the nearest urgent care location to you.   You will not be charged for this Visit. Thank you for trusting us with your care.    Yoly St MD

## 2025-03-20 ENCOUNTER — OFFICE VISIT (OUTPATIENT)
Dept: FAMILY MEDICINE | Facility: CLINIC | Age: 40
End: 2025-03-20
Payer: COMMERCIAL

## 2025-03-20 ENCOUNTER — MYC MEDICAL ADVICE (OUTPATIENT)
Dept: FAMILY MEDICINE | Facility: CLINIC | Age: 40
End: 2025-03-20

## 2025-03-20 VITALS
BODY MASS INDEX: 37.87 KG/M2 | RESPIRATION RATE: 22 BRPM | SYSTOLIC BLOOD PRESSURE: 124 MMHG | DIASTOLIC BLOOD PRESSURE: 84 MMHG | OXYGEN SATURATION: 95 % | HEIGHT: 68 IN | TEMPERATURE: 97.3 F | WEIGHT: 249.9 LBS | HEART RATE: 89 BPM

## 2025-03-20 DIAGNOSIS — E78.1 HYPERTRIGLYCERIDEMIA: ICD-10-CM

## 2025-03-20 DIAGNOSIS — E66.01 MORBID OBESITY (H): ICD-10-CM

## 2025-03-20 DIAGNOSIS — Z87.19 HISTORY OF ACUTE PANCREATITIS: ICD-10-CM

## 2025-03-20 DIAGNOSIS — E11.69 TYPE 2 DIABETES MELLITUS WITH OTHER SPECIFIED COMPLICATION, WITHOUT LONG-TERM CURRENT USE OF INSULIN (H): Primary | ICD-10-CM

## 2025-03-20 DIAGNOSIS — E11.69 TYPE 2 DIABETES MELLITUS WITH OTHER SPECIFIED COMPLICATION, WITHOUT LONG-TERM CURRENT USE OF INSULIN (H): ICD-10-CM

## 2025-03-20 LAB
ANION GAP SERPL CALCULATED.3IONS-SCNC: 16 MMOL/L (ref 7–15)
BUN SERPL-MCNC: 12.3 MG/DL (ref 6–20)
CALCIUM SERPL-MCNC: 9.7 MG/DL (ref 8.8–10.4)
CHLORIDE SERPL-SCNC: 102 MMOL/L (ref 98–107)
CREAT SERPL-MCNC: 0.79 MG/DL (ref 0.67–1.17)
CREAT UR-MCNC: 164.5 MG/DL
EGFRCR SERPLBLD CKD-EPI 2021: >90 ML/MIN/1.73M2
GLUCOSE SERPL-MCNC: 126 MG/DL (ref 70–99)
HCO3 SERPL-SCNC: 23 MMOL/L (ref 22–29)
MICROALBUMIN UR-MCNC: 110.9 MG/L
MICROALBUMIN/CREAT UR: 67.42 MG/G CR (ref 0–17)
POTASSIUM SERPL-SCNC: 4 MMOL/L (ref 3.4–5.3)
SODIUM SERPL-SCNC: 141 MMOL/L (ref 135–145)

## 2025-03-20 RX ORDER — OMEGA-3-ACID ETHYL ESTERS 1 G/1
2 CAPSULE, LIQUID FILLED ORAL DAILY
COMMUNITY
Start: 2025-03-20

## 2025-03-20 RX ORDER — METFORMIN HYDROCHLORIDE 500 MG/1
500 TABLET, EXTENDED RELEASE ORAL
Qty: 90 TABLET | Refills: 3 | Status: SHIPPED | OUTPATIENT
Start: 2025-03-20

## 2025-03-20 NOTE — TELEPHONE ENCOUNTER
Can you call pharmacy and see which of the GLP1s are covered for him?  Usually they will cover one in the class.   ZACKERY St MD

## 2025-03-20 NOTE — TELEPHONE ENCOUNTER
Appears medication is not covered under patient's insurance plan    Yo Orourke, Clinic RN  .[facility]

## 2025-03-20 NOTE — PROGRESS NOTES
Assessment & Plan     (E11.69) Type 2 diabetes mellitus with other specified complication, without long-term current use of insulin (H)  (primary encounter diagnosis)  Comment: ozempic was denied by insurance. It looks like zepbound might be covered - Discussed risks/benefits/side effects with the patient. Will taper up on that. Continue metformin and glipizide. Due for eye exam. See me in 3 months. Send me mychart for taper up prescription in 3 weeks. The patient indicates understanding of these issues and agrees with the plan.   Plan: Adult Eye  Referral, Albumin Random         Urine Quantitative with Creat Ratio, Basic         metabolic panel  (Ca, Cl, CO2, Creat, Gluc, K,         Na, BUN), tirzepatide-weight management         (ZEPBOUND) 2.5 MG/0.5ML vial, metFORMIN         (GLUCOPHAGE XR) 500 MG 24 hr tablet            (Z87.19) History of acute pancreatitis  Comment: no recurrence. He understands risk with glp1 type meds and increased risk of pancreatitis.     (E78.1) Hypertriglyceridemia  Comment: in a clinical trial to lower triglycerides at the [facility] - on omega 3 plus fenofibrates  Plan: omega-3 acid ethyl esters (LOVAZA) 1 g capsule            (E66.01) Obesity (BMI 35.0-39.9) with comorbidity (H)  Comment: discussed and prescription sent in. Discussed risks/benefits/side effects with the patient.   Plan: tirzepatide-weight management (ZEPBOUND) 2.5         MG/0.5ML vial              Liam REINA is a 39 year old, presenting for the following health issues:  Diabetes        3/20/2025     8:52 AM   Additional Questions   Roomed by ANABELLA Read   Accompanied by Self      History of Present Illness       Diabetes:   He presents for follow up of diabetes.    He is not checking blood glucose.         He has no concerns regarding his diabetes at this time.   He is not experiencing numbness or burning in feet, excessive thirst, blurry vision, weight changes or redness, sores or blisters on feet.  "The patient has not had a diabetic eye exam in the last 12 months.           1/28/25 - 7.6 A1c - done with clinical trial team       He was going to start on ozempic but it wasn't covered by insurance  He is currently taking glipizide 10 bid and metformin 500 qday     After discussion with clinical trial team, we decided to trial glp1 medications. He understands the risks, kaylee with his personal history of pancreatitis       Review of Systems  Constitutional, HEENT, cardiovascular, pulmonary, gi and gu systems are negative, except as otherwise noted.      Objective    /84   Pulse 89   Temp 97.3  F (36.3  C) (Tympanic)   Resp 22   Ht 1.735 m (5' 8.31\")   Wt 113.4 kg (249 lb 14.4 oz)   SpO2 95%   BMI 37.66 kg/m    Body mass index is 37.66 kg/m .  Physical Exam   Pt is alert and oriented in no acute distress.  Affect is appropriate. Good eye contact.        Signed Electronically by: Yoly St MD    "

## 2025-03-21 ENCOUNTER — TELEPHONE (OUTPATIENT)
Dept: FAMILY MEDICINE | Facility: CLINIC | Age: 40
End: 2025-03-21
Payer: COMMERCIAL

## 2025-03-21 DIAGNOSIS — E11.69 TYPE 2 DIABETES MELLITUS WITH OTHER SPECIFIED COMPLICATION, WITHOUT LONG-TERM CURRENT USE OF INSULIN (H): ICD-10-CM

## 2025-03-21 DIAGNOSIS — E66.01 MORBID OBESITY (H): ICD-10-CM

## 2025-03-21 NOTE — TELEPHONE ENCOUNTER
Prior Authorization Retail Medication Request    Medication/Dose: Mounjaro 2.5mg  Diagnosis and ICD code (if different than what is on RX):  suzan  New/renewal/insurance change PA/secondary ins. PA:  Previously Tried and Failed:  na  Rationale:  na    Insurance   Primary: [employer]  Insurance ID:  [id number]    Secondary (if applicable):suzan  Insurance ID:  suzan    Pharmacy Information (if different than what is on RX)  Name:  [facility]  Phone:  [phone]  Fax:[phone]    Clinic Information  Preferred routing pool for dept communication: suzan

## 2025-03-24 NOTE — TELEPHONE ENCOUNTER
Central Prior Authorization Team   Phone: [phone]    PA Initiation    Medication: Mounjaro 2.5mg  Insurance Company: [employer] - Phone [phone] Fax [phone]  Pharmacy Filling the Rx: [place], [place] - [address]  Filling Pharmacy Phone: [phone]  Filling Pharmacy Fax:    Start Date: 3/24/2025    [facility] KEY: [id number]

## 2025-03-26 NOTE — TELEPHONE ENCOUNTER
PRIOR AUTHORIZATION DENIED    Medication: Mounjaro 2.5mg    Denial Date: 3/25/2025    Denial Rational:  Patient must have a history of trial & failure to the formulary alternative(s) or have a contraindication or intolerance to the formulary alternatives:                  Appeal Information:    If you would like to appeal, please supply P/A team with a letter of medical necessity with clinical reason.

## 2025-04-19 ENCOUNTER — HEALTH MAINTENANCE LETTER (OUTPATIENT)
Age: 40
End: 2025-04-19

## 2025-05-06 DIAGNOSIS — Z00.6 EXAMINATION OF PARTICIPANT OR CONTROL IN CLINICAL RESEARCH: Primary | ICD-10-CM

## 2025-05-09 ENCOUNTER — MYC MEDICAL ADVICE (OUTPATIENT)
Dept: FAMILY MEDICINE | Facility: CLINIC | Age: 40
End: 2025-05-09
Payer: COMMERCIAL

## 2025-05-09 DIAGNOSIS — E66.01 MORBID OBESITY (H): ICD-10-CM

## 2025-05-09 DIAGNOSIS — E11.9 TYPE 2 DIABETES MELLITUS WITHOUT COMPLICATION, WITHOUT LONG-TERM CURRENT USE OF INSULIN (H): Primary | ICD-10-CM

## 2025-08-02 ENCOUNTER — HEALTH MAINTENANCE LETTER (OUTPATIENT)
Age: 40
End: 2025-08-02

## 2025-08-13 ENCOUNTER — TELEPHONE (OUTPATIENT)
Dept: CARDIOLOGY | Facility: CLINIC | Age: 40
End: 2025-08-13

## (undated) RX ORDER — LIDOCAINE HYDROCHLORIDE 10 MG/ML
INJECTION, SOLUTION EPIDURAL; INFILTRATION; INTRACAUDAL; PERINEURAL
Status: DISPENSED
Start: 2019-04-29